# Patient Record
Sex: FEMALE | Race: WHITE | NOT HISPANIC OR LATINO | Employment: FULL TIME | URBAN - METROPOLITAN AREA
[De-identification: names, ages, dates, MRNs, and addresses within clinical notes are randomized per-mention and may not be internally consistent; named-entity substitution may affect disease eponyms.]

---

## 2021-04-29 ENCOUNTER — PATIENT MESSAGE (OUTPATIENT)
Dept: RESEARCH | Facility: HOSPITAL | Age: 26
End: 2021-04-29

## 2021-11-30 ENCOUNTER — OFFICE VISIT (OUTPATIENT)
Dept: NEUROLOGY | Facility: CLINIC | Age: 26
End: 2021-11-30
Payer: COMMERCIAL

## 2021-11-30 VITALS
DIASTOLIC BLOOD PRESSURE: 73 MMHG | HEIGHT: 60 IN | WEIGHT: 111.44 LBS | BODY MASS INDEX: 21.88 KG/M2 | TEMPERATURE: 98 F | HEART RATE: 84 BPM | SYSTOLIC BLOOD PRESSURE: 113 MMHG | RESPIRATION RATE: 17 BRPM

## 2021-11-30 DIAGNOSIS — G44.40 MEDICATION OVERUSE HEADACHE: ICD-10-CM

## 2021-11-30 DIAGNOSIS — F31.9 BIPOLAR 1 DISORDER: ICD-10-CM

## 2021-11-30 DIAGNOSIS — G43.719 INTRACTABLE CHRONIC MIGRAINE WITHOUT AURA AND WITHOUT STATUS MIGRAINOSUS: Primary | ICD-10-CM

## 2021-11-30 DIAGNOSIS — G47.00 INSOMNIA, UNSPECIFIED TYPE: ICD-10-CM

## 2021-11-30 DIAGNOSIS — F41.9 ANXIETY: ICD-10-CM

## 2021-11-30 DIAGNOSIS — F32.A DEPRESSION, UNSPECIFIED DEPRESSION TYPE: ICD-10-CM

## 2021-11-30 DIAGNOSIS — F90.9 ATTENTION DEFICIT HYPERACTIVITY DISORDER (ADHD), UNSPECIFIED ADHD TYPE: ICD-10-CM

## 2021-11-30 PROCEDURE — 99205 OFFICE O/P NEW HI 60 MIN: CPT | Mod: S$GLB,,, | Performed by: PSYCHIATRY & NEUROLOGY

## 2021-11-30 PROCEDURE — 99999 PR PBB SHADOW E&M-EST. PATIENT-LVL IV: CPT | Mod: PBBFAC,,, | Performed by: PSYCHIATRY & NEUROLOGY

## 2021-11-30 PROCEDURE — 99999 PR PBB SHADOW E&M-EST. PATIENT-LVL IV: ICD-10-PCS | Mod: PBBFAC,,, | Performed by: PSYCHIATRY & NEUROLOGY

## 2021-11-30 PROCEDURE — 99205 PR OFFICE/OUTPT VISIT, NEW, LEVL V, 60-74 MIN: ICD-10-PCS | Mod: S$GLB,,, | Performed by: PSYCHIATRY & NEUROLOGY

## 2021-11-30 RX ORDER — ONDANSETRON 4 MG/1
4 TABLET, FILM COATED ORAL EVERY 8 HOURS PRN
Qty: 15 TABLET | Refills: 3 | Status: SHIPPED | OUTPATIENT
Start: 2021-11-30 | End: 2021-11-30 | Stop reason: SDUPTHER

## 2021-11-30 RX ORDER — SUMATRIPTAN 20 MG/1
1 SPRAY NASAL
Qty: 6 EACH | Refills: 3 | Status: SHIPPED | OUTPATIENT
Start: 2021-11-30 | End: 2021-11-30 | Stop reason: SDUPTHER

## 2021-11-30 RX ORDER — SUMATRIPTAN 20 MG/1
1 SPRAY NASAL
Qty: 6 EACH | Refills: 3 | Status: SHIPPED | OUTPATIENT
Start: 2021-11-30 | End: 2023-01-17

## 2021-11-30 RX ORDER — CEFUROXIME AXETIL 250 MG/1
6 TABLET ORAL
Qty: 3 ML | Refills: 3 | Status: SHIPPED | OUTPATIENT
Start: 2021-11-30 | End: 2021-11-30 | Stop reason: SDUPTHER

## 2021-11-30 RX ORDER — CEFUROXIME AXETIL 250 MG/1
6 TABLET ORAL
Qty: 3 ML | Refills: 3 | Status: SHIPPED | OUTPATIENT
Start: 2021-11-30 | End: 2022-02-10

## 2021-11-30 RX ORDER — ONDANSETRON 4 MG/1
4 TABLET, FILM COATED ORAL EVERY 8 HOURS PRN
Qty: 15 TABLET | Refills: 3 | Status: SHIPPED | OUTPATIENT
Start: 2021-11-30 | End: 2022-02-10 | Stop reason: SDUPTHER

## 2021-12-27 ENCOUNTER — PROCEDURE VISIT (OUTPATIENT)
Dept: NEUROLOGY | Facility: CLINIC | Age: 26
End: 2021-12-27
Payer: COMMERCIAL

## 2021-12-27 VITALS
SYSTOLIC BLOOD PRESSURE: 116 MMHG | RESPIRATION RATE: 18 BRPM | DIASTOLIC BLOOD PRESSURE: 74 MMHG | TEMPERATURE: 98 F | WEIGHT: 112.44 LBS | HEIGHT: 60 IN | BODY MASS INDEX: 22.07 KG/M2 | HEART RATE: 93 BPM

## 2021-12-27 DIAGNOSIS — G43.719 INTRACTABLE CHRONIC MIGRAINE WITHOUT AURA AND WITHOUT STATUS MIGRAINOSUS: Primary | ICD-10-CM

## 2021-12-27 PROCEDURE — 64615 CHEMODENERV MUSC MIGRAINE: CPT | Mod: S$GLB,,, | Performed by: PSYCHIATRY & NEUROLOGY

## 2021-12-27 PROCEDURE — 64615 PR CHEMODENERVATION OF MUSCLE FOR CHRONIC MIGRAINE: ICD-10-PCS | Mod: S$GLB,,, | Performed by: PSYCHIATRY & NEUROLOGY

## 2022-02-10 ENCOUNTER — OFFICE VISIT (OUTPATIENT)
Dept: NEUROLOGY | Facility: CLINIC | Age: 27
End: 2022-02-10
Payer: COMMERCIAL

## 2022-02-10 DIAGNOSIS — G43.719 INTRACTABLE CHRONIC MIGRAINE WITHOUT AURA AND WITHOUT STATUS MIGRAINOSUS: Primary | ICD-10-CM

## 2022-02-10 PROCEDURE — 1159F PR MEDICATION LIST DOCUMENTED IN MEDICAL RECORD: ICD-10-PCS | Mod: CPTII,95,, | Performed by: PSYCHIATRY & NEUROLOGY

## 2022-02-10 PROCEDURE — 1159F MED LIST DOCD IN RCRD: CPT | Mod: CPTII,95,, | Performed by: PSYCHIATRY & NEUROLOGY

## 2022-02-10 PROCEDURE — 1160F RVW MEDS BY RX/DR IN RCRD: CPT | Mod: CPTII,95,, | Performed by: PSYCHIATRY & NEUROLOGY

## 2022-02-10 PROCEDURE — 99214 OFFICE O/P EST MOD 30 MIN: CPT | Mod: 95,,, | Performed by: PSYCHIATRY & NEUROLOGY

## 2022-02-10 PROCEDURE — 1160F PR REVIEW ALL MEDS BY PRESCRIBER/CLIN PHARMACIST DOCUMENTED: ICD-10-PCS | Mod: CPTII,95,, | Performed by: PSYCHIATRY & NEUROLOGY

## 2022-02-10 PROCEDURE — 99214 PR OFFICE/OUTPT VISIT, EST, LEVL IV, 30-39 MIN: ICD-10-PCS | Mod: 95,,, | Performed by: PSYCHIATRY & NEUROLOGY

## 2022-02-10 RX ORDER — RIMEGEPANT SULFATE 75 MG/75MG
75 TABLET, ORALLY DISINTEGRATING ORAL ONCE AS NEEDED
Qty: 16 TABLET | Refills: 6 | Status: SHIPPED | OUTPATIENT
Start: 2022-02-10 | End: 2022-02-15

## 2022-02-10 RX ORDER — ELETRIPTAN HYDROBROMIDE 40 MG/1
40 TABLET, FILM COATED ORAL
Qty: 9 TABLET | Refills: 6 | Status: SHIPPED | OUTPATIENT
Start: 2022-02-10 | End: 2023-01-17

## 2022-02-10 RX ORDER — ONDANSETRON 4 MG/1
4 TABLET, FILM COATED ORAL EVERY 8 HOURS PRN
Qty: 15 TABLET | Refills: 6 | Status: SHIPPED | OUTPATIENT
Start: 2022-02-10 | End: 2023-07-20

## 2022-02-10 RX ORDER — SUMATRIPTAN SUCCINATE 4 MG/.5ML
4 INJECTION, SOLUTION SUBCUTANEOUS
Qty: 3 ML | Refills: 6 | Status: SHIPPED | OUTPATIENT
Start: 2022-02-10 | End: 2023-01-17

## 2022-02-10 NOTE — PROGRESS NOTES
[facility]    The patient location is: [place]  The chief complaint leading to consultation is: chronic migraine    Visit type: tele audiovisual  Face to Face time with patient: 13 minutes  30 minutes of total time spent on the encounter, which includes face to face time and non-face to face time preparing to see the patient (eg, review of tests), Obtaining and/or reviewing separately obtained history, Documenting clinical information in the electronic or other health record, Independently interpreting results (not separately reported) and communicating results to the patient/family/caregiver, or Care coordination (not separately reported).         Each patient to whom he or she provides medical services by telemedicine is:  (1) informed of the relationship between the physician and patient and the respective role of any other health care provider with respect to management of the patient; and (2) notified that he or she may decline to receive medical services by telemedicine and may withdraw from such care at any time.    Notes:     Chief complaint: chronic migraine     S:    26 Y ambidextrous F with PMHx of ADHD, iron deficiency anemia, bipolar 1 disorder, OCD, anxiety, depression, insomnia who presents for further  evaluation of headache. She is being seen through telemedicine. She was initially seen on 11/30/21at which time she was having 20 days a month of migraine and had not improved with oral preventives. She was having significant severe attacks she would wake up with them and lasting up to 4 days per attack. She was started on Botox for chronci migraine and prescribed sumatriptan injectable and NS.        Today she reports since Botox 155 units on 12/21 she has noticed improvement since her first cycle of Botox. She has only had about 5-6 migraine attacks since Botox, they are  lasting 2 days now. She feels that attacks are much less frequent and also not as long down to 2  "days. Overall she feels that things have improved significantly. She feels that sumatriptan 6mg sc is working well. It gives quick relief, but causes significant sudden fatigue "knocks me down", but  it's not fully resolving the attack . She will have attack for 2 days, but much less intense. She mentions that she would like a lower dose of the injectable . She does not like the NS and would like to still keep it in case of needing it, but not her preferred and would not like refills on it. She is open to an oral triptan or other options. She mentions that at times she goes to bed with a headache and then will wake up in the morning with severe attack. She tried doing injectable at that time and felt it was like wasting an injection because she would wake up with migraine still. She mentions that she woke up yesterday with nausea/vomiting and had to use injection. It limits what she can do after this.     Headache history from initial evaluation on 11/30/21:  Age at onset and course over time: she had them since elementary school, she reports that she has been having over the years more days of headache since childhood. She mentions that since getting dark glasses in 2017 they improved some. She mentions that the only time she did not have migraine was during the pregnancy. She mentions that she even had Daith Piercing which did not work. She mentions that she could before push through day #4 of migraine, but she is mentions that she is having more disabling attacks.    Location: entire head   Character: stabbing, pressure, throbbing/pounding, sharp   Intensity:  3-10/10  Has about 10 days of no headache   Frequency: 20/30 days   She gets a sick feeling like she knows an attack will hit her. She mentions that if she does not wake up with one in the morning, by 2 pm it just hit her and just starts at once and all the symptoms start at once. She mentions that when she leaves work and has things to do and she will have " worsening when she is doing things. She mentions that she has not been to work in over 2 weeks now . She mentions that she would leave at lunch time to go home and has not been back.   Duration: minimum 1 week of migraine , when it's severe she feels unsafe.   She is waking up with them, but if does not wake up with them by 2 pm she will have sudden onset of the attack.   She will use APAP/NSAID and will use   Aura: none   Associated symptoms: photophobia, phonophobia, osmophobia, kinesiophobia, neck tightness/pain, nausea/vomiting  Other neurologic symptoms: dizziness, vertigo, ringing in the ears, mood changes, problems with concentration, memory, task completion, relaxation, neck tightness, neck pain.   Precipitating factors: light, noise  Alleviating factors: darkness, heat, ice, massage, local pressure  Aggravating factors: bright lights, loud noises  High pitched steady ringing in right ear  Family history of headache: paternal cousins, paternal grandmother has headache all the time  ER/UC visits:   Caffeine: 4 cups of coffee  Sleep: trouble falling asleep, trouble staying asleep, un refreshing sleep     Medication history:  Acute:  apap  nsaid- ibuprofen, asa, naproxen  fioricet  excedrin  Hydrocodone   Sumatriptan 6mg sc - works well at pain relief  Sumatriptan 20mg sc - does not like this    Preventive:  Lamotrigine 200 mg for mood stabilizer.   seroquel 300 mg nightly   pregabalin  topiramate   Desipramine   Fluoxetine   Bupropion   Citalopram  escitalopram  Gabapentin   Diazepam  Alprazolam   Lorazepam   clonazepam  Botox 155 units started on 12/21- present: over 50% improvement     Has never had CGRP Mab   Has never been on triptans, ergotamines, gepants         Neuroimaging and other studies:   She has had MRIs int he past and has been told normal.   She has metal in her tongue that is stuck and is trying to find someone to surgically remove it.     ROS: The fourteen point review of system was  performed.   Constitutional:  Denies fevers/cold or heat intolerance, weight loss/gain or fatigue.  Eyes:                        Denies diplopia, ptosis, visual field defects or ocular disease   excepting any listed above.  ENT:   Denies hearing loss, infection, carcinoma or other diseases excepting any listed above.   Cardiovascular:  Denies stroke, CAD, arrhythmia, CHF or other disease excepting any listed above.  Pulmonary:  Denies dyspnea, COPD, RAD or infection or neoplasm excepting any listed above.  Gastrointestinal: Denies ulcer disease, liver or other disease excepting any listed above.  Skin:   Denies rash, skin cancer, or other cutaneous disorder excepting any listed above.  Neurological:  See HPI  Musculoskeletal: Rt hand pain, weakness   Heme/Lymphatic: Denies any blood or lymph system neoplasm or disorder excepting any listed above.  Endocrine:  Denies any thyroid or other disorders, excepting any listed above.  Allergic/Immuno: Denies any autoimmune disease or allergy excepting any listed above.  Psychiatric:  Mood improved some   Urologic:  Denies any difficulties with the urinary system or sexual function except noted above.    No changes to past medical history, surgical or family history since last appointment.   Social history:  Occupation: construction, manual labor, operating equipment.   Currently on CDL      7.5y son  No plans of further pregnancies.   Social History     Tobacco Use    Smoking status: Current Every Day Smoker     Packs/day: 0.25    Smokeless tobacco: Never Used   Substance Use Topics    Alcohol use: Yes     Comment: 1-2 drinks lper week    Drug use: Not Currently     Types: Amphetamines, Barbituates, Benzodiazepines, Cocaine, Heroin, Marijuana     Comment: narcotics, ecstacy (all prior use)     Review of patient's allergies indicates:   Allergen Reactions    Bactrim [sulfamethoxazole-trimethoprim]     Sulfa (sulfonamide antibiotics)        Current Outpatient  Medications:     dextroamphetamine-amphetamine (ADDERALL) 30 mg Tab, Take 1 tablet (30 mg total) by mouth 2 (two) times daily., Disp: 60 tablet, Rfl: 0    lamoTRIgine (LAMICTAL) 200 MG tablet, 200 mg nightly. , Disp: , Rfl:     norgestimate-ethinyl estradioL (ORTHO-CYCLEN) 0.25-35 mg-mcg per tablet, Take 1 tablet by mouth once daily., Disp: 30 tablet, Rfl: 11    ondansetron (ZOFRAN) 4 MG tablet, Take 1 tablet (4 mg total) by mouth every 8 (eight) hours as needed for Nausea., Disp: 15 tablet, Rfl: 3    QUEtiapine (SEROQUEL) 300 MG Tab, MAY CAUSE DROWSINESS TAKE 1 TABLET BY MOUTH EVERY NIGHT, Disp: , Rfl:     sumatriptan (IMITREX STATDOSE) 6 mg/0.5 mL kit, Inject 0.5 mLs (6 mg total) into the skin as needed for Migraine (can repeat after 2 hours. max 2/day.)., Disp: 3 mL, Rfl: 3    SUMAtriptan (IMITREX) 20 mg/actuation nasal spray, 1 spray (20 mg total) by Nasal route as needed for Migraine (can repeat after 2 hours. max 2/day.)., Disp: 6 each, Rfl: 3    PHYSICAL EXAMINATION  There were no vitals filed for this visit.   General: The patient is a well-developed, well-nourished looking of stated age in no acute distress.  NEUROLOGIC EXAM:  MENTAL: The patient is awake, alert and oriented times to time, place, location and situation. Intact recent memory, attention, concentration. Language and speech are normal. No aphasia, no dysarthria  CRANIAL NERVES:   EOMI, no facial asymmetry noted.   MOTOR EXAM she is currently moving and bending down during visit, she has full UE/LE strength from her activities.   CEREBELLAR EXAM: no UE dysmetria   GAIT/STANCE: Standard gait was normal with normal stride, arm swing and turning.  No gait     Impression:  Chronic migraine without aura - she has had lifelong migraine disease which has been chronic for many years without much improvement on oral preventives. She has been overusing OTC medications to get through the day and manage. She has been on OCPs for 7.5 years now without  any worsening headache. Initially she was having 20/30 days of severe migraine with significant disability as per MIDAS score and history. Since last appointment she started on Botox for chornic migraine and has had in the last 6 weeks 5-6 migraine attacks, they are lasting less to 2 days and feels sumatriptan is working, but does not like the sumatriptan effect. We will transition her to 4mg sc. Also will provide Eletriptan 40mg for acute management of milder attacks and Nurtec to see if adding that will also provide benefit without return of migraine.     Medication overuse hadache - resolved.     Comorbidities:  ADHD -> Adderall    iron deficiency anemia    bipolar 1 disorder - sees psychiatry currently on Lamotrigine 200mg and Seroquel 300mg    OCD, anxiety, depression, insomnia -> behavioral health     Plan:   1- For preventive management: a. Continue Botox q 12weeks  Muscles to be injected:  total of 155 units of botulinum toxin type A were  injected in the following muscles: Procerus 5 units,  5 units bilaterally, frontalis 20 units, temporalis 20 units bilaterally, occipitalis 15 units, upper cervical paraspinals 10 units bilaterally and trapezius 15 units bilaterally. Total of 155 units in 31 sites.    2- Acute management:   For mild attacks eletriptan 40 mg at onset, can repeat after 2 hours. Max 2/day.   For sudden onset migraine, severe attack, nausea/vomiting: Sumatriptan 4 mg sc, can repeat after 2 hours. Max 3/day.     Do not take sumatriptan injectable and eletriptan the same day.      With or without Nurtec ODT 75 mg . Max 1/day. Do not take with grapefruit.     Do not take acute medications over 10 days a month or so.     For nausea: Zofran ODT 4 mg every 8 hours as needed     RTC in 6 weeks for Botox.         Marley Bernstein MD   Board Certified Neurologist  [facility] Certified Headache Medicine

## 2022-02-14 ENCOUNTER — TELEPHONE (OUTPATIENT)
Dept: PHARMACY | Facility: CLINIC | Age: 27
End: 2022-02-14
Payer: COMMERCIAL

## 2022-03-28 ENCOUNTER — PROCEDURE VISIT (OUTPATIENT)
Dept: NEUROLOGY | Facility: CLINIC | Age: 27
End: 2022-03-28
Payer: COMMERCIAL

## 2022-03-28 VITALS
RESPIRATION RATE: 17 BRPM | SYSTOLIC BLOOD PRESSURE: 109 MMHG | TEMPERATURE: 98 F | DIASTOLIC BLOOD PRESSURE: 70 MMHG | HEART RATE: 93 BPM | WEIGHT: 111 LBS | HEIGHT: 60 IN | BODY MASS INDEX: 21.79 KG/M2

## 2022-03-28 DIAGNOSIS — G43.719 INTRACTABLE CHRONIC MIGRAINE WITHOUT AURA AND WITHOUT STATUS MIGRAINOSUS: Primary | ICD-10-CM

## 2022-03-28 PROCEDURE — 64615 CHEMODENERV MUSC MIGRAINE: CPT | Mod: S$GLB,,, | Performed by: PSYCHIATRY & NEUROLOGY

## 2022-03-28 PROCEDURE — 64615 PR CHEMODENERVATION OF MUSCLE FOR CHRONIC MIGRAINE: ICD-10-PCS | Mod: S$GLB,,, | Performed by: PSYCHIATRY & NEUROLOGY

## 2022-03-28 NOTE — PROCEDURES
Procedures     BOTOX PROCEDURE    PROCEDURE PERFORMED: Botulinum toxin injection (98318)    CLINICAL INDICATION: G43.719    A time out was conducted just before the start of the procedure to verify the correct patient and procedure, procedure location, and all relevant critical information.   Signed consent obtained prior to procedure     Conventional methods of treatment but the patient has been unresponsive and refractory.The patient meets criteria for chronic headaches according to the ICHD-III, the patient has more than 15 headaches a month at least 8 of them meet migraine criteria, which last for more than 4 hours a day.     Last Botox injections were on 12/27/21  and  there has been over 50%  improvement in the patient's symptoms. Frequency of treatment is every 12 weeks unless no response to the treatments, at which time we will discontinue the injections.     DESCRIPTION OF PROCEDURE: After obtaining informed consent and under  aseptic technique, a total of 155 units of botulinum toxin type A were   injected in the following muscles: Procerus 5 units,  5 units  bilaterally, frontalis 20 units, temporalis 20 units bilaterally,  occipitalis 15 units, upper cervical paraspinals 10 units bilaterally and trapezius 15 units bilaterally. The patient was given a total of 155 units in 31 sites.    The patient tolerated the procedure well. There were no complications. The patient was given a prescription for repeat treatment in 12 weeks.     Unavoidable waste 45 units    Marley Bernstein MD   Board Certified Neurologist   [facility] Certified Headache Medicine

## 2022-06-20 ENCOUNTER — PROCEDURE VISIT (OUTPATIENT)
Dept: NEUROLOGY | Facility: CLINIC | Age: 27
End: 2022-06-20
Payer: COMMERCIAL

## 2022-06-20 ENCOUNTER — PATIENT MESSAGE (OUTPATIENT)
Dept: NEUROLOGY | Facility: CLINIC | Age: 27
End: 2022-06-20

## 2022-06-20 VITALS
BODY MASS INDEX: 21.47 KG/M2 | DIASTOLIC BLOOD PRESSURE: 80 MMHG | WEIGHT: 109.38 LBS | HEART RATE: 101 BPM | HEIGHT: 60 IN | SYSTOLIC BLOOD PRESSURE: 122 MMHG | RESPIRATION RATE: 17 BRPM

## 2022-06-20 DIAGNOSIS — G43.719 INTRACTABLE CHRONIC MIGRAINE WITHOUT AURA AND WITHOUT STATUS MIGRAINOSUS: Primary | ICD-10-CM

## 2022-06-20 PROCEDURE — 64615 CHEMODENERV MUSC MIGRAINE: CPT | Mod: S$GLB,,, | Performed by: PSYCHIATRY & NEUROLOGY

## 2022-06-20 PROCEDURE — 64615 PR CHEMODENERVATION OF MUSCLE FOR CHRONIC MIGRAINE: ICD-10-PCS | Mod: S$GLB,,, | Performed by: PSYCHIATRY & NEUROLOGY

## 2022-06-20 RX ORDER — RIMEGEPANT SULFATE 75 MG/75MG
75 TABLET, ORALLY DISINTEGRATING ORAL ONCE AS NEEDED
Qty: 16 TABLET | Refills: 11 | Status: SHIPPED | OUTPATIENT
Start: 2022-06-20 | End: 2022-07-08

## 2022-06-20 NOTE — PROCEDURES
Procedures       BOTOX PROCEDURE    PROCEDURE PERFORMED: Botulinum toxin injection (41599)    CLINICAL INDICATION: G43.719    Cycle #3    A time out was conducted just before the start of the procedure to verify the correct patient and procedure, procedure location, and all relevant critical information.   Signed consent obtained prior to procedure     Conventional methods of treatment but the patient has been unresponsive and refractory.The patient meets criteria for chronic headaches according to the ICHD-III, the patient has more than 15 headaches a month at least 8 of them meet migraine criteria, which last for more than 4 hours a day.     Last Botox injections were on 3/28/22  and  there has been over 50%  improvement in the patient's symptoms. Frequency of treatment is every 12 weeks unless no response to the treatments, at which time we will discontinue the injections.     DESCRIPTION OF PROCEDURE: After obtaining informed consent and under  aseptic technique, a total of 155 units of botulinum toxin type A were   injected in the following muscles: Procerus 5 units,  5 units  bilaterally, frontalis 20 units, temporalis 20 units bilaterally,  occipitalis 15 units, upper cervical paraspinals 10 units bilaterally and trapezius 15 units bilaterally. The patient was given a total of 155 units in 31 sites.    The patient tolerated the procedure well. There were no complications. The patient was given a prescription for repeat treatment in 12 weeks.     Unavoidable waste 45 units    RTC in 6 weeks.     Marley Bernstein MD   Board Certified Neurologist   [facility] Certified Headache Medicine

## 2022-07-26 ENCOUNTER — TELEPHONE (OUTPATIENT)
Dept: PSYCHIATRY | Facility: CLINIC | Age: 27
End: 2022-07-26
Payer: COMMERCIAL

## 2022-08-01 ENCOUNTER — OFFICE VISIT (OUTPATIENT)
Dept: PRIMARY CARE CLINIC | Facility: CLINIC | Age: 27
End: 2022-08-01
Payer: COMMERCIAL

## 2022-08-01 ENCOUNTER — OFFICE VISIT (OUTPATIENT)
Dept: NEUROLOGY | Facility: CLINIC | Age: 27
End: 2022-08-01
Payer: COMMERCIAL

## 2022-08-01 ENCOUNTER — TELEPHONE (OUTPATIENT)
Dept: OBSTETRICS AND GYNECOLOGY | Facility: CLINIC | Age: 27
End: 2022-08-01
Payer: COMMERCIAL

## 2022-08-01 VITALS
TEMPERATURE: 98 F | BODY MASS INDEX: 21.77 KG/M2 | SYSTOLIC BLOOD PRESSURE: 110 MMHG | WEIGHT: 110.88 LBS | HEART RATE: 92 BPM | DIASTOLIC BLOOD PRESSURE: 72 MMHG | HEIGHT: 60 IN

## 2022-08-01 DIAGNOSIS — G43.019 INTRACTABLE MIGRAINE WITHOUT AURA AND WITHOUT STATUS MIGRAINOSUS: ICD-10-CM

## 2022-08-01 DIAGNOSIS — F90.9 ATTENTION DEFICIT HYPERACTIVITY DISORDER (ADHD), UNSPECIFIED ADHD TYPE: ICD-10-CM

## 2022-08-01 DIAGNOSIS — Z86.69 H/O MIGRAINE: ICD-10-CM

## 2022-08-01 DIAGNOSIS — F31.9 BIPOLAR 1 DISORDER: ICD-10-CM

## 2022-08-01 DIAGNOSIS — G43.719 INTRACTABLE CHRONIC MIGRAINE WITHOUT AURA AND WITHOUT STATUS MIGRAINOSUS: Primary | ICD-10-CM

## 2022-08-01 DIAGNOSIS — Z76.89 ENCOUNTER TO ESTABLISH CARE: Primary | ICD-10-CM

## 2022-08-01 DIAGNOSIS — G43.901 STATUS MIGRAINOSUS: ICD-10-CM

## 2022-08-01 PROCEDURE — 1159F MED LIST DOCD IN RCRD: CPT | Mod: CPTII,S$GLB,, | Performed by: FAMILY MEDICINE

## 2022-08-01 PROCEDURE — 99999 PR PBB SHADOW E&M-EST. PATIENT-LVL IV: CPT | Mod: PBBFAC,,, | Performed by: FAMILY MEDICINE

## 2022-08-01 PROCEDURE — 99214 OFFICE O/P EST MOD 30 MIN: CPT | Mod: 95,,, | Performed by: PSYCHIATRY & NEUROLOGY

## 2022-08-01 PROCEDURE — 1160F PR REVIEW ALL MEDS BY PRESCRIBER/CLIN PHARMACIST DOCUMENTED: ICD-10-PCS | Mod: CPTII,S$GLB,, | Performed by: FAMILY MEDICINE

## 2022-08-01 PROCEDURE — 99999 PR PBB SHADOW E&M-EST. PATIENT-LVL IV: ICD-10-PCS | Mod: PBBFAC,,, | Performed by: FAMILY MEDICINE

## 2022-08-01 PROCEDURE — 99215 PR OFFICE/OUTPT VISIT, EST, LEVL V, 40-54 MIN: ICD-10-PCS | Mod: S$GLB,,, | Performed by: FAMILY MEDICINE

## 2022-08-01 PROCEDURE — 99214 PR OFFICE/OUTPT VISIT, EST, LEVL IV, 30-39 MIN: ICD-10-PCS | Mod: 95,,, | Performed by: PSYCHIATRY & NEUROLOGY

## 2022-08-01 PROCEDURE — 1160F RVW MEDS BY RX/DR IN RCRD: CPT | Mod: CPTII,95,, | Performed by: PSYCHIATRY & NEUROLOGY

## 2022-08-01 PROCEDURE — 1159F MED LIST DOCD IN RCRD: CPT | Mod: CPTII,95,, | Performed by: PSYCHIATRY & NEUROLOGY

## 2022-08-01 PROCEDURE — 3074F SYST BP LT 130 MM HG: CPT | Mod: CPTII,S$GLB,, | Performed by: FAMILY MEDICINE

## 2022-08-01 PROCEDURE — 3074F PR MOST RECENT SYSTOLIC BLOOD PRESSURE < 130 MM HG: ICD-10-PCS | Mod: CPTII,S$GLB,, | Performed by: FAMILY MEDICINE

## 2022-08-01 PROCEDURE — 1160F RVW MEDS BY RX/DR IN RCRD: CPT | Mod: CPTII,S$GLB,, | Performed by: FAMILY MEDICINE

## 2022-08-01 PROCEDURE — 3078F PR MOST RECENT DIASTOLIC BLOOD PRESSURE < 80 MM HG: ICD-10-PCS | Mod: CPTII,S$GLB,, | Performed by: FAMILY MEDICINE

## 2022-08-01 PROCEDURE — 1160F PR REVIEW ALL MEDS BY PRESCRIBER/CLIN PHARMACIST DOCUMENTED: ICD-10-PCS | Mod: CPTII,95,, | Performed by: PSYCHIATRY & NEUROLOGY

## 2022-08-01 PROCEDURE — 1159F PR MEDICATION LIST DOCUMENTED IN MEDICAL RECORD: ICD-10-PCS | Mod: CPTII,95,, | Performed by: PSYCHIATRY & NEUROLOGY

## 2022-08-01 PROCEDURE — 3008F PR BODY MASS INDEX (BMI) DOCUMENTED: ICD-10-PCS | Mod: CPTII,S$GLB,, | Performed by: FAMILY MEDICINE

## 2022-08-01 PROCEDURE — 3078F DIAST BP <80 MM HG: CPT | Mod: CPTII,S$GLB,, | Performed by: FAMILY MEDICINE

## 2022-08-01 PROCEDURE — 1159F PR MEDICATION LIST DOCUMENTED IN MEDICAL RECORD: ICD-10-PCS | Mod: CPTII,S$GLB,, | Performed by: FAMILY MEDICINE

## 2022-08-01 PROCEDURE — 3008F BODY MASS INDEX DOCD: CPT | Mod: CPTII,S$GLB,, | Performed by: FAMILY MEDICINE

## 2022-08-01 PROCEDURE — 99215 OFFICE O/P EST HI 40 MIN: CPT | Mod: S$GLB,,, | Performed by: FAMILY MEDICINE

## 2022-08-01 RX ORDER — DEXTROAMPHETAMINE SACCHARATE, AMPHETAMINE ASPARTATE, DEXTROAMPHETAMINE SULFATE AND AMPHETAMINE SULFATE 7.5; 7.5; 7.5; 7.5 MG/1; MG/1; MG/1; MG/1
30 TABLET ORAL 2 TIMES DAILY
Qty: 60 TABLET | Refills: 0 | Status: SHIPPED | OUTPATIENT
Start: 2022-08-01 | End: 2022-08-31

## 2022-08-01 RX ORDER — RIMEGEPANT SULFATE 75 MG/75MG
75 TABLET, ORALLY DISINTEGRATING ORAL ONCE AS NEEDED
COMMUNITY
End: 2022-08-01 | Stop reason: SDUPTHER

## 2022-08-01 RX ORDER — LAMOTRIGINE 200 MG/1
200 TABLET ORAL NIGHTLY
Qty: 90 TABLET | Refills: 3 | Status: SHIPPED | OUTPATIENT
Start: 2022-08-01 | End: 2022-11-02

## 2022-08-01 RX ORDER — DEXTROAMPHETAMINE SACCHARATE, AMPHETAMINE ASPARTATE, DEXTROAMPHETAMINE SULFATE AND AMPHETAMINE SULFATE 7.5; 7.5; 7.5; 7.5 MG/1; MG/1; MG/1; MG/1
30 TABLET ORAL 2 TIMES DAILY
Qty: 60 TABLET | Refills: 0 | Status: SHIPPED | OUTPATIENT
Start: 2022-09-01 | End: 2022-10-01

## 2022-08-01 RX ORDER — RIMEGEPANT SULFATE 75 MG/75MG
75 TABLET, ORALLY DISINTEGRATING ORAL ONCE AS NEEDED
Qty: 16 TABLET | Refills: 6 | Status: SHIPPED | OUTPATIENT
Start: 2022-08-01 | End: 2022-08-18

## 2022-08-01 RX ORDER — QUETIAPINE FUMARATE 300 MG/1
300 TABLET, FILM COATED ORAL NIGHTLY
Qty: 90 TABLET | Refills: 3 | Status: SHIPPED | OUTPATIENT
Start: 2022-08-01 | End: 2023-06-20 | Stop reason: SDUPTHER

## 2022-08-01 RX ORDER — DEXTROAMPHETAMINE SACCHARATE, AMPHETAMINE ASPARTATE, DEXTROAMPHETAMINE SULFATE AND AMPHETAMINE SULFATE 7.5; 7.5; 7.5; 7.5 MG/1; MG/1; MG/1; MG/1
30 TABLET ORAL 2 TIMES DAILY
Qty: 60 TABLET | Refills: 0 | Status: SHIPPED | OUTPATIENT
Start: 2022-10-02 | End: 2022-11-02 | Stop reason: SDUPTHER

## 2022-08-01 RX ORDER — DIHYDROERGOTAMINE MESYLATE 4 MG/ML
SPRAY, METERED NASAL
Qty: 8 ML | Refills: 6 | Status: SHIPPED | OUTPATIENT
Start: 2022-08-01 | End: 2023-01-17

## 2022-08-01 NOTE — PROGRESS NOTES
"Gurinder Perez MD    [facility]      [address] [place] [address]      Phone: [phone]      Fax: [phone]                  Office Visit  08/01/2022        Subjective      HPI:  Iris Johnson is a 27 y.o. female presents today in clinic for "Establish Care and Medication Refill  ."     27-year-old female presents today to establish care, discuss med refills.    Patient states she feels good today.  She had a good weekend.  Her soul feels happy today.    She denies any chest pain, shortness breast.  Denies fever, chills, body aches.  Denies coughing, sneezing, URI type symptoms.  States appetite is okay.  She tries to drink lots of water each day.  States bowel movements have been more regular lately.  She usually struggles with constipation, very irregular.  Has been going to the bathroom more frequently lately.  Uses essential oils to help with this.  Denies any urinary issues.    States she has a history of ADHD.  Currently takes Adderall 30 mg, twice a day.  Has been taking this for years.  Was previous sleep diagnosed with ADHD shortly after the birth of her 1st child.  Current dose works well for her.  Does not affect sleep.  Helps with focus, concentration.  Without it, she feels scattered brain.    Also has history of bipolar 1 disorder.  Has been taking Lamictal, Seroquel for this.  States she has followed with Psychology/Psychiatry in the past.  Previously was seeing Dr. Tiffanie Galvez, but she changed clinics.  Lately, has been going to [facility], but states that they are not refilling her medications anymore.  Needs new referral to get re-established with Dr. Galvez.  States that over the years, they have tried various medications to help her bipolar/OCD, depression.  This combination seems to work the best.    Also has history of chronic migraines.  Follows with Neurology regularly for this.  Has appointment later this morning.    PMH:  ADHD, bipolar, insomnia, depression, " OCD, panic attacks, migraines.  PSH:  Nose.  Tonsils.  Vale teeth.  F MH:  Skin cancer.  Breast cancer.  CAD.  HTN.  Bipolar.  Alcohol abuse.  Allergies:  Sulfa drugs cause hives  Social:  Works in construction, operate heavy machinery.  T:  Denies  A:  Denies  D:  THC-has medical marijuana card.  Smokes.    Exercise:  No regular exercise program.  Lots of physical activity at work.    Gyn:  Dr. Hassan at [facility] (Wants closer gyn. Has appt on Wed in [facility].)    Pap:  11/22/2021.  Repeat 3 years (2024)      The following were updated and reviewed by myself in the chart: medications, past medical history, past surgical history, family history, social history, and allergies.     Medications:  Current Outpatient Medications on File Prior to Visit   Medication Sig Dispense Refill    eletriptan (RELPAX) 40 MG tablet Take 1 tablet (40 mg total) by mouth as needed (migraine). may repeat in 2 hours if necessary. Max is 2/day. 9 tablet 6    ondansetron (ZOFRAN) 4 MG tablet Take 1 tablet (4 mg total) by mouth every 8 (eight) hours as needed for Nausea. 15 tablet 6    rimegepant (NURTEC) 75 mg odt Take 75 mg by mouth once as needed for Migraine. Place ODT tablet on the tongue; alternatively the ODT tablet may be placed under the tongue      [DISCONTINUED] dextroamphetamine-amphetamine (ADDERALL) 30 mg Tab Take 1 tablet (30 mg total) by mouth 2 (two) times daily. 60 tablet 0    [DISCONTINUED] lamoTRIgine (LAMICTAL) 200 MG tablet 200 mg nightly.       [DISCONTINUED] QUEtiapine (SEROQUEL) 300 MG Tab MAY CAUSE DROWSINESS TAKE 1 TABLET BY MOUTH EVERY NIGHT      SUMAtriptan (IMITREX) 20 mg/actuation nasal spray 1 spray (20 mg total) by Nasal route as needed for Migraine (can repeat after 2 hours. max 2/day.). 6 each 3    SUMAtriptan succinate (IMITREX) 4 mg/0.5 mL PnIj Inject 4 mg into the skin as needed (migraine, can repeat after 2 hours. max 3/day.). 3 mL 6    [DISCONTINUED] norgestimate-ethinyl estradioL (ORTHO-CYCLEN)  0.25-35 mg-mcg per tablet Take 1 tablet by mouth once daily. (Patient not taking: Reported on 8/1/2022) 30 tablet 1     No current facility-administered medications on file prior to visit.        PMHx:  Past Medical History:   Diagnosis Date    ADHD (attention deficit hyperactivity disorder)     Anemia     Bipolar disorder     Chicken pox     childhood    Headache     Insomnia     Migraine     Substance abuse     prior use, no current use    Syncope 2009    admitted x 1      Patient Active Problem List    Diagnosis Date Noted    Intractable chronic migraine without aura and without status migrainosus 12/27/2021        PSHx:  Past Surgical History:   Procedure Laterality Date    dental extraction (2013)  2013    RHINOPLASTY  2010        FHx:  Family History   Problem Relation Age of Onset    Skin cancer Mother     Bipolar disorder Mother     Cervical cancer Mother     Hyperlipidemia Father     Hyperlipidemia Paternal Grandmother     Breast cancer Paternal Grandmother     Asthma Paternal Grandmother     Depression Paternal Grandmother     Hyperlipidemia Paternal Grandfather     Heart disease Paternal Grandfather         Social:  Social History     Socioeconomic History    Marital status:     Number of children: 1   Occupational History    Occupation:      Comment: [employer]   Tobacco Use    Smoking status: Current Every Day Smoker     Packs/day: 0.25    Smokeless tobacco: Never Used   Substance and Sexual Activity    Alcohol use: Yes     Comment: 1-2 drinks lper week    Drug use: Not Currently     Types: Amphetamines, Barbituates, Benzodiazepines, Cocaine, Heroin, Marijuana     Comment: narcotics, ecstacy (all prior use)    Sexual activity: Yes     Partners: Male     Comment: birth control pill        Allergies:  Review of patient's allergies indicates:   Allergen Reactions    Bactrim [sulfamethoxazole-trimethoprim]     Sulfa (sulfonamide antibiotics)         ROS:  Review  of Systems   Constitutional: Negative for activity change, appetite change, chills and fever.   HENT: Negative for congestion, postnasal drip, rhinorrhea, sore throat and trouble swallowing.    Respiratory: Negative for cough, shortness of breath and wheezing.    Cardiovascular: Negative for chest pain and palpitations.   Gastrointestinal: Negative for abdominal pain, constipation, diarrhea, nausea and vomiting.   Genitourinary: Negative for difficulty urinating.   Musculoskeletal: Negative for arthralgias and myalgias.   Skin: Negative for color change and rash.   Neurological: Negative for speech difficulty and headaches.   All other systems reviewed and are negative.         Objective      /72   Pulse 92   Temp 98.2 °F (36.8 °C)   Ht 5' (1.524 m)   Wt 50.3 kg (110 lb 14.3 oz)   BMI 21.66 kg/m²   Ht Readings from Last 3 Encounters:   08/01/22 5' (1.524 m)   06/20/22 5' (1.524 m)   03/28/22 5' (1.524 m)     Wt Readings from Last 3 Encounters:   08/01/22 50.3 kg (110 lb 14.3 oz)   06/20/22 49.6 kg (109 lb 5.6 oz)   03/28/22 50.4 kg (111 lb 0.4 oz)       PHYSICAL EXAM:  Physical Exam  Vitals and nursing note reviewed.   Constitutional:       General: She is not in acute distress.     Appearance: Normal appearance.   HENT:      Head: Normocephalic and atraumatic.      Right Ear: Tympanic membrane, ear canal and external ear normal.      Left Ear: Tympanic membrane, ear canal and external ear normal.      Nose: Nose normal. No congestion or rhinorrhea.      Mouth/Throat:      Mouth: Mucous membranes are moist.      Pharynx: Oropharynx is clear. No oropharyngeal exudate or posterior oropharyngeal erythema.   Eyes:      Extraocular Movements: Extraocular movements intact.      Conjunctiva/sclera: Conjunctivae normal.      Pupils: Pupils are equal, round, and reactive to light.   Cardiovascular:      Rate and Rhythm: Normal rate and regular rhythm.   Pulmonary:      Effort: Pulmonary effort is normal. No  respiratory distress.      Breath sounds: No wheezing, rhonchi or rales.   Musculoskeletal:         General: Normal range of motion.      Cervical back: Normal range of motion.   Lymphadenopathy:      Cervical: No cervical adenopathy.   Skin:     General: Skin is warm and dry.      Findings: No rash.   Neurological:      Mental Status: She is alert.              LABS / IMAGING:  No results found for this or any previous visit (from the past 4368 hour(s)).      Assessment    1. Encounter to establish care    2. Attention deficit hyperactivity disorder (ADHD), unspecified ADHD type    3. Bipolar 1 disorder    4. H/O migraine          Plan    Iris was seen today for establish care and medication refill.    Diagnoses and all orders for this visit:    Encounter to establish care    Attention deficit hyperactivity disorder (ADHD), unspecified ADHD type  -     dextroamphetamine-amphetamine 30 mg Tab; Take 1 tablet (30 mg total) by mouth 2 (two) times daily.  -     dextroamphetamine-amphetamine 30 mg Tab; Take 1 tablet (30 mg total) by mouth 2 (two) times daily.  -     dextroamphetamine-amphetamine 30 mg Tab; Take 1 tablet (30 mg total) by mouth 2 (two) times daily.    Bipolar 1 disorder  -     lamoTRIgine (LAMICTAL) 200 MG tablet; Take 1 tablet (200 mg total) by mouth nightly.  -     QUEtiapine (SEROQUEL) 300 MG Tab; Take 1 tablet (300 mg total) by mouth every evening.  -     Ambulatory referral/consult to Psychology; Future    H/O migraine      Bipolar:  We will place new referral today for Dr. Galvez, psychology.  Would like her to manage these medications.    ADHD:  Reviewed PDMP, old clinic notes.  Patient has been consistently taking Adderall 30 mg b.i.d. for several years.  Will give 3 month refills today.  Apparently, her pharmacy is not accepting electronic refills at this time?  Will print Rx instead.    History of migraines:  Recommended she keep her appointment with Neurology, as scheduled.      FOLLOW-UP:  Follow  up in about 3 months (around 11/1/2022) for med refill.    I spent a total of 45 minutes face to face and non-face to face on the date of this visit.This includes time preparing to see the patient (eg, review of tests, notes), obtaining and/or reviewing additional history from an independent historian and/or outside medical records, documenting clinical information in the electronic health record, independently interpreting results and/or communicating results to the patient/family/caregiver, or care coordinator.    Signed by:  Gurinder Perez MD

## 2022-08-01 NOTE — TELEPHONE ENCOUNTER
----- Message from Kasi Us NP sent at 8/1/2022 11:33 AM CDT -----  Last annual 11/22/2021.  Too soon for another annual.  Can see her for problem visit if she needs.  Was with SEA Villaseñor

## 2022-08-01 NOTE — PATIENT INSTRUCTIONS
Physically, everything looks good today.    Refills of Adderall printed today.  Unfortunately, it appears that [facility] does not take electronic prescriptions for controlled substances?  When you go there, please ask them about this.    Referral placed for Dr. Tiffanie Galvez, psychology, with [facility] in [place].  They should contact you to schedule an appointment.    Please keep your appointment with the neurologist this afternoon, and with the gynecologist on Wednesday.    Continue to eat a healthy diet.  Be careful with portion sizes.  Includes lots of fresh fruits, vegetables, whole grains, lean proteins.  See info below.    Keep hydrated.  Be sure to drink at least 8-10, 8 oz, glasses of water every day.    Stay active.  Try to do some sort of physical activity every day.  Nothing outrageous, just try walking for 10-15 minutes each day.

## 2022-08-01 NOTE — PROGRESS NOTES
[facility]    The patient location is: [place]  The chief complaint leading to consultation is: chronic migraine    Visit type: tele audiovisual  Face to Face time with patient: 20 minutes    30 minutes of total time spent on the encounter, which includes face to face time and non-face to face time preparing to see the patient (eg, review of tests), Obtaining and/or reviewing separately obtained history, Documenting clinical information in the electronic or other health record, Independently interpreting results (not separately reported) and communicating results to the patient/family/caregiver, or Care coordination (not separately reported).         Each patient to whom he or she provides medical services by telemedicine is:  (1) informed of the relationship between the physician and patient and the respective role of any other health care provider with respect to management of the patient; and (2) notified that he or she may decline to receive medical services by telemedicine and may withdraw from such care at any time.    Notes:     Chief complaint: chronic migraine     S:    27 Y ambidextrous F with PMHx of ADHD, iron deficiency anemia, bipolar 1 disorder, OCD, anxiety, depression, insomnia who presents for further  evaluation of headache.. She has completed 3 cycles of Botox. She mentions that after the Botox cycle 3 on 6/20/22 she had a migraine x 12 days. She treated with nurtec, eletriptan and sumatriptan SC with limited success. nurtec helped some, but eletriptan did not help at all. After those 12 days she is much better. She has had 2 migraine attacks last month and they were 2-4 days long. She feels that her acute medications work in the follow order from best to worse: sumatriptan sc (makes her have worsening pain, dizziness for about 30 minutes so has to lay down, but improves the intensity, is functional after a few hours, but migraine does not necessarily resolve) >  Nurtec (if takes it and goes to bed typically wakes up attack free, but has to take it early >>eletriptan 40mg (has only a very small window to treat very early in process, if waits too long then will not be effect. Overall she is over 50% improved, but still battling the longer migraine attacks. She is open to other options for acute manageent. She does find Botox works well. The first Btx caused 10 day of migraine attack. The second did not. She is not sure if it was because she presented with severe migraine already that the headache occurred for so long. She has been having more neck pain and feels that might be triggering some of the headache as well. Typically will have neck pain along with the headache. She would like to continue Botox for prevention.       Headache history from initial evaluation on 11/30/21:  Age at onset and course over time: she had them since elementary school, she reports that she has been having over the years more days of headache since childhood. She mentions that since getting dark glasses in 2017 they improved some. She mentions that the only time she did not have migraine was during the pregnancy. She mentions that she even had Daith Piercing which did not work. She mentions that she could before push through day #4 of migraine, but she is mentions that she is having more disabling attacks.    Location: entire head   Character: stabbing, pressure, throbbing/pounding, sharp   Intensity:  3-10/10  Has about 10 days of no headache   Frequency: 20/30 days   She gets a sick feeling like she knows an attack will hit her. She mentions that if she does not wake up with one in the morning, by 2 pm it just hit her and just starts at once and all the symptoms start at once. She mentions that when she leaves work and has things to do and she will have worsening when she is doing things. She mentions that she has not been to work in over 2 weeks now . She mentions that she would leave at lunch  time to go home and has not been back.   Duration: minimum 1 week of migraine , when it's severe she feels unsafe.   She is waking up with them, but if does not wake up with them by 2 pm she will have sudden onset of the attack.   She will use APAP/NSAID and will use   Aura: none   Associated symptoms: photophobia, phonophobia, osmophobia, kinesiophobia, neck tightness/pain, nausea/vomiting  Other neurologic symptoms: dizziness, vertigo, ringing in the ears, mood changes, problems with concentration, memory, task completion, relaxation, neck tightness, neck pain.   Precipitating factors: light, noise  Alleviating factors: darkness, heat, ice, massage, local pressure  Aggravating factors: bright lights, loud noises  High pitched steady ringing in right ear  Family history of headache: paternal cousins, paternal grandmother has headache all the time  ER/UC visits:   Caffeine: 4 cups of coffee  Sleep: trouble falling asleep, trouble staying asleep, un refreshing sleep     Medication history:  Acute:  apap  nsaid- ibuprofen, asa, naproxen  fioricet  excedrin  Hydrocodone   Sumatriptan 20mg NS - does not like this  Sumatriptan 6mg sc - works well at pain relief, but then 30 minutes of feeling terrible after it so she holds off on using.   Eletriptan 40mg - can help , but very small window very early in migraine, otherwise did not work.   Nurtec ODT 75mg - helps some earlier in atacks.     Preventive:  Lamotrigine 200 mg for mood stabilizer.   seroquel 300 mg nightly   pregabalin  topiramate   Desipramine   Fluoxetine   Bupropion   Citalopram  escitalopram  Gabapentin   Diazepam  Alprazolam   Lorazepam   clonazepam  Botox 155 units started on 12/21- present: over 50% improvement     Has never had CGRP Mab     Neuroimaging and other studies:   She has had MRIs int he past and has been told normal.   She has metal in her tongue that is stuck and is trying to find someone to surgically remove it.     ROS: The fourteen point  review of system was performed.   Constitutional:  Denies fevers/cold or heat intolerance, weight loss/gain or fatigue.  Eyes:                        Denies diplopia, ptosis, visual field defects or ocular disease   excepting any listed above.  ENT:   Denies hearing loss, infection, carcinoma or other diseases excepting any listed above.   Cardiovascular:  Denies stroke, CAD, arrhythmia, CHF or other disease excepting any listed above.  Pulmonary:  Denies dyspnea, COPD, RAD or infection or neoplasm excepting any listed above.  Gastrointestinal: Denies ulcer disease, liver or other disease excepting any listed above.  Skin:   Denies rash, skin cancer, or other cutaneous disorder excepting any listed above.  Neurological:  See HPI  Musculoskeletal: Rt hand pain, weakness   Heme/Lymphatic: Denies any blood or lymph system neoplasm or disorder excepting any listed above.  Endocrine:  Denies any thyroid or other disorders, excepting any listed above.  Allergic/Immuno: Denies any autoimmune disease or allergy excepting any listed above.  Psychiatric:  Mood improved some   Urologic:  Denies any difficulties with the urinary system or sexual function except noted above.    No changes to past medical history, surgical or family history since last appointment.   Social history:  Occupation: construction, manual labor, operating equipment.   Currently on CDL      7.5y son  No plans of further pregnancies.   Social History     Tobacco Use    Smoking status: Current Every Day Smoker     Packs/day: 0.25    Smokeless tobacco: Never Used   Substance Use Topics    Alcohol use: Yes     Comment: 1-2 drinks lper week    Drug use: Not Currently     Types: Amphetamines, Barbituates, Benzodiazepines, Cocaine, Heroin, Marijuana     Comment: narcotics, ecstacy (all prior use)     Review of patient's allergies indicates:   Allergen Reactions    Bactrim [sulfamethoxazole-trimethoprim]     Sulfa (sulfonamide antibiotics)         Current Outpatient Medications:     dextroamphetamine-amphetamine 30 mg Tab, Take 1 tablet (30 mg total) by mouth 2 (two) times daily., Disp: 60 tablet, Rfl: 0    [START ON 9/1/2022] dextroamphetamine-amphetamine 30 mg Tab, Take 1 tablet (30 mg total) by mouth 2 (two) times daily., Disp: 60 tablet, Rfl: 0    [START ON 10/2/2022] dextroamphetamine-amphetamine 30 mg Tab, Take 1 tablet (30 mg total) by mouth 2 (two) times daily., Disp: 60 tablet, Rfl: 0    eletriptan (RELPAX) 40 MG tablet, Take 1 tablet (40 mg total) by mouth as needed (migraine). may repeat in 2 hours if necessary. Max is 2/day., Disp: 9 tablet, Rfl: 6    lamoTRIgine (LAMICTAL) 200 MG tablet, Take 1 tablet (200 mg total) by mouth nightly., Disp: 90 tablet, Rfl: 3    ondansetron (ZOFRAN) 4 MG tablet, Take 1 tablet (4 mg total) by mouth every 8 (eight) hours as needed for Nausea., Disp: 15 tablet, Rfl: 6    QUEtiapine (SEROQUEL) 300 MG Tab, Take 1 tablet (300 mg total) by mouth every evening., Disp: 90 tablet, Rfl: 3    rimegepant (NURTEC) 75 mg odt, Take 75 mg by mouth once as needed for Migraine. Place ODT tablet on the tongue; alternatively the ODT tablet may be placed under the tongue, Disp: , Rfl:     SUMAtriptan (IMITREX) 20 mg/actuation nasal spray, 1 spray (20 mg total) by Nasal route as needed for Migraine (can repeat after 2 hours. max 2/day.)., Disp: 6 each, Rfl: 3    SUMAtriptan succinate (IMITREX) 4 mg/0.5 mL PnIj, Inject 4 mg into the skin as needed (migraine, can repeat after 2 hours. max 3/day.)., Disp: 3 mL, Rfl: 6    PHYSICAL EXAMINATION  There were no vitals filed for this visit.   General: The patient is a well-developed, well-nourished looking of stated age in no acute distress.  NEUROLOGIC EXAM:  MENTAL: The patient is awake, alert and oriented times to time, place, location and situation. Intact recent memory, attention, concentration. Language and speech are normal. No aphasia, no dysarthria  CRANIAL NERVES:    EOMI, no facial asymmetry noted.   MOTOR EXAM she is currently moving and bending down during visit, she has full UE/LE strength from her activities.   CEREBELLAR EXAM: no UE dysmetria   GAIT/STANCE: Standard gait was normal with normal stride, arm swing and turning.  No gait     Impression:  Chronic migraine without aura - she has had lifelong migraine disease which has been chronic for many years without much improvement on oral preventives. She has been overusing OTC medications to get through the day and manage. She has been on OCPs for 7.5 years now without any worsening headache. Initially she was having 20/30 days of severe migraine with significant disability as per MIDAS score and history. Since last appointment she started on Botox for chornic migraine and has had in the last 6 weeks 5-6 migraine attacks, they are lasting less to 2 days and feels sumatriptan is working, but does not like the sumatriptan effect. We will transition her to 4mg sc. Also will provide Eletriptan 40mg for acute management of milder attacks and Nurtec to see if adding that will also provide benefit without return of migraine.     Medication overuse hadache - resolved.     Comorbidities:  ADHD -> Adderall    iron deficiency anemia    bipolar 1 disorder - sees psychiatry currently on Lamotrigine 200mg and Seroquel 300mg    OCD, anxiety, depression, insomnia -> behavioral health     Plan:   1- For preventive management: a. Continue Botox q 12weeks  Muscles to be injected:  total of 155 units of botulinum toxin type A were  injected in the following muscles: Procerus 5 units,  5 units bilaterally, frontalis 20 units, temporalis 20 units bilaterally, occipitalis 15 units, upper cervical paraspinals 10 units bilaterally and trapezius 15 units bilaterally. Total of 155 units in 31 sites.          B. Toradol 60MG IM for day of Botox          C. If continues  Having at least 4 days of migraine per month consider addition of  CGRP Mab to regimen.   2- Acute management:   For mild attacks catching early  eletriptan 40 mg at onset, can repeat after 2 hours. Max 2/day and/or  Nurtec 75mg . Max 1/day  For sudden onset migraine, severe attack, nausea/vomiting:      A. Sumatriptan 6 mg sc, can repeat after 2 hours. Max 3/day.   OR   B. Trudhesa 1 spray per nostril, can repeat after 1 hour. Max 4 sprays per day     Patient will trial trudhesa and see if it is more effective than sumatriptan SC as this does not always resolve attacks    Can not take sumatriptan injection and/or eletriptan with Trudhesa in same 24 hour period. Do not take these over 10 days a month. .    You can mix with Nurtec ODT 75mg with trudhesa or the triptans.     For nausea: Zofran ODT 4 mg every 8 hours as needed     3- Mini prophylaxis  For migraine around Nurtec day prior to Botox x 3-5 days.     RTC in 6 weeks for Botox.         Marley Bernstein MD   Board Certified Neurologist  [facility] Headache Medicine

## 2022-08-02 ENCOUNTER — TELEPHONE (OUTPATIENT)
Dept: NEUROLOGY | Facility: CLINIC | Age: 27
End: 2022-08-02
Payer: COMMERCIAL

## 2022-08-03 ENCOUNTER — OFFICE VISIT (OUTPATIENT)
Dept: OBSTETRICS AND GYNECOLOGY | Facility: CLINIC | Age: 27
End: 2022-08-03
Payer: COMMERCIAL

## 2022-08-03 VITALS
DIASTOLIC BLOOD PRESSURE: 68 MMHG | BODY MASS INDEX: 21.36 KG/M2 | SYSTOLIC BLOOD PRESSURE: 108 MMHG | WEIGHT: 109.38 LBS

## 2022-08-03 DIAGNOSIS — Z30.011 OCP (ORAL CONTRACEPTIVE PILLS) INITIATION: Primary | ICD-10-CM

## 2022-08-03 PROBLEM — G47.00 INSOMNIA: Status: ACTIVE | Noted: 2022-08-03

## 2022-08-03 PROBLEM — G56.00 CTS (CARPAL TUNNEL SYNDROME): Status: ACTIVE | Noted: 2021-12-14

## 2022-08-03 PROBLEM — F90.0 ATTENTION DEFICIT HYPERACTIVITY DISORDER (ADHD), PREDOMINANTLY INATTENTIVE TYPE: Status: ACTIVE | Noted: 2022-08-03

## 2022-08-03 PROBLEM — F41.9 ANXIETY: Status: ACTIVE | Noted: 2022-08-03

## 2022-08-03 PROBLEM — G43.909 MIGRAINE: Status: ACTIVE | Noted: 2022-08-03

## 2022-08-03 PROBLEM — F31.9 BIPOLAR DISORDER: Status: ACTIVE | Noted: 2022-08-03

## 2022-08-03 PROCEDURE — 81025 URINE PREGNANCY TEST: CPT | Mod: S$GLB,,, | Performed by: NURSE PRACTITIONER

## 2022-08-03 PROCEDURE — 3008F PR BODY MASS INDEX (BMI) DOCUMENTED: ICD-10-PCS | Mod: CPTII,S$GLB,, | Performed by: NURSE PRACTITIONER

## 2022-08-03 PROCEDURE — 3008F BODY MASS INDEX DOCD: CPT | Mod: CPTII,S$GLB,, | Performed by: NURSE PRACTITIONER

## 2022-08-03 PROCEDURE — 3074F SYST BP LT 130 MM HG: CPT | Mod: CPTII,S$GLB,, | Performed by: NURSE PRACTITIONER

## 2022-08-03 PROCEDURE — 1160F RVW MEDS BY RX/DR IN RCRD: CPT | Mod: CPTII,S$GLB,, | Performed by: NURSE PRACTITIONER

## 2022-08-03 PROCEDURE — 3078F DIAST BP <80 MM HG: CPT | Mod: CPTII,S$GLB,, | Performed by: NURSE PRACTITIONER

## 2022-08-03 PROCEDURE — 99203 PR OFFICE/OUTPT VISIT, NEW, LEVL III, 30-44 MIN: ICD-10-PCS | Mod: S$GLB,,, | Performed by: NURSE PRACTITIONER

## 2022-08-03 PROCEDURE — 99203 OFFICE O/P NEW LOW 30 MIN: CPT | Mod: S$GLB,,, | Performed by: NURSE PRACTITIONER

## 2022-08-03 PROCEDURE — 3074F PR MOST RECENT SYSTOLIC BLOOD PRESSURE < 130 MM HG: ICD-10-PCS | Mod: CPTII,S$GLB,, | Performed by: NURSE PRACTITIONER

## 2022-08-03 PROCEDURE — 3078F PR MOST RECENT DIASTOLIC BLOOD PRESSURE < 80 MM HG: ICD-10-PCS | Mod: CPTII,S$GLB,, | Performed by: NURSE PRACTITIONER

## 2022-08-03 PROCEDURE — 99999 PR PBB SHADOW E&M-EST. PATIENT-LVL III: CPT | Mod: PBBFAC,,, | Performed by: NURSE PRACTITIONER

## 2022-08-03 PROCEDURE — 81025 PR  URINE PREGNANCY TEST: ICD-10-PCS | Mod: S$GLB,,, | Performed by: NURSE PRACTITIONER

## 2022-08-03 PROCEDURE — 1159F MED LIST DOCD IN RCRD: CPT | Mod: CPTII,S$GLB,, | Performed by: NURSE PRACTITIONER

## 2022-08-03 PROCEDURE — 1160F PR REVIEW ALL MEDS BY PRESCRIBER/CLIN PHARMACIST DOCUMENTED: ICD-10-PCS | Mod: CPTII,S$GLB,, | Performed by: NURSE PRACTITIONER

## 2022-08-03 PROCEDURE — 1159F PR MEDICATION LIST DOCUMENTED IN MEDICAL RECORD: ICD-10-PCS | Mod: CPTII,S$GLB,, | Performed by: NURSE PRACTITIONER

## 2022-08-03 PROCEDURE — 99999 PR PBB SHADOW E&M-EST. PATIENT-LVL III: ICD-10-PCS | Mod: PBBFAC,,, | Performed by: NURSE PRACTITIONER

## 2022-08-03 RX ORDER — NORGESTIMATE AND ETHINYL ESTRADIOL 0.25-0.035
1 KIT ORAL DAILY
Qty: 84 TABLET | Refills: 1 | Status: SHIPPED | OUTPATIENT
Start: 2022-08-03 | End: 2022-09-12

## 2022-08-03 NOTE — PROGRESS NOTES
"Subjective:       Patient ID: Iris Johnson is a 27 y.o. female.    Chief Complaint:  Contraception      History of Present Illness  HPI   present for birth control  Has questions about her medications as they are considering pregnancy  Has tried IUD and nuvaring with irregular bleeding  Would like to restart sprintec -- "the only thing that works"    GYN & OB History  Patient's last menstrual period was 2022 (approximate).   Date of Last Pap: No result found    OB History    Para Term  AB Living   1 1 1     1   SAB IAB Ectopic Multiple Live Births                  # Outcome Date GA Lbr Karlo/2nd Weight Sex Delivery Anes PTL Lv   1 Term      Vag-Spont          Review of Systems  Review of Systems   Genitourinary: Positive for menstrual problem.   Neurological: Positive for headaches.   All other systems reviewed and are negative.          Objective:      Physical Exam:   Constitutional: She is oriented to person, place, and time. She appears well-developed and well-nourished. No distress.    HENT:   Head: Normocephalic and atraumatic.    Eyes:   Unable to assess eyes-shades      Pulmonary/Chest: Effort normal.                  Musculoskeletal: Normal range of motion and moves all extremeties.       Neurological: She is alert and oriented to person, place, and time.    Skin: Skin is warm and dry. No rash noted. She is not diaphoretic. No erythema. No pallor.    Psychiatric: She has a normal mood and affect. Her behavior is normal. Judgment and thought content normal.             Assessment:        1. OCP (oral contraceptive pills) initiation               Plan:   Discussed medications with her in detail  Most of them not ok for pregnancy; f/u with psychiatrist and PCP for med management    Risks and benefits of birth control discussed with pt in detail; discussed the decreased effectiveness of OCPs with lamictal  Pt accepts risks and would like to still use it  Discussed risks of DVT " development with birth control use.  Pt denies history of blood clots, DVT, cardiac issues, HTN, CVA, gallbladder disease, liver disease, smoking, migraines with an aura, or adrenal disease.  Pt denies family hx of DVT.    rx sent for sprintec per pt request.    Safe sex practices discussed.    Continue annual well woman exam.    OCP (oral contraceptive pills) initiation  -     POCT urine pregnancy  -     norgestimate-ethinyl estradioL (ORTHO-CYCLEN) 0.25-35 mg-mcg per tablet; Take 1 tablet by mouth once daily.  Dispense: 84 tablet; Refill: 1

## 2022-08-10 ENCOUNTER — PATIENT MESSAGE (OUTPATIENT)
Dept: PRIMARY CARE CLINIC | Facility: CLINIC | Age: 27
End: 2022-08-10
Payer: COMMERCIAL

## 2022-08-18 ENCOUNTER — PATIENT MESSAGE (OUTPATIENT)
Dept: OBSTETRICS AND GYNECOLOGY | Facility: CLINIC | Age: 27
End: 2022-08-18
Payer: COMMERCIAL

## 2022-08-19 ENCOUNTER — TELEPHONE (OUTPATIENT)
Dept: OBSTETRICS AND GYNECOLOGY | Facility: CLINIC | Age: 27
End: 2022-08-19
Payer: COMMERCIAL

## 2022-08-19 NOTE — TELEPHONE ENCOUNTER
Spoke with patient and informed patient to discuss with her neurologist regarding lamictal per CAMILO Leal. Patient voiced understanding.

## 2022-09-12 ENCOUNTER — TELEPHONE (OUTPATIENT)
Dept: NEUROLOGY | Facility: CLINIC | Age: 27
End: 2022-09-12
Payer: COMMERCIAL

## 2022-09-12 ENCOUNTER — PROCEDURE VISIT (OUTPATIENT)
Dept: NEUROLOGY | Facility: CLINIC | Age: 27
End: 2022-09-12
Payer: COMMERCIAL

## 2022-09-12 VITALS
RESPIRATION RATE: 17 BRPM | WEIGHT: 113.31 LBS | SYSTOLIC BLOOD PRESSURE: 108 MMHG | BODY MASS INDEX: 22.25 KG/M2 | DIASTOLIC BLOOD PRESSURE: 72 MMHG | HEIGHT: 60 IN | HEART RATE: 83 BPM

## 2022-09-12 DIAGNOSIS — G43.719 INTRACTABLE CHRONIC MIGRAINE WITHOUT AURA AND WITHOUT STATUS MIGRAINOSUS: Primary | ICD-10-CM

## 2022-09-12 PROCEDURE — 64615 CHEMODENERV MUSC MIGRAINE: CPT | Mod: S$GLB,,, | Performed by: NURSE PRACTITIONER

## 2022-09-12 PROCEDURE — 64615 PR CHEMODENERVATION OF MUSCLE FOR CHRONIC MIGRAINE: ICD-10-PCS | Mod: S$GLB,,, | Performed by: NURSE PRACTITIONER

## 2022-09-12 NOTE — TELEPHONE ENCOUNTER
Spoke with pt and let pt know provider is out of office. Pt understood and rescheduled to different provider. Verbalized understanding.

## 2022-09-12 NOTE — PROCEDURES
Procedures     BOTOX PROCEDURE    PROCEDURE PERFORMED: Botulinum toxin injection (80920)    CLINICAL INDICATION: G43.719    Cycle #3    A time out was conducted just before the start of the procedure to verify the correct patient and procedure, procedure location, and all relevant critical information.   Signed consent obtained prior to procedure     Conventional methods of treatment but the patient has been unresponsive and refractory.The patient meets criteria for chronic headaches according to the ICHD-III, the patient has more than 15 headaches a month at least 8 of them meet migraine criteria, which last for more than 4 hours a day.     Last Botox injections were on 6/20/22  and  there has been over 50%  improvement in the patient's symptoms. Frequency of treatment is every 12 weeks unless no response to the treatments, at which time we will discontinue the injections.     DESCRIPTION OF PROCEDURE: After obtaining informed consent and under  aseptic technique, a total of 155 units of botulinum toxin type A were   injected in the following muscles: Procerus 5 units,  5 units  bilaterally, frontalis 20 units, temporalis 20 units bilaterally,  occipitalis 15 units, upper cervical paraspinals 10 units bilaterally and trapezius 15 units bilaterally. The patient was given a total of 155 units in 31 sites.    The patient tolerated the procedure well. There were no complications. The patient was given a prescription for repeat treatment in 12 weeks.     Unavoidable waste 45 units    RTC in 6 weeks.   SERA Goodman

## 2022-09-22 ENCOUNTER — PATIENT MESSAGE (OUTPATIENT)
Dept: PRIMARY CARE CLINIC | Facility: CLINIC | Age: 27
End: 2022-09-22
Payer: COMMERCIAL

## 2022-10-06 ENCOUNTER — PATIENT MESSAGE (OUTPATIENT)
Dept: PRIMARY CARE CLINIC | Facility: CLINIC | Age: 27
End: 2022-10-06
Payer: COMMERCIAL

## 2022-10-24 ENCOUNTER — PATIENT MESSAGE (OUTPATIENT)
Dept: PRIMARY CARE CLINIC | Facility: CLINIC | Age: 27
End: 2022-10-24
Payer: COMMERCIAL

## 2022-10-24 DIAGNOSIS — Z76.89 ENCOUNTER TO ESTABLISH CARE: Primary | ICD-10-CM

## 2022-10-24 DIAGNOSIS — Z31.9 PATIENT DESIRES PREGNANCY: ICD-10-CM

## 2022-10-24 RX ORDER — VITAMIN A, ASCORBIC ACID, CHOLECALCIFEROL, .ALPHA.-TOCOPHEROL ACETATE, DL-, THIAMINE MONONITRATE, RIBOFLAVIN, NIACINAMIDE, PYRIDOXINE HYDROCHLORIDE, FOLIC ACID, CYANOCOBALAMIN, CALCIUM CARBONATE, IRON, ZINC OXIDE, AND CUPRIC OXIDE 4000; 120; 400; 22; 1.84; 3; 20; 10; 1; 12; 200; 29; 25; 2 [IU]/1; MG/1; [IU]/1; [IU]/1; MG/1; MG/1; MG/1; MG/1; MG/1; UG/1; MG/1; MG/1; MG/1; MG/1
1 TABLET ORAL DAILY
Qty: 90 TABLET | Refills: 3 | Status: SHIPPED | OUTPATIENT
Start: 2022-10-24 | End: 2023-01-17

## 2022-10-25 ENCOUNTER — OFFICE VISIT (OUTPATIENT)
Dept: NEUROLOGY | Facility: CLINIC | Age: 27
End: 2022-10-25
Payer: COMMERCIAL

## 2022-10-25 DIAGNOSIS — F31.9 BIPOLAR 1 DISORDER: ICD-10-CM

## 2022-10-25 DIAGNOSIS — G43.719 INTRACTABLE CHRONIC MIGRAINE WITHOUT AURA AND WITHOUT STATUS MIGRAINOSUS: Primary | ICD-10-CM

## 2022-10-25 PROCEDURE — 1159F PR MEDICATION LIST DOCUMENTED IN MEDICAL RECORD: ICD-10-PCS | Mod: CPTII,95,, | Performed by: NURSE PRACTITIONER

## 2022-10-25 PROCEDURE — 99213 PR OFFICE/OUTPT VISIT, EST, LEVL III, 20-29 MIN: ICD-10-PCS | Mod: 95,,, | Performed by: NURSE PRACTITIONER

## 2022-10-25 PROCEDURE — 1160F PR REVIEW ALL MEDS BY PRESCRIBER/CLIN PHARMACIST DOCUMENTED: ICD-10-PCS | Mod: CPTII,95,, | Performed by: NURSE PRACTITIONER

## 2022-10-25 PROCEDURE — 99213 OFFICE O/P EST LOW 20 MIN: CPT | Mod: 95,,, | Performed by: NURSE PRACTITIONER

## 2022-10-25 PROCEDURE — 1160F RVW MEDS BY RX/DR IN RCRD: CPT | Mod: CPTII,95,, | Performed by: NURSE PRACTITIONER

## 2022-10-25 PROCEDURE — 1159F MED LIST DOCD IN RCRD: CPT | Mod: CPTII,95,, | Performed by: NURSE PRACTITIONER

## 2022-10-25 RX ORDER — RIMEGEPANT SULFATE 75 MG/75MG
75 TABLET, ORALLY DISINTEGRATING ORAL DAILY PRN
Qty: 8 TABLET | Refills: 11 | Status: SHIPPED | OUTPATIENT
Start: 2022-10-25 | End: 2022-12-05 | Stop reason: SDUPTHER

## 2022-10-25 NOTE — PROGRESS NOTES
[facility]    Chief complaint: chronic migraine     S:    27 Y ambidextrous F with PMHx of ADHD, iron deficiency anemia, bipolar 1 disorder, OCD, anxiety, depression, insomnia who presents for further  evaluation of headache. She has completed 3 cycles of Botox.     INTERVAL HISTORY 10/25/22  The patient location is: home  The chief complaint leading to consultation is: follow up  Visit type: audiovisual  Face to Face time with patient: 15  20 minutes of total time spent on the encounter, which includes face to face time and non-face to face time preparing to see the patient (eg, review of tests), Obtaining and/or reviewing separately obtained history, Documenting clinical information in the electronic or other health record, Independently interpreting results (not separately reported) and communicating results to the patient/family/caregiver, or Care coordination (not separately reported).   Each patient to whom he or she provides medical services by telemedicine is:  (1) informed of the relationship between the physician and patient and the respective role of any other health care provider with respect to management of the patient; and (2) notified that he or she may decline to receive medical services by telemedicine and may withdraw from such care at any time.    Notes:     Previously She had completed 2 cycles of Botox. She mentions that after the Botox cycle 3 on 6/20/22 she had a migraine x 12 days. She treated with nurtec, eletriptan and sumatriptan SC with limited success. nurtec helped some, but eletriptan did not help at all. After those 12 days she is much better. She has had 2 migraine attacks last month and they were 2-4 days long. She feels that her acute medications work in the follow order from best to worse: sumatriptan sc (makes her have worsening pain, dizziness for about 30 minutes so has to lay down, but improves the intensity, is functional after a few  hours, but migraine does not necessarily resolve) > Nurtec (if takes it and goes to bed typically wakes up attack free, but has to take it early >>eletriptan 40mg (has only a very small window to treat very early in process, if waits too long then will not be effect. Overall she is over 50% improved, but still battling the longer migraine attacks. She is open to other options for acute manageent. She does find Botox works well. The first Btx caused 10 day of migraine attack. The second did not. She is not sure if it was because she presented with severe migraine already that the headache occurred for so long. She has been having more neck pain and feels that might be triggering some of the headache as well. Typically will have neck pain along with the headache. She would like to continue Botox for prevention.    Today she is six weeks s/p third Botox and reports much better. She reports fewer headaches. In the past 2 months, she has had 3 migraines. Current pain 0 with range 0-9. She takes Nurtec, eletriptan, and sumatriptan IM depending on severity. She has tried DHE NS with success too. She is having some dental issues which is causing some facial pain and headaches. She is planning to conceive when possible. She is on Seroquel and Lamictal. She has plans to stay on Seroquel but will start tapering off Lamictal. She wishes to stay on Botox when/if pregnant. Otherwise information below is reviewed and verified with no changes made    Headache history from initial evaluation on 11/30/21:  Age at onset and course over time: she had them since elementary school, she reports that she has been having over the years more days of headache since childhood. She mentions that since getting dark glasses in 2017 they improved some. She mentions that the only time she did not have migraine was during the pregnancy. She mentions that she even had Daith Piercing which did not work. She mentions that she could before push through  day #4 of migraine, but she is mentions that she is having more disabling attacks.    Location: entire head   Character: stabbing, pressure, throbbing/pounding, sharp   Intensity:  3-10/10  Has about 10 days of no headache   Frequency: 20/30 days   She gets a sick feeling like she knows an attack will hit her. She mentions that if she does not wake up with one in the morning, by 2 pm it just hit her and just starts at once and all the symptoms start at once. She mentions that when she leaves work and has things to do and she will have worsening when she is doing things. She mentions that she has not been to work in over 2 weeks now . She mentions that she would leave at lunch time to go home and has not been back.   Duration: minimum 1 week of migraine , when it's severe she feels unsafe.   She is waking up with them, but if does not wake up with them by 2 pm she will have sudden onset of the attack.   She will use APAP/NSAID and will use   Aura: none   Associated symptoms: photophobia, phonophobia, osmophobia, kinesiophobia, neck tightness/pain, nausea/vomiting  Other neurologic symptoms: dizziness, vertigo, ringing in the ears, mood changes, problems with concentration, memory, task completion, relaxation, neck tightness, neck pain.   Precipitating factors: light, noise  Alleviating factors: darkness, heat, ice, massage, local pressure  Aggravating factors: bright lights, loud noises  High pitched steady ringing in right ear  Family history of headache: paternal cousins, paternal grandmother has headache all the time  ER/UC visits:   Caffeine: 4 cups of coffee  Sleep: trouble falling asleep, trouble staying asleep, un refreshing sleep     Medication history:  Acute:  apap  nsaid- ibuprofen, asa, naproxen  fioricet  excedrin  Hydrocodone   Sumatriptan 20mg NS - does not like this  Sumatriptan 6mg sc - works well at pain relief, but then 30 minutes of feeling terrible after it so she holds off on using.   Eletriptan  40mg - can help , but very small window very early in migraine, otherwise did not work.   Nurtec ODT 75mg - helps some earlier in atacks.     Preventive:  Lamotrigine 200 mg for mood stabilizer.   seroquel 300 mg nightly   pregabalin  topiramate   Desipramine   Fluoxetine   Bupropion   Citalopram  escitalopram  Gabapentin   Diazepam  Alprazolam   Lorazepam   clonazepam  Botox 155 units started on 12/21- present: over 50% improvement     Has never had CGRP Mab     Neuroimaging and other studies:   She has had MRIs int he past and has been told normal.   She has metal in her tongue that is stuck and is trying to find someone to surgically remove it.     ROS: The fourteen point review of system was performed.   Constitutional:  Denies fevers/cold or heat intolerance, weight loss/gain or fatigue.  Eyes:                        Denies diplopia, ptosis, visual field defects or ocular disease   excepting any listed above.  ENT:   Denies hearing loss, infection, carcinoma or other diseases excepting any listed above.   Cardiovascular:  Denies stroke, CAD, arrhythmia, CHF or other disease excepting any listed above.  Pulmonary:  Denies dyspnea, COPD, RAD or infection or neoplasm excepting any listed above.  Gastrointestinal: Denies ulcer disease, liver or other disease excepting any listed above.  Skin:   Denies rash, skin cancer, or other cutaneous disorder excepting any listed above.  Neurological:  See HPI  Musculoskeletal: Rt hand pain, weakness   Heme/Lymphatic: Denies any blood or lymph system neoplasm or disorder excepting any listed above.  Endocrine:  Denies any thyroid or other disorders, excepting any listed above.  Allergic/Immuno: Denies any autoimmune disease or allergy excepting any listed above.  Psychiatric:  Mood improved some   Urologic:  Denies any difficulties with the urinary system or sexual function except noted above.    No changes to past medical history, surgical or family history since last  appointment.   Social history:  Occupation: construction, manual labor, operating equipment.   Currently on CDL      7.5y son  No plans of further pregnancies.   Social History     Tobacco Use    Smoking status: Every Day     Packs/day: 0.25     Types: Vaping with nicotine, Cigarettes    Smokeless tobacco: Never   Substance Use Topics    Alcohol use: Yes     Comment: 1-2 drinks lper week    Drug use: Not Currently     Types: Amphetamines, Barbituates, Benzodiazepines, Cocaine, Heroin, Marijuana     Comment: narcotics, ecstacy (all prior use)     Review of patient's allergies indicates:   Allergen Reactions    Bactrim [sulfamethoxazole-trimethoprim]     Sulfa (sulfonamide antibiotics)        Current Outpatient Medications:     dextroamphetamine-amphetamine 30 mg Tab, Take 1 tablet (30 mg total) by mouth 2 (two) times daily., Disp: 60 tablet, Rfl: 0    dihydroergotamine (TRUDHESA) 0.725 mg/pump act. (4 mg/mL) Spry, 1 spray per nostril PRN migraine, can repeat after 1 hour. Max 4/day., Disp: 8 mL, Rfl: 6    eletriptan (RELPAX) 40 MG tablet, Take 1 tablet (40 mg total) by mouth as needed (migraine). may repeat in 2 hours if necessary. Max is 2/day., Disp: 9 tablet, Rfl: 6    lamoTRIgine (LAMICTAL) 200 MG tablet, Take 1 tablet (200 mg total) by mouth nightly., Disp: 90 tablet, Rfl: 3    ondansetron (ZOFRAN) 4 MG tablet, Take 1 tablet (4 mg total) by mouth every 8 (eight) hours as needed for Nausea., Disp: 15 tablet, Rfl: 6    PNV,calcium 72-iron,carb-folic (PRENATAL PLUS) 29 mg iron- 1 mg Tab, Take 1 tablet by mouth once daily., Disp: 90 tablet, Rfl: 3    QUEtiapine (SEROQUEL) 300 MG Tab, Take 1 tablet (300 mg total) by mouth every evening., Disp: 90 tablet, Rfl: 3    rimegepant (NURTEC) 75 mg odt, Take 1 tablet (75 mg total) by mouth daily as needed. Place ODT tablet on the tongue; alternatively the ODT tablet may be placed under the tongue, Disp: 8 tablet, Rfl: 11    SUMAtriptan (IMITREX) 20 mg/actuation nasal  spray, 1 spray (20 mg total) by Nasal route as needed for Migraine (can repeat after 2 hours. max 2/day.)., Disp: 6 each, Rfl: 3    SUMAtriptan succinate (IMITREX) 4 mg/0.5 mL PnIj, Inject 4 mg into the skin as needed (migraine, can repeat after 2 hours. max 3/day.)., Disp: 3 mL, Rfl: 6    Current Facility-Administered Medications:     onabotulinumtoxina injection 200 Units, 200 Units, Intramuscular, q12 weeks, Deb Aguirre, NP, 200 Units at 09/12/22 0921    PHYSICAL EXAMINATION  There were no vitals filed for this visit.   Constitutional:   She appears well-developed and well-nourished. She is well groomed     Neurological Exam:  General: well-developed, well-nourished, no distress  Mental status: Awake and alert  Speech language: No dysarthria or aphasia on conversation  Cranial nerves: Face symmetric        Impression:  Chronic migraine without aura - she has had lifelong migraine disease which has been chronic for many years without much improvement on oral preventives. She has been overusing OTC medications to get through the day and manage. She has been on OCPs for 7.5 years now without any worsening headache. Initially she was having 20/30 days of severe migraine with significant disability as per MIDAS score and history. Since third Botox for chornic migraine she has had in the last 6 weeks 1- migraine attacks, they are lasting less to 2 days and feels sumatriptan is working, but does not like the sumatriptan effect. We will transition her to 4mg sc. Also will provide Eletriptan 40mg for acute management of milder attacks and Nurtec to see if adding that will also provide benefit without return of migraine. She is actively trying to conceive. We discussed we do not recommend DHE or triptans or gepants during pregnancy. We can continue Botox if ok with OB and she understands risks/benefits     Medication overuse hadache - resolved.     Comorbidities:  ADHD -> Adderall    iron deficiency anemia     bipolar 1 disorder - sees psychiatry currently on Lamotrigine 200mg and Seroquel 300mg    OCD, anxiety, depression, insomnia -> behavioral health     Plan:   1- For preventive management: a. Continue Botox q 12weeks  Muscles to be injected:  total of 155 units of botulinum toxin type A were  injected in the following muscles: Procerus 5 units,  5 units bilaterally, frontalis 20 units, temporalis 20 units bilaterally, occipitalis 15 units, upper cervical paraspinals 10 units bilaterally and trapezius 15 units bilaterally. Total of 155 units in 31 sites.          B. Toradol 60MG IM for day of Botox          C. If continues  Having at least 4 days of migraine per month consider addition of CGRP Mab to regimen.   2- Acute management:   For mild attacks catching early  eletriptan 40 mg at onset, can repeat after 2 hours. Max 2/day and/or  Nurtec 75mg . Max 1/day  For sudden onset migraine, severe attack, nausea/vomiting:      A. Sumatriptan 6 mg sc, can repeat after 2 hours. Max 3/day.   OR   B. Trudhesa 1 spray per nostril, can repeat after 1 hour. Max 4 sprays per day     Patient will trial trudhesa and see if it is more effective than sumatriptan SC as this does not always resolve attacks    Can not take sumatriptan injection and/or eletriptan with Trudhesa in same 24 hour period. Do not take these over 10 days a month. .    You can mix with Nurtec ODT 75mg with trudhesa or the triptans.     For nausea: Zofran ODT 4 mg every 8 hours as needed     3- Mini prophylaxis  For migraine around Nurtec day prior to Botox x 3-5 days.     RTC in 6 weeks for Botox.         SERA Goodman

## 2022-11-01 ENCOUNTER — PATIENT MESSAGE (OUTPATIENT)
Dept: PRIMARY CARE CLINIC | Facility: CLINIC | Age: 27
End: 2022-11-01

## 2022-11-02 ENCOUNTER — OFFICE VISIT (OUTPATIENT)
Dept: PRIMARY CARE CLINIC | Facility: CLINIC | Age: 27
End: 2022-11-02
Payer: COMMERCIAL

## 2022-11-02 DIAGNOSIS — F90.9 ATTENTION DEFICIT HYPERACTIVITY DISORDER (ADHD), UNSPECIFIED ADHD TYPE: Primary | ICD-10-CM

## 2022-11-02 PROCEDURE — 1159F PR MEDICATION LIST DOCUMENTED IN MEDICAL RECORD: ICD-10-PCS | Mod: CPTII,95,, | Performed by: FAMILY MEDICINE

## 2022-11-02 PROCEDURE — 1160F RVW MEDS BY RX/DR IN RCRD: CPT | Mod: CPTII,95,, | Performed by: FAMILY MEDICINE

## 2022-11-02 PROCEDURE — 1160F PR REVIEW ALL MEDS BY PRESCRIBER/CLIN PHARMACIST DOCUMENTED: ICD-10-PCS | Mod: CPTII,95,, | Performed by: FAMILY MEDICINE

## 2022-11-02 PROCEDURE — 1159F MED LIST DOCD IN RCRD: CPT | Mod: CPTII,95,, | Performed by: FAMILY MEDICINE

## 2022-11-02 PROCEDURE — 99214 OFFICE O/P EST MOD 30 MIN: CPT | Mod: 95,,, | Performed by: FAMILY MEDICINE

## 2022-11-02 PROCEDURE — 99214 PR OFFICE/OUTPT VISIT, EST, LEVL IV, 30-39 MIN: ICD-10-PCS | Mod: 95,,, | Performed by: FAMILY MEDICINE

## 2022-11-02 RX ORDER — DEXTROAMPHETAMINE SACCHARATE, AMPHETAMINE ASPARTATE, DEXTROAMPHETAMINE SULFATE AND AMPHETAMINE SULFATE 7.5; 7.5; 7.5; 7.5 MG/1; MG/1; MG/1; MG/1
30 TABLET ORAL 2 TIMES DAILY
Qty: 60 TABLET | Refills: 0 | Status: SHIPPED | OUTPATIENT
Start: 2023-01-01 | End: 2023-01-24

## 2022-11-02 RX ORDER — DEXTROAMPHETAMINE SACCHARATE, AMPHETAMINE ASPARTATE, DEXTROAMPHETAMINE SULFATE AND AMPHETAMINE SULFATE 7.5; 7.5; 7.5; 7.5 MG/1; MG/1; MG/1; MG/1
30 TABLET ORAL 2 TIMES DAILY
Qty: 60 TABLET | Refills: 0 | Status: SHIPPED | OUTPATIENT
Start: 2022-11-02 | End: 2022-11-17 | Stop reason: SDUPTHER

## 2022-11-02 RX ORDER — DEXTROAMPHETAMINE SACCHARATE, AMPHETAMINE ASPARTATE, DEXTROAMPHETAMINE SULFATE AND AMPHETAMINE SULFATE 7.5; 7.5; 7.5; 7.5 MG/1; MG/1; MG/1; MG/1
30 TABLET ORAL 2 TIMES DAILY
Qty: 60 TABLET | Refills: 0 | Status: SHIPPED | OUTPATIENT
Start: 2022-12-02 | End: 2023-01-01

## 2022-11-03 NOTE — PROGRESS NOTES
[facility]       [address] [place] [address]      Phone: [phone]      Fax: [phone]    Gurinder Perez MD                Video Visit  11/02/2022        27-year-old female presents today via video visit for checkup, med refills.    Patient states she feels relatively good today.  Has been having some neck pains.  These make her headaches worse.  Saw the neck specialist, had an MRI done.  Offered to do injections.    Follows with Neurology for migraines.    Has ADHD.  Currently taking Adderall 30 mg, twice a day.  Current dose working well for her.  States it does not affect her sleep.  Helps with focus, concentration.    She denies any chest pain, shortness breast.  Denies fever, chills, body aches.  Denies coughing, sneezing, URI type symptoms.  States appetite is okay.  She tries to drink lots of water each day.  States bowel movements have been more regular lately.  She usually struggles with constipation, very irregular.  Has been going to the bathroom more frequently lately.  Uses essential oils to help with this.  Denies any urinary issues.    Also has bipolar 1 disorder.  Was taking Lamictal, Seroquel for this.  Decided to stop taking the Lamictal.  Seems to be doing okay without it.  Still taking Seroquel for mood, sleep.  Feels okay on this medication.  Was able to get an appointment with Dr. Galvez, will be seeing her soon.      Still not trying to prevent pregnancy.  Taking prenatal vitamins daily.  Did get referral to Ob, but did not make appointment.  Will be waiting until she has a positive pregnancy test.    PMH:  ADHD, bipolar, insomnia, depression, OCD, panic attacks, migraines.  PSH:  Nose.  Tonsils.  Leander teeth.  F MH:  Skin cancer.  Breast cancer.  CAD.  HTN.  Bipolar.  Alcohol abuse.  Allergies:  Sulfa drugs cause hives  Social:  Works in construction, operate heavy machinery.  T:  Denies  A:  Denies  D:  THC-has medical  marijuana card.  Smokes.    Exercise:  No regular exercise program.  Lots of physical activity at work.    Gyn:  Dr. Hassan at [facility] (Wants closer gyn. Has appt on Wed in [facility].)    Pap:  11/22/2021.  Repeat 3 years (2024)      The following were updated and reviewed by myself in the chart: medications, past medical history, past surgical history, family history, social history, and allergies.     Medications:  Current Outpatient Medications on File Prior to Visit   Medication Sig Dispense Refill    dihydroergotamine (TRUDHESA) 0.725 mg/pump act. (4 mg/mL) Spry 1 spray per nostril PRN migraine, can repeat after 1 hour. Max 4/day. 8 mL 6    eletriptan (RELPAX) 40 MG tablet Take 1 tablet (40 mg total) by mouth as needed (migraine). may repeat in 2 hours if necessary. Max is 2/day. 9 tablet 6    ondansetron (ZOFRAN) 4 MG tablet Take 1 tablet (4 mg total) by mouth every 8 (eight) hours as needed for Nausea. 15 tablet 6    PNV,calcium 72-iron,carb-folic (PRENATAL PLUS) 29 mg iron- 1 mg Tab Take 1 tablet by mouth once daily. 90 tablet 3    QUEtiapine (SEROQUEL) 300 MG Tab Take 1 tablet (300 mg total) by mouth every evening. 90 tablet 3    rimegepant (NURTEC) 75 mg odt Take 1 tablet (75 mg total) by mouth daily as needed. Place ODT tablet on the tongue; alternatively the ODT tablet may be placed under the tongue 8 tablet 11    SUMAtriptan (IMITREX) 20 mg/actuation nasal spray 1 spray (20 mg total) by Nasal route as needed for Migraine (can repeat after 2 hours. max 2/day.). 6 each 3    SUMAtriptan succinate (IMITREX) 4 mg/0.5 mL PnIj Inject 4 mg into the skin as needed (migraine, can repeat after 2 hours. max 3/day.). 3 mL 6     Current Facility-Administered Medications on File Prior to Visit   Medication Dose Route Frequency Provider Last Rate Last Admin    onabotulinumtoxina injection 200 Units  200 Units Intramuscular q12 weeks Deb Aguirre NP   200 Units at 09/12/22 0921        PMHx:  Past Medical History:    Diagnosis Date    ADHD (attention deficit hyperactivity disorder)     Anemia     Bipolar disorder     Chicken pox     childhood    Headache     Insomnia     Migraine     Substance abuse     prior use, no current use    Syncope 2009    admitted x 1      Patient Active Problem List    Diagnosis Date Noted    Anxiety 08/03/2022    Attention deficit hyperactivity disorder (ADHD), predominantly inattentive type 08/03/2022    Bipolar disorder 08/03/2022    Insomnia 08/03/2022    Migraine 08/03/2022    Intractable chronic migraine without aura and without status migrainosus 12/27/2021    CTS (carpal tunnel syndrome) 12/14/2021        PSHx:  Past Surgical History:   Procedure Laterality Date    dental extraction (2013)  2013    RHINOPLASTY  2010        FHx:  Family History   Problem Relation Age of Onset    Skin cancer Mother     Bipolar disorder Mother     Cervical cancer Mother     Hyperlipidemia Father     Hyperlipidemia Paternal Grandmother     Breast cancer Paternal Grandmother 55    Asthma Paternal Grandmother     Depression Paternal Grandmother     Hyperlipidemia Paternal Grandfather     Heart disease Paternal Grandfather     Ovarian cancer Neg Hx     Colon cancer Neg Hx         Social:  Social History     Socioeconomic History    Marital status:     Number of children: 1   Occupational History    Occupation:      Comment: [employer]   Tobacco Use    Smoking status: Every Day     Packs/day: 0.25     Types: Vaping with nicotine, Cigarettes    Smokeless tobacco: Never   Substance and Sexual Activity    Alcohol use: Yes     Comment: 1-2 drinks lper week    Drug use: Not Currently     Types: Amphetamines, Barbituates, Benzodiazepines, Cocaine, Heroin, Marijuana     Comment: narcotics, ecstacy (all prior use)    Sexual activity: Yes     Partners: Male     Comment: birth control pill        Allergies:  Review of patient's allergies indicates:   Allergen Reactions    Bactrim [sulfamethoxazole-trimethoprim]      Sulfa (sulfonamide antibiotics)         ROS:  Review of Systems   Constitutional:  Negative for activity change, appetite change, chills and fever.   HENT:  Negative for congestion, postnasal drip, rhinorrhea, sore throat and trouble swallowing.    Respiratory:  Negative for cough, shortness of breath and wheezing.    Cardiovascular:  Negative for chest pain and palpitations.   Gastrointestinal:  Negative for abdominal pain, constipation, diarrhea, nausea and vomiting.   Genitourinary:  Negative for difficulty urinating.   Musculoskeletal:  Positive for arthralgias and neck pain.   Skin:  Negative for color change and rash.   Neurological:  Positive for headaches. Negative for speech difficulty.   All other systems reviewed and are negative.       Objective      There were no vitals taken for this visit.  Ht Readings from Last 3 Encounters:   09/12/22 5' (1.524 m)   08/01/22 5' (1.524 m)   06/20/22 5' (1.524 m)     Wt Readings from Last 3 Encounters:   09/12/22 51.4 kg (113 lb 5.1 oz)   08/03/22 49.6 kg (109 lb 5.6 oz)   08/01/22 50.3 kg (110 lb 14.3 oz)       PHYSICAL EXAM:  Physical Exam  Constitutional:       General: She is not in acute distress.     Appearance: She is not ill-appearing.   HENT:      Head: Normocephalic and atraumatic.      Right Ear: External ear normal.      Left Ear: External ear normal.      Nose: No congestion.   Pulmonary:      Effort: Pulmonary effort is normal. No respiratory distress.   Neurological:      Mental Status: She is alert.   Psychiatric:         Mood and Affect: Mood normal.         Behavior: Behavior normal.         Thought Content: Thought content normal.         Judgment: Judgment normal.            LABS / IMAGING:  No results found for this or any previous visit (from the past 4368 hour(s)).      Assessment    1. Attention deficit hyperactivity disorder (ADHD), unspecified ADHD type          Plan    Diagnoses and all orders for this visit:    Attention deficit  hyperactivity disorder (ADHD), unspecified ADHD type  -     dextroamphetamine-amphetamine (ADDERALL) 30 mg Tab; Take 1 tablet (30 mg total) by mouth 2 (two) times a day.  -     dextroamphetamine-amphetamine (ADDERALL) 30 mg Tab; Take 1 tablet (30 mg total) by mouth 2 (two) times a day.  -     dextroamphetamine-amphetamine (ADDERALL) 30 mg Tab; Take 1 tablet (30 mg total) by mouth 2 (two) times a day.        FOLLOW-UP:  Follow up in about 3 months (around 2/2/2023) for check up, med refill.    The patient location is: [place]  The chief complaint leading to consultation is: med refill    Visit type: audiovisual    Face to Face time with patient: 20 min    35 minutes of total time spent on the encounter, which includes face to face time and non-face to face time preparing to see the patient (eg, review of tests), Obtaining and/or reviewing separately obtained history, Documenting clinical information in the electronic or other health record, Independently interpreting results (not separately reported) and communicating results to the patient/family/caregiver, or Care coordination (not separately reported).     Each patient to whom he or she provides medical services by telemedicine is:  (1) informed of the relationship between the physician and patient and the respective role of any other health care provider with respect to management of the patient; and (2) notified that he or she may decline to receive medical services by telemedicine and may withdraw from such care at any time.    Signed by:  Gurinder Perez MD

## 2022-11-17 ENCOUNTER — PATIENT MESSAGE (OUTPATIENT)
Dept: PSYCHIATRY | Facility: CLINIC | Age: 27
End: 2022-11-17
Payer: COMMERCIAL

## 2022-11-17 ENCOUNTER — OFFICE VISIT (OUTPATIENT)
Dept: PSYCHIATRY | Facility: CLINIC | Age: 27
End: 2022-11-17
Payer: COMMERCIAL

## 2022-11-17 VITALS
BODY MASS INDEX: 22.04 KG/M2 | WEIGHT: 112.88 LBS | HEART RATE: 73 BPM | DIASTOLIC BLOOD PRESSURE: 74 MMHG | SYSTOLIC BLOOD PRESSURE: 109 MMHG

## 2022-11-17 DIAGNOSIS — F31.9 BIPOLAR 1 DISORDER: Primary | ICD-10-CM

## 2022-11-17 DIAGNOSIS — F90.9 ATTENTION DEFICIT HYPERACTIVITY DISORDER (ADHD), UNSPECIFIED ADHD TYPE: ICD-10-CM

## 2022-11-17 PROCEDURE — 3074F SYST BP LT 130 MM HG: CPT | Mod: CPTII,S$GLB,, | Performed by: PSYCHOLOGIST

## 2022-11-17 PROCEDURE — 99417 PROLNG OP E/M EACH 15 MIN: CPT | Mod: S$GLB,,, | Performed by: PSYCHOLOGIST

## 2022-11-17 PROCEDURE — 3078F PR MOST RECENT DIASTOLIC BLOOD PRESSURE < 80 MM HG: ICD-10-PCS | Mod: CPTII,S$GLB,, | Performed by: PSYCHOLOGIST

## 2022-11-17 PROCEDURE — 3074F PR MOST RECENT SYSTOLIC BLOOD PRESSURE < 130 MM HG: ICD-10-PCS | Mod: CPTII,S$GLB,, | Performed by: PSYCHOLOGIST

## 2022-11-17 PROCEDURE — 1159F MED LIST DOCD IN RCRD: CPT | Mod: CPTII,S$GLB,, | Performed by: PSYCHOLOGIST

## 2022-11-17 PROCEDURE — 99999 PR PBB SHADOW E&M-EST. PATIENT-LVL III: ICD-10-PCS | Mod: PBBFAC,,, | Performed by: PSYCHOLOGIST

## 2022-11-17 PROCEDURE — 99999 PR PBB SHADOW E&M-EST. PATIENT-LVL III: CPT | Mod: PBBFAC,,, | Performed by: PSYCHOLOGIST

## 2022-11-17 PROCEDURE — 99205 OFFICE O/P NEW HI 60 MIN: CPT | Mod: S$GLB,,, | Performed by: PSYCHOLOGIST

## 2022-11-17 PROCEDURE — 3078F DIAST BP <80 MM HG: CPT | Mod: CPTII,S$GLB,, | Performed by: PSYCHOLOGIST

## 2022-11-17 PROCEDURE — 3008F PR BODY MASS INDEX (BMI) DOCUMENTED: ICD-10-PCS | Mod: CPTII,S$GLB,, | Performed by: PSYCHOLOGIST

## 2022-11-17 PROCEDURE — 3008F BODY MASS INDEX DOCD: CPT | Mod: CPTII,S$GLB,, | Performed by: PSYCHOLOGIST

## 2022-11-17 PROCEDURE — 1159F PR MEDICATION LIST DOCUMENTED IN MEDICAL RECORD: ICD-10-PCS | Mod: CPTII,S$GLB,, | Performed by: PSYCHOLOGIST

## 2022-11-17 PROCEDURE — 99205 PR OFFICE/OUTPT VISIT, NEW, LEVL V, 60-74 MIN: ICD-10-PCS | Mod: S$GLB,,, | Performed by: PSYCHOLOGIST

## 2022-11-17 PROCEDURE — 99417 PR PROLONGED SVC, OUTPT, W/WO DIRECT PT CONTACT,  EA ADDTL 15 MIN: ICD-10-PCS | Mod: S$GLB,,, | Performed by: PSYCHOLOGIST

## 2022-11-17 RX ORDER — DEXTROAMPHETAMINE SACCHARATE, AMPHETAMINE ASPARTATE, DEXTROAMPHETAMINE SULFATE AND AMPHETAMINE SULFATE 7.5; 7.5; 7.5; 7.5 MG/1; MG/1; MG/1; MG/1
30 TABLET ORAL 2 TIMES DAILY
Qty: 60 TABLET | Refills: 0 | Status: SHIPPED | OUTPATIENT
Start: 2023-01-30 | End: 2023-01-24

## 2022-11-17 NOTE — PATIENT INSTRUCTIONS
"[facility] DEPARTMENT OF PSYCHIATRY   PATIENT INFORMATION    We appreciate the opportunity to participate in your medical care and hope the following protocols will make it easier for you to receive quality treatment in our department.    PUNCTUALITY: Your appointment is scheduled for a fixed amount of time, reserved especially for you.  To get the benefit of your appointment, please arrive at least 15 minutes early to allow time for traffic, parking and registration.  Should you arrive more than 15 minutes late to your appointment, you will be rescheduled in order to assure your clinician has adequate time to assess you and provide helpful care.      APPOINTMENTS: Appointments are made by the nursing/front office staff or through the patient portal. Providers do not have access  to schedule appointments. Walk in appointments are not available. FOR EMERGENCIES, PLEASE GO THE CLOSEST EMERGENCY ROOM.    CANCELLATION/MISSED APPOINTMENTS:   In order to receive quality care, all appointments must be kept.  If you are unable to keep an appointment, please reschedule at least 3 days prior if possible. Late cancellations (within 24 hours of the appointment) and repeated no-show appointments may result in dismissal from the clinic. After two no show/late cancellation visits, you will receive a notice letter, alerting you to keep visits to prevent department dismissal. If another visit is missed after receipt of the notice, you will be discharged from the clinic. This policy is in effect to allow for other individuals on a long waiting list to be seen as soon as possible. Unlike other branches of medicine where several individuals can be scheduled in a 30 minute time slot, only one individual can be scheduled in any time slot in Psychiatry.     MESSAGES: For simple questions/concerns, you may contact your individual providers electronically through the "[facility]" portal or by calling [phone] " with messages relayed via office staff. If relevant, include pharmacy name and phone number, date of last visit and next scheduled visit, phone number where you can be reached throughout the day, and whether leaving a voicemail or message on an answering machine is acceptable. Messages will be returned by the Medical Assistant or Office Staff after your provider has reviewed the message.  Please allow 24 hours for a returned message before leaving another message. Messages will be checked each workday (Monday through Friday) during office hours (8:00 a.m. and 5:00 p.m.) and returned at most within one business day.  You may leave a non-urgent message after hours. Note that psychotherapy and medication management are not appropriate by telephone or the patient portal.    PRESCRIPTION REFILLS:  Please communicate with your prescriber about any refills you need during your appointment. You may also request refills through the [facility] portal (preferred) or by calling the clinic. Prescriptions will be filled during office hours.     Please do not wait until you are completely out of medication to request refills. Same day refills are not always possible. Patients may experience symptoms of withdrawal if they run out of medications. The patient assumes all responsibility when there is an issue with non-compliance with follow-up appointments and medications.  Some medications are controlled and regulated by the FDA and [place]. Some of these medications can not be refilled before 30 days and require a face to face appointment.     PAPERWORK REQUESTS: If you have any forms or letters that need to be completed by your doctor, please present these at the beginning of the appointment to ensure that information needed to complete them is obtained during the office visit. Paperwork will be returned within 7-10 business days. Staff will call you to  the paperwork when completed.    SPECIAL EVALUATIONS: Please note that our  "department is treatment-focused. As such, we focus on treatment-oriented evaluations and do not perform specialty or "forensic" evaluations. Examples are listed below.    Disability: We do not do disability evaluations.  Please contact Social Security Administration for evaluations and determinations. You will then sign releases allowing for records from your treatment providers to be forwarded to Social Security Administration to use in their evaluation.  Gun Permit: We do not offer Sound Judgment Evaluations or assessments leading to gun ownership, nor do we fill out or file paperwork relevant to owning, concealing or purchasing a firearm.  Emotional Support     Animals (ROSAMARIA): We do not provide documentation, including letters, to aid in the acclamation that an Emotional Support Animal is required. Note that ESAs are not trained to perform tasks or recognize particular signs or symptoms. Rather, they are distinguished by the close, emotional, and supportive bond between the animal and the owner.       SAMPLES: We do not provide samples of any medications. If you have financial difficulties and are on a limited income, you may qualify for Patient Assistance Programs from various pharmaceutical companies. This will require that you complete paperwork with your financial information, but this does not guarantee that the company will approve the application. Alternative medication options can be discussed.    REFERRALS/COORDINATION: You will be referred to other providers if we feel unable to adequately diagnose or treat your particular condition, or if collaboration with another provider would allow for better management of your condition.       Call In if problems  Call Report Side Effects   Encouraged to follow up with primary care / Gen Med MD for continued monitoring of general health and wellness  Call 911 Or go to ER if Acute Concerns (especially if any thoughts of harm to self or other)  Discussed the " following issues related to psychiatric treatment in pregnancy   -studies are limited, inadequate or non-existent   -conflicting but some large studies suggest that antidepressants including non-SSRIs shorten pregnancy in a dose dependant manner   -SRI medications in the second trimester likely increase the risk of PPHTN and are associated w/  irritabiiltiy  -failure to treat depression in pregnancy has risks for patient,  and existing family. The decision to use medication is individual to each patient.             Tiffanie Galvez, PhD, MP  Advanced Practice Medical Psychologist  [facility]  [id number] [facility].  [place] [id number]  [phone]   [phone] fax
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
## 2022-11-17 NOTE — Clinical Note
Thank you so much for the referral.  Please see attached.  I am looking forward to following her along with you.

## 2022-12-05 ENCOUNTER — PROCEDURE VISIT (OUTPATIENT)
Dept: NEUROLOGY | Facility: CLINIC | Age: 27
End: 2022-12-05
Payer: COMMERCIAL

## 2022-12-05 VITALS
SYSTOLIC BLOOD PRESSURE: 112 MMHG | DIASTOLIC BLOOD PRESSURE: 64 MMHG | RESPIRATION RATE: 17 BRPM | HEART RATE: 77 BPM | HEIGHT: 60 IN | BODY MASS INDEX: 21.99 KG/M2 | WEIGHT: 112 LBS

## 2022-12-05 DIAGNOSIS — G43.719 INTRACTABLE CHRONIC MIGRAINE WITHOUT AURA AND WITHOUT STATUS MIGRAINOSUS: Primary | ICD-10-CM

## 2022-12-05 PROCEDURE — 64615 CHEMODENERV MUSC MIGRAINE: CPT | Mod: S$GLB,,, | Performed by: NURSE PRACTITIONER

## 2022-12-05 PROCEDURE — 64615 PR CHEMODENERVATION OF MUSCLE FOR CHRONIC MIGRAINE: ICD-10-PCS | Mod: S$GLB,,, | Performed by: NURSE PRACTITIONER

## 2022-12-05 RX ORDER — RIMEGEPANT SULFATE 75 MG/75MG
75 TABLET, ORALLY DISINTEGRATING ORAL DAILY PRN
Qty: 8 TABLET | Refills: 11 | Status: SHIPPED | OUTPATIENT
Start: 2022-12-05 | End: 2023-01-24

## 2022-12-05 NOTE — PROCEDURES
Procedures     BOTOX PROCEDURE    PROCEDURE PERFORMED: Botulinum toxin injection (07891)    CLINICAL INDICATION: G43.719    Cycle #4    A time out was conducted just before the start of the procedure to verify the correct patient and procedure, procedure location, and all relevant critical information.   Signed consent obtained prior to procedure     Conventional methods of treatment but the patient has been unresponsive and refractory.The patient meets criteria for chronic headaches according to the ICHD-III, the patient has more than 15 headaches a month at least 8 of them meet migraine criteria, which last for more than 4 hours a day.     Last Botox injections were on 6/20/22  and  there has been over 50%  improvement in the patient's symptoms. Frequency of treatment is every 12 weeks unless no response to the treatments, at which time we will discontinue the injections.     DESCRIPTION OF PROCEDURE: After obtaining informed consent and under  aseptic technique, a total of 155 units of botulinum toxin type A were   injected in the following muscles: Procerus 5 units,  5 units  bilaterally, frontalis 20 units, temporalis 20 units bilaterally,  occipitalis 15 units, upper cervical paraspinals 10 units bilaterally and trapezius 15 units bilaterally. The patient was given a total of 155 units in 31 sites.    The patient tolerated the procedure well. There were no complications. The patient was given a prescription for repeat treatment in 12 weeks.     Unavoidable waste 45 units    RTC in 6 weeks.   SERA Goodman

## 2023-01-06 ENCOUNTER — PATIENT MESSAGE (OUTPATIENT)
Dept: PSYCHIATRY | Facility: CLINIC | Age: 28
End: 2023-01-06
Payer: COMMERCIAL

## 2023-01-09 ENCOUNTER — PATIENT MESSAGE (OUTPATIENT)
Dept: PRIMARY CARE CLINIC | Facility: CLINIC | Age: 28
End: 2023-01-09
Payer: COMMERCIAL

## 2023-01-17 ENCOUNTER — OFFICE VISIT (OUTPATIENT)
Dept: OBSTETRICS AND GYNECOLOGY | Facility: CLINIC | Age: 28
End: 2023-01-17
Payer: COMMERCIAL

## 2023-01-17 ENCOUNTER — LAB VISIT (OUTPATIENT)
Dept: LAB | Facility: HOSPITAL | Age: 28
End: 2023-01-17
Attending: ADVANCED PRACTICE MIDWIFE
Payer: COMMERCIAL

## 2023-01-17 VITALS
SYSTOLIC BLOOD PRESSURE: 142 MMHG | BODY MASS INDEX: 22.42 KG/M2 | DIASTOLIC BLOOD PRESSURE: 82 MMHG | WEIGHT: 114.19 LBS | HEIGHT: 60 IN

## 2023-01-17 DIAGNOSIS — Z32.01 POSITIVE PREGNANCY TEST: ICD-10-CM

## 2023-01-17 DIAGNOSIS — Z32.00 POSSIBLE PREGNANCY: Primary | ICD-10-CM

## 2023-01-17 LAB
B-HCG UR QL: POSITIVE
CTP QC/QA: YES

## 2023-01-17 PROCEDURE — 99213 PR OFFICE/OUTPT VISIT, EST, LEVL III, 20-29 MIN: ICD-10-PCS | Mod: S$GLB,,, | Performed by: ADVANCED PRACTICE MIDWIFE

## 2023-01-17 PROCEDURE — 99999 PR PBB SHADOW E&M-EST. PATIENT-LVL III: ICD-10-PCS | Mod: PBBFAC,,, | Performed by: ADVANCED PRACTICE MIDWIFE

## 2023-01-17 PROCEDURE — 86803 HEPATITIS C AB TEST: CPT | Performed by: ADVANCED PRACTICE MIDWIFE

## 2023-01-17 PROCEDURE — 86592 SYPHILIS TEST NON-TREP QUAL: CPT | Performed by: ADVANCED PRACTICE MIDWIFE

## 2023-01-17 PROCEDURE — 81025 URINE PREGNANCY TEST: CPT | Mod: S$GLB,,, | Performed by: ADVANCED PRACTICE MIDWIFE

## 2023-01-17 PROCEDURE — 86762 RUBELLA ANTIBODY: CPT | Performed by: ADVANCED PRACTICE MIDWIFE

## 2023-01-17 PROCEDURE — 87086 URINE CULTURE/COLONY COUNT: CPT | Performed by: ADVANCED PRACTICE MIDWIFE

## 2023-01-17 PROCEDURE — 3077F PR MOST RECENT SYSTOLIC BLOOD PRESSURE >= 140 MM HG: ICD-10-PCS | Mod: CPTII,S$GLB,, | Performed by: ADVANCED PRACTICE MIDWIFE

## 2023-01-17 PROCEDURE — 87340 HEPATITIS B SURFACE AG IA: CPT | Performed by: ADVANCED PRACTICE MIDWIFE

## 2023-01-17 PROCEDURE — 3079F PR MOST RECENT DIASTOLIC BLOOD PRESSURE 80-89 MM HG: ICD-10-PCS | Mod: CPTII,S$GLB,, | Performed by: ADVANCED PRACTICE MIDWIFE

## 2023-01-17 PROCEDURE — 87591 N.GONORRHOEAE DNA AMP PROB: CPT | Performed by: ADVANCED PRACTICE MIDWIFE

## 2023-01-17 PROCEDURE — 99213 OFFICE O/P EST LOW 20 MIN: CPT | Mod: S$GLB,,, | Performed by: ADVANCED PRACTICE MIDWIFE

## 2023-01-17 PROCEDURE — 81025 PR  URINE PREGNANCY TEST: ICD-10-PCS | Mod: S$GLB,,, | Performed by: ADVANCED PRACTICE MIDWIFE

## 2023-01-17 PROCEDURE — 3008F PR BODY MASS INDEX (BMI) DOCUMENTED: ICD-10-PCS | Mod: CPTII,S$GLB,, | Performed by: ADVANCED PRACTICE MIDWIFE

## 2023-01-17 PROCEDURE — 3077F SYST BP >= 140 MM HG: CPT | Mod: CPTII,S$GLB,, | Performed by: ADVANCED PRACTICE MIDWIFE

## 2023-01-17 PROCEDURE — 3079F DIAST BP 80-89 MM HG: CPT | Mod: CPTII,S$GLB,, | Performed by: ADVANCED PRACTICE MIDWIFE

## 2023-01-17 PROCEDURE — 1159F PR MEDICATION LIST DOCUMENTED IN MEDICAL RECORD: ICD-10-PCS | Mod: CPTII,S$GLB,, | Performed by: ADVANCED PRACTICE MIDWIFE

## 2023-01-17 PROCEDURE — 86900 BLOOD TYPING SEROLOGIC ABO: CPT | Performed by: ADVANCED PRACTICE MIDWIFE

## 2023-01-17 PROCEDURE — 85025 COMPLETE CBC W/AUTO DIFF WBC: CPT | Performed by: ADVANCED PRACTICE MIDWIFE

## 2023-01-17 PROCEDURE — 3008F BODY MASS INDEX DOCD: CPT | Mod: CPTII,S$GLB,, | Performed by: ADVANCED PRACTICE MIDWIFE

## 2023-01-17 PROCEDURE — 1159F MED LIST DOCD IN RCRD: CPT | Mod: CPTII,S$GLB,, | Performed by: ADVANCED PRACTICE MIDWIFE

## 2023-01-17 PROCEDURE — 87389 HIV-1 AG W/HIV-1&-2 AB AG IA: CPT | Performed by: ADVANCED PRACTICE MIDWIFE

## 2023-01-17 PROCEDURE — 99999 PR PBB SHADOW E&M-EST. PATIENT-LVL III: CPT | Mod: PBBFAC,,, | Performed by: ADVANCED PRACTICE MIDWIFE

## 2023-01-17 PROCEDURE — 87624 HPV HI-RISK TYP POOLED RSLT: CPT | Performed by: ADVANCED PRACTICE MIDWIFE

## 2023-01-17 PROCEDURE — 36415 COLL VENOUS BLD VENIPUNCTURE: CPT | Mod: PO | Performed by: ADVANCED PRACTICE MIDWIFE

## 2023-01-17 PROCEDURE — 88175 CYTOPATH C/V AUTO FLUID REDO: CPT | Performed by: ADVANCED PRACTICE MIDWIFE

## 2023-01-17 RX ORDER — HYDROCODONE BITARTRATE AND ACETAMINOPHEN 7.5; 325 MG/1; MG/1
1 TABLET ORAL
COMMUNITY
Start: 2022-10-26 | End: 2023-01-17

## 2023-01-17 RX ORDER — TRIAZOLAM 0.25 MG/1
0.25 TABLET ORAL
COMMUNITY
Start: 2022-12-28 | End: 2023-01-17

## 2023-01-17 RX ORDER — IBUPROFEN 800 MG/1
800 TABLET ORAL EVERY 8 HOURS
COMMUNITY
Start: 2022-10-26 | End: 2023-01-24

## 2023-01-17 RX ORDER — B-COMPLEX WITH VITAMIN C
1 TABLET ORAL
COMMUNITY
Start: 2022-10-24 | End: 2023-08-15

## 2023-01-17 NOTE — PROGRESS NOTES
CHIEF COMPLAINT:   Patient presents with      Possible Pregnancy        HISTORY OF PRESENT ILLNESS  Iris Johnson 27 y.o.  presents for pregnancy risk assessment.   The patient has no complaints today.  No nausea or vomiting. No bleeding or pain.  Pregnancy was not  planned but is desired.  Partner is supportive of pregnancy.  Lives at home with .  No pets at home Cat litter precautions.  Works at construction .  Denies domestic abuse.  Denies chemical/pesticide/radiation exposure.  OB history:  x1       LMP: Patient's last menstrual period was 2022.  EDC: Estimated Date of Delivery: 23  EGA: 6w5d      Health Maintenance   Topic Date Due    Hepatitis C Screening  Never done    Lipid Panel  Never done    TETANUS VACCINE  Never done    Pap Smear  Never done       Past Medical History:   Diagnosis Date    ADHD (attention deficit hyperactivity disorder)     Anemia     Bipolar disorder     Chicken pox     childhood    Headache     Heart palpitations     Hypotension     Insomnia     Migraine     Postpartum depression     Substance abuse     prior use, no current use    Syncope 2009    admitted x 1    Syncope        Past Surgical History:   Procedure Laterality Date    dental extraction ()      RHINOPLASTY  2010    TONSILLECTOMY         Family History   Problem Relation Age of Onset    Hyperlipidemia Paternal Grandfather     Heart disease Paternal Grandfather     Hyperlipidemia Paternal Grandmother     Breast cancer Paternal Grandmother 55    Asthma Paternal Grandmother     Depression Paternal Grandmother     Hyperlipidemia Father     Breast cancer Mother     Skin cancer Mother     Bipolar disorder Mother     Cervical cancer Mother     Ovarian cancer Neg Hx     Colon cancer Neg Hx        Social History     Socioeconomic History    Marital status:     Number of children: 1   Occupational History    Occupation: construction     Comment: history of waitressing   Tobacco Use     Smoking status: Every Day     Types: Vaping w/o nicotine    Smokeless tobacco: Never   Substance and Sexual Activity    Alcohol use: Not Currently     Comment: 1-2 drinks lper week    Drug use: Not Currently     Types: Amphetamines, Barbituates, Benzodiazepines, Cocaine, Heroin, Marijuana     Comment: narcotics, ecstacy (all prior use)    Sexual activity: Yes     Partners: Male     Birth control/protection: None     Comment: birth control pill       Current Outpatient Medications   Medication Sig Dispense Refill    QUEtiapine (SEROQUEL) 300 MG Tab Take 1 tablet (300 mg total) by mouth every evening. 90 tablet 3    dextroamphetamine-amphetamine (ADDERALL) 30 mg Tab Take 1 tablet (30 mg total) by mouth 2 (two) times a day. (Patient not taking: Reported on 1/17/2023) 60 tablet 0    [START ON 1/30/2023] dextroamphetamine-amphetamine (ADDERALL) 30 mg Tab Take 1 tablet (30 mg total) by mouth 2 (two) times a day. 60 tablet 0    ibuprofen (ADVIL,MOTRIN) 800 MG tablet Take 800 mg by mouth every 8 (eight) hours.      ondansetron (ZOFRAN) 4 MG tablet Take 1 tablet (4 mg total) by mouth every 8 (eight) hours as needed for Nausea. (Patient not taking: Reported on 1/17/2023) 15 tablet 6    PRENATAL VITAMIN 27 mg iron- 0.8 mg Tab Take 1 tablet by mouth.      rimegepant (NURTEC) 75 mg odt Take 1 tablet (75 mg total) by mouth daily as needed. Place ODT tablet on the tongue; alternatively the ODT tablet may be placed under the tongue (Patient not taking: Reported on 1/17/2023) 8 tablet 11     Current Facility-Administered Medications   Medication Dose Route Frequency Provider Last Rate Last Admin    onabotulinumtoxina injection 200 Units  200 Units Intramuscular q12 weeks Deb Aguirre NP   200 Units at 12/05/22 1514       Review of patient's allergies indicates:   Allergen Reactions    Bactrim [sulfamethoxazole-trimethoprim]     Sulfa (sulfonamide antibiotics)          PHYSICAL EXAM   Vitals:    01/17/23 1049   BP: (!) 142/82         PAIN SCALE: 0/10 None    PHYSICAL EXAM    ROS:  GENERAL: No fever, chills, fatigability or weight loss.  CV: Denies chest pain  PULM: Denies shortness of breath or wheezing.  ABDOMEN: Appetite fine. No weight loss. Denies diarrhea, abdominal pain, hematemesis or blood in stool.  URINARY: No flank pain, dysuria or hematuria.  REPRODUCTIVE: No abnormal vaginal bleeding.       PE:   APPEARANCE: Well nourished, well developed, in no acute distress  CHEST: Clear to auscultation bilaterally  CV: Regular rate and rhythm  BREASTS: Symmetrical, no skin changes or visible lesions. No palpable masses, nipple discharge or adenopathy bilaterally.  ABDOMEN: Soft. No tenderness or masses. No hepatosplenomegaly. No hernias  PELVIC:   VULVA: No lesions. Normal female genitalia.  URETHRAL MEATUS: Normal size and location, no lesions, no prolapse.  URETHRA: No masses, tenderness, prolapse or scarring.  VAGINA: Moist and well rugated, no discharge, no significant cystocele or rectocele.  CERVIX: No lesions, normal diameter, no stenosis, no cervical motion tenderness.   UTERUS: 7size, regular shape, mobile, non-tender, normal position, good support.  ADNEXA: No masses, tenderness or CDS nodularity.  ANUS PERINEUM: No lesions, no relaxation, no external hemorrhoids.     UPT +    A/P:     -      Patient was counseled today on A.C.S. Pap guidelines and recommendations for yearly pelvic exams, mammograms and monthly self breast exams; to see her PCP for other health maintenance and pregnancy.   -      Patient's medications and medical history reviewed with patient and implications in pregnancy.   -      Pregnancy course discussed and 'AtoZ' book given. Patient was counseled on proper weight gain based on the [place] [employer]'s recommendations based on her pre-pregnancy weight. Discussed foods to avoid in pregnancy (i.e. sushi, fish that are high in mercury, deli meat, and unpasteurized cheeses). Discussed prenatal vitamin  options (i.e. stool softener, DHA). Discussed potential medical problems in pregnancy.  -     Discussed risk of Toxoplasmosis transmission from pets and reviewed risk reduction techniques.  -     Discused increased risks with AMA status.    -     Chromosomal abnormality risk discussed and available testing offered - Mat 21   -     Pt was counseled on exercise in pregnancy and weight gain recommendations.  - ACOG and SMFM recommend that all eligible persons, including pregnant and lactating individuals, receive a COVID-19 vaccine or vaccine series. There is no evidence of adverse maternal or fetal effects from vaccinating pregnant individuals with COVID-19 vaccine, and a growing body of data demonstrate the safety of such use. Discussed that there is no indication to delay vaccination until after the first trimester or until the postpartum period.  Claims linking COVID-19 vaccines to infertility are unfounded and have no scientific evidence supporting them. ACOG recommends vaccination for all eligible people who may consider future pregnancy.  Expected side effects were discussed and explained that they are a normal part of the body's reaction to the vaccine and developing antibodies to protect against COVID-19 illness.  Women under age 50 including pregnant individuals can receive any FDA-authorized COVID-19 vaccine available to them. However, there is a rare risk (around 1 in 150,000) of thrombosis with thrombocytopenia syndrome (TTS) after receipt of the [facility] COVID-19 vaccine which was discussed.  COVID-19 vaccines may be administered simultaneously with other vaccines, including within 14 days of receipt of another vaccine. This includes vaccines routinely administered during pregnancy, such as influenza and Tdap.    -     Oriented to practice including CNM collaboration.   -     Follow-up initial OB, with labs and u/s.   Genprobe and pap

## 2023-01-18 LAB
ABO + RH BLD: NORMAL
BASOPHILS # BLD AUTO: 0.05 K/UL (ref 0–0.2)
BASOPHILS NFR BLD: 0.7 % (ref 0–1.9)
BLD GP AB SCN CELLS X3 SERPL QL: NORMAL
C TRACH DNA SPEC QL NAA+PROBE: NOT DETECTED
DIFFERENTIAL METHOD: ABNORMAL
EOSINOPHIL # BLD AUTO: 0 K/UL (ref 0–0.5)
EOSINOPHIL NFR BLD: 0.4 % (ref 0–8)
ERYTHROCYTE [DISTWIDTH] IN BLOOD BY AUTOMATED COUNT: 12.3 % (ref 11.5–14.5)
HBV SURFACE AG SERPL QL IA: NORMAL
HCT VFR BLD AUTO: 37.2 % (ref 37–48.5)
HCV AB SERPL QL IA: NORMAL
HGB BLD-MCNC: 12.1 G/DL (ref 12–16)
HIV 1+2 AB+HIV1 P24 AG SERPL QL IA: NORMAL
IMM GRANULOCYTES # BLD AUTO: 0.01 K/UL (ref 0–0.04)
IMM GRANULOCYTES NFR BLD AUTO: 0.1 % (ref 0–0.5)
LYMPHOCYTES # BLD AUTO: 3 K/UL (ref 1–4.8)
LYMPHOCYTES NFR BLD: 43.6 % (ref 18–48)
MCH RBC QN AUTO: 30.9 PG (ref 27–31)
MCHC RBC AUTO-ENTMCNC: 32.5 G/DL (ref 32–36)
MCV RBC AUTO: 95 FL (ref 82–98)
MONOCYTES # BLD AUTO: 0.5 K/UL (ref 0.3–1)
MONOCYTES NFR BLD: 7.4 % (ref 4–15)
N GONORRHOEA DNA SPEC QL NAA+PROBE: NOT DETECTED
NEUTROPHILS # BLD AUTO: 3.2 K/UL (ref 1.8–7.7)
NEUTROPHILS NFR BLD: 47.8 % (ref 38–73)
NRBC BLD-RTO: 0 /100 WBC
PLATELET # BLD AUTO: 265 K/UL (ref 150–450)
PMV BLD AUTO: 10.4 FL (ref 9.2–12.9)
RBC # BLD AUTO: 3.92 M/UL (ref 4–5.4)
RPR SER QL: NORMAL
RUBV IGG SER-ACNC: 59.6 IU/ML
RUBV IGG SER-IMP: REACTIVE
WBC # BLD AUTO: 6.79 K/UL (ref 3.9–12.7)

## 2023-01-19 ENCOUNTER — OFFICE VISIT (OUTPATIENT)
Dept: PRIMARY CARE CLINIC | Facility: CLINIC | Age: 28
End: 2023-01-19
Payer: COMMERCIAL

## 2023-01-19 VITALS
RESPIRATION RATE: 16 BRPM | SYSTOLIC BLOOD PRESSURE: 118 MMHG | BODY MASS INDEX: 22.21 KG/M2 | DIASTOLIC BLOOD PRESSURE: 72 MMHG | TEMPERATURE: 98 F | HEIGHT: 60 IN | HEART RATE: 83 BPM | OXYGEN SATURATION: 97 % | WEIGHT: 113.13 LBS

## 2023-01-19 DIAGNOSIS — H66.93 BILATERAL OTITIS MEDIA, UNSPECIFIED OTITIS MEDIA TYPE: Primary | ICD-10-CM

## 2023-01-19 LAB — BACTERIA UR CULT: NO GROWTH

## 2023-01-19 PROCEDURE — 3008F BODY MASS INDEX DOCD: CPT | Mod: CPTII,S$GLB,, | Performed by: FAMILY MEDICINE

## 2023-01-19 PROCEDURE — 3078F PR MOST RECENT DIASTOLIC BLOOD PRESSURE < 80 MM HG: ICD-10-PCS | Mod: CPTII,S$GLB,, | Performed by: FAMILY MEDICINE

## 2023-01-19 PROCEDURE — 3078F DIAST BP <80 MM HG: CPT | Mod: CPTII,S$GLB,, | Performed by: FAMILY MEDICINE

## 2023-01-19 PROCEDURE — 3074F SYST BP LT 130 MM HG: CPT | Mod: CPTII,S$GLB,, | Performed by: FAMILY MEDICINE

## 2023-01-19 PROCEDURE — 99999 PR PBB SHADOW E&M-EST. PATIENT-LVL IV: CPT | Mod: PBBFAC,,, | Performed by: FAMILY MEDICINE

## 2023-01-19 PROCEDURE — 3074F PR MOST RECENT SYSTOLIC BLOOD PRESSURE < 130 MM HG: ICD-10-PCS | Mod: CPTII,S$GLB,, | Performed by: FAMILY MEDICINE

## 2023-01-19 PROCEDURE — 99999 PR PBB SHADOW E&M-EST. PATIENT-LVL IV: ICD-10-PCS | Mod: PBBFAC,,, | Performed by: FAMILY MEDICINE

## 2023-01-19 PROCEDURE — 99213 PR OFFICE/OUTPT VISIT, EST, LEVL III, 20-29 MIN: ICD-10-PCS | Mod: S$GLB,,, | Performed by: FAMILY MEDICINE

## 2023-01-19 PROCEDURE — 1159F PR MEDICATION LIST DOCUMENTED IN MEDICAL RECORD: ICD-10-PCS | Mod: CPTII,S$GLB,, | Performed by: FAMILY MEDICINE

## 2023-01-19 PROCEDURE — 3008F PR BODY MASS INDEX (BMI) DOCUMENTED: ICD-10-PCS | Mod: CPTII,S$GLB,, | Performed by: FAMILY MEDICINE

## 2023-01-19 PROCEDURE — 99213 OFFICE O/P EST LOW 20 MIN: CPT | Mod: S$GLB,,, | Performed by: FAMILY MEDICINE

## 2023-01-19 PROCEDURE — 1159F MED LIST DOCD IN RCRD: CPT | Mod: CPTII,S$GLB,, | Performed by: FAMILY MEDICINE

## 2023-01-19 RX ORDER — AMOXICILLIN 500 MG/1
500 TABLET, FILM COATED ORAL EVERY 12 HOURS
Qty: 20 TABLET | Refills: 0 | Status: SHIPPED | OUTPATIENT
Start: 2023-01-19 | End: 2023-02-14

## 2023-01-19 NOTE — PROGRESS NOTES
[facility]  Otorhinolaryngology-Head & Neck Surgery  History & Physical    Patient Name: Iris Johnson  MRN: [id number]  Admission Date: (Not on file)  Attending Physician: Laura Torrez MD  Primary Care Provider: Gurinder Perez MD    Patient information was obtained from patient and ER records.     Subjective:         History of Present Illness:  27 y.o. female presents with ear pain; Patient complains of ear pain and possible ear infection. Symptoms include plugged sensation in both ears. Onset of symptoms was 8 days ago, and have been gradually worsening since that time. Associated symptoms include: post nasal drip. Patient denies: fever , headache, sinus pressure, and sore throat. She is drinking plenty of fluids. Has hx of nasal surgery years ago. Usually responds to abx. Patient is incidentally 7 weeks pregnant.        Current Outpatient Medications on File Prior to Visit   Medication Sig    ondansetron (ZOFRAN) 4 MG tablet Take 1 tablet (4 mg total) by mouth every 8 (eight) hours as needed for Nausea.    PRENATAL VITAMIN 27 mg iron- 0.8 mg Tab Take 1 tablet by mouth.    QUEtiapine (SEROQUEL) 300 MG Tab Take 1 tablet (300 mg total) by mouth every evening.    rimegepant (NURTEC) 75 mg odt Take 1 tablet (75 mg total) by mouth daily as needed. Place ODT tablet on the tongue; alternatively the ODT tablet may be placed under the tongue    dextroamphetamine-amphetamine (ADDERALL) 30 mg Tab Take 1 tablet (30 mg total) by mouth 2 (two) times a day. (Patient not taking: Reported on 1/19/2023)    [START ON 1/30/2023] dextroamphetamine-amphetamine (ADDERALL) 30 mg Tab Take 1 tablet (30 mg total) by mouth 2 (two) times a day. (Patient not taking: Reported on 1/19/2023)    ibuprofen (ADVIL,MOTRIN) 800 MG tablet Take 800 mg by mouth every 8 (eight) hours.     Current Facility-Administered Medications on File Prior to Visit   Medication    onabotulinumtoxina injection 200  Units       Review of patient's allergies indicates:   Allergen Reactions    Bactrim [sulfamethoxazole-trimethoprim]     Sulfa (sulfonamide antibiotics)        Past Medical History:   Diagnosis Date    ADHD (attention deficit hyperactivity disorder)     Anemia     Bipolar disorder     Chicken pox     childhood    Headache     Heart palpitations     Hypotension     Insomnia     Migraine     Postpartum depression     Substance abuse     prior use, no current use    Syncope 2009    admitted x 1    Syncope      Past Surgical History:   Procedure Laterality Date    dental extraction (2013)  2013    RHINOPLASTY  2010    TONSILLECTOMY       Family History       Problem Relation (Age of Onset)    Arthritis Paternal Grandmother    Asthma Paternal Grandmother    Bipolar disorder Mother    Breast cancer Paternal Grandmother (55), Mother    Cancer Paternal Grandfather, Paternal Grandmother, Mother    Cervical cancer Mother    Depression Paternal Grandmother    Heart block Paternal Grandfather    Heart disease Paternal Grandfather    Hyperlipidemia Paternal Grandfather, Paternal Grandmother, Father    Hypertension Paternal Grandfather, Paternal Grandmother    Mental illness Mother    Skin cancer Mother    Stroke Paternal Grandmother          Tobacco Use    Smoking status: Some Days     Types: Vaping with nicotine    Smokeless tobacco: Never   Substance and Sexual Activity    Alcohol use: Not Currently     Comment: 1-2 drinks lper week    Drug use: Not Currently     Types: Amphetamines, Marijuana     Comment: narcotics, ecstacy (all prior use)    Sexual activity: Yes     Partners: Male     Birth control/protection: None     Comment: birth control pill     Review of Systems   HENT:  Positive for ear pain.    Objective:     Vital Signs (Most Recent):  Temp: 98 °F (36.7 °C) (01/19/23 1548)  Pulse: 83 (01/19/23 1548)  Resp: 16 (01/19/23 1548)  BP: 118/72 (01/19/23 1548)  SpO2: 97 % (01/19/23 1548) Vital Signs  (24h Range):  [unfilled]     Weight: 51.3 kg (113 lb 1.5 oz)  Body mass index is 22.09 kg/m².    Physical Exam  Normal physique- appears tired- right ear normal light reflex- bulging- no erythema noted- ear tube scar noted  Left ear essentially the same with minimal wax in EAC.             Laura Torrez MD  Otorhinolaryngology-Head & Neck Surgery  [facility]

## 2023-01-24 ENCOUNTER — PROCEDURE VISIT (OUTPATIENT)
Dept: OBSTETRICS AND GYNECOLOGY | Facility: CLINIC | Age: 28
End: 2023-01-24
Payer: COMMERCIAL

## 2023-01-24 ENCOUNTER — TELEPHONE (OUTPATIENT)
Dept: OBSTETRICS AND GYNECOLOGY | Facility: CLINIC | Age: 28
End: 2023-01-24

## 2023-01-24 ENCOUNTER — INITIAL PRENATAL (OUTPATIENT)
Dept: OBSTETRICS AND GYNECOLOGY | Facility: CLINIC | Age: 28
End: 2023-01-24
Payer: COMMERCIAL

## 2023-01-24 VITALS — DIASTOLIC BLOOD PRESSURE: 65 MMHG | WEIGHT: 114 LBS | SYSTOLIC BLOOD PRESSURE: 110 MMHG | BODY MASS INDEX: 22.26 KG/M2

## 2023-01-24 DIAGNOSIS — Z3A.01 6 WEEKS GESTATION OF PREGNANCY: Primary | ICD-10-CM

## 2023-01-24 DIAGNOSIS — Z34.91 NORMAL PREGNANCY IN FIRST TRIMESTER: ICD-10-CM

## 2023-01-24 DIAGNOSIS — Z32.01 POSITIVE PREGNANCY TEST: ICD-10-CM

## 2023-01-24 PROCEDURE — 99999 PR PBB SHADOW E&M-EST. PATIENT-LVL III: CPT | Mod: PBBFAC,,, | Performed by: ADVANCED PRACTICE MIDWIFE

## 2023-01-24 PROCEDURE — 76817 TRANSVAGINAL US OBSTETRIC: CPT | Mod: S$GLB,,, | Performed by: OBSTETRICS & GYNECOLOGY

## 2023-01-24 PROCEDURE — 0502F PR SUBSEQUENT PRENATAL CARE: ICD-10-PCS | Mod: CPTII,S$GLB,, | Performed by: ADVANCED PRACTICE MIDWIFE

## 2023-01-24 PROCEDURE — 0502F SUBSEQUENT PRENATAL CARE: CPT | Mod: CPTII,S$GLB,, | Performed by: ADVANCED PRACTICE MIDWIFE

## 2023-01-24 PROCEDURE — 99999 PR PBB SHADOW E&M-EST. PATIENT-LVL III: ICD-10-PCS | Mod: PBBFAC,,, | Performed by: ADVANCED PRACTICE MIDWIFE

## 2023-01-24 PROCEDURE — 76817 US OB/GYN PROCEDURE (VIEWPOINT): ICD-10-PCS | Mod: S$GLB,,, | Performed by: OBSTETRICS & GYNECOLOGY

## 2023-01-28 LAB
CLINICAL INFO: ABNORMAL
CYTO CVX: ABNORMAL
CYTOLOGIST CVX/VAG CYTO: ABNORMAL
CYTOLOGIST CVX/VAG CYTO: ABNORMAL
CYTOLOGY CMNT CVX/VAG CYTO-IMP: ABNORMAL
CYTOLOGY PAP THIN PREP EXPLANATION: ABNORMAL
DATE OF PREVIOUS PAP: ABNORMAL
DATE PREVIOUS BX: NO
GEN CATEG CVX/VAG CYTO-IMP: ABNORMAL
HPV I/H RISK 4 DNA CVX QL NAA+PROBE: NOT DETECTED
LMP START DATE: ABNORMAL
MICROORGANISM CVX/VAG CYTO: ABNORMAL
PATHOLOGIST CVX/VAG CYTO: ABNORMAL
SERVICE CMNT-IMP: ABNORMAL
SPECIMEN SOURCE CVX/VAG CYTO: ABNORMAL
STAT OF ADQ CVX/VAG CYTO-IMP: ABNORMAL

## 2023-01-30 ENCOUNTER — PATIENT MESSAGE (OUTPATIENT)
Dept: NEUROLOGY | Facility: CLINIC | Age: 28
End: 2023-01-30
Payer: COMMERCIAL

## 2023-01-30 ENCOUNTER — TELEPHONE (OUTPATIENT)
Dept: NEUROLOGY | Facility: CLINIC | Age: 28
End: 2023-01-30
Payer: COMMERCIAL

## 2023-01-30 ENCOUNTER — PATIENT MESSAGE (OUTPATIENT)
Dept: OBSTETRICS AND GYNECOLOGY | Facility: CLINIC | Age: 28
End: 2023-01-30
Payer: COMMERCIAL

## 2023-01-30 NOTE — TELEPHONE ENCOUNTER
Called pt to check on her current status, unable to reach the pt, left message to call back to discuss scheduling appt.

## 2023-01-30 NOTE — TELEPHONE ENCOUNTER
----- Message from Zoey Fields sent at 1/30/2023 12:19 PM CST -----  Contact: pt spouse  Type:  Patient Returning Call    Who Called:opt  Jose Maria  Who Left Message for Patient:valerie  Does the patient know what this is regarding?:he called the office  Would the patient rather a call back or a response via [facility]? call  Best Call Back Number:[phone]     Additional Information: please advise that you.

## 2023-01-30 NOTE — TELEPHONE ENCOUNTER
Called both the patient and the patient's . Patient's  is 90 minutes away at work. Spoke with patient and she is currently in bed and very upset with her current plan of care. Discussed we will continue with Botox as long as OB aware and no issue and patient accepts possible risks. I asked patient on two separate occasions if she is safe and she stated yes both times. She is upset with recent medication changes. She states she is currently 8 weeks pregnant and I agree we can repeat Botox in the second trimester. We can schedule that the first or second week of March. I will reach out to her OB provider to update

## 2023-01-30 NOTE — TELEPHONE ENCOUNTER
Returned call, no answer, left voice mail message.  Advised if patient's Manic state requires Emergency treatment to take her to the ER.  We will need to schedule an  appointment to discuss treatments for headache/migraine once patient has been treated for Manic state.  Please call  to make this appointment with Deb Aguirre NP

## 2023-01-30 NOTE — TELEPHONE ENCOUNTER
----- Message from Edith Carr Patient Care Assistant sent at 1/30/2023 11:39 AM CST -----  Contact: Pt   Type: Needs Medical Advice    Who Called: Pt   Best Call Back Number: [phone]  Inquiry/Question:  is calling to speak with the nurse in regard to the status of his wife. Pt is very manic. Please advise. Thank you~

## 2023-01-30 NOTE — TELEPHONE ENCOUNTER
----- Message from Edith Carr Patient Care Assistant sent at 1/30/2023 11:39 AM CST -----  Contact: Pt   Type: Needs Medical Advice    Who Called: Pt   Best Call Back Number: [phone]  Inquiry/Question:  is calling to speak with the nurse in regard to the status of his wife. Pt is very manic. Please advise. Thank you~

## 2023-01-30 NOTE — TELEPHONE ENCOUNTER
Please continue to try to get in touch with patient. I did not stop her medications and it sounds like she is in a psych emergency and needs immediate help. We can do Botox during pregnancy as long as OB is aware and patient is aware of risks. We do not do it in the first trimester typically.

## 2023-01-31 ENCOUNTER — PATIENT MESSAGE (OUTPATIENT)
Dept: NEUROLOGY | Facility: CLINIC | Age: 28
End: 2023-01-31
Payer: COMMERCIAL

## 2023-02-01 ENCOUNTER — PATIENT MESSAGE (OUTPATIENT)
Dept: PRIMARY CARE CLINIC | Facility: CLINIC | Age: 28
End: 2023-02-01

## 2023-02-01 ENCOUNTER — OFFICE VISIT (OUTPATIENT)
Dept: PRIMARY CARE CLINIC | Facility: CLINIC | Age: 28
End: 2023-02-01
Payer: COMMERCIAL

## 2023-02-01 DIAGNOSIS — Z86.69 H/O MIGRAINE: Primary | ICD-10-CM

## 2023-02-01 PROBLEM — R87.610 ASCUS OF CERVIX WITH NEGATIVE HIGH RISK HPV: Status: ACTIVE | Noted: 2023-02-01

## 2023-02-01 PROCEDURE — 99215 OFFICE O/P EST HI 40 MIN: CPT | Mod: 95,,, | Performed by: FAMILY MEDICINE

## 2023-02-01 PROCEDURE — 99215 PR OFFICE/OUTPT VISIT, EST, LEVL V, 40-54 MIN: ICD-10-PCS | Mod: 95,,, | Performed by: FAMILY MEDICINE

## 2023-02-01 RX ORDER — BUTALBITAL, ACETAMINOPHEN AND CAFFEINE 50; 325; 40 MG/1; MG/1; MG/1
1 TABLET ORAL EVERY 6 HOURS PRN
Qty: 30 TABLET | Refills: 0 | Status: SHIPPED | OUTPATIENT
Start: 2023-02-01 | End: 2023-07-20

## 2023-02-01 NOTE — PATIENT INSTRUCTIONS
Was able to speak with Ms. Aguirre and Ms. Cowart.      Ms. Aguirre says that during pregnancy, you guys should continue the Botox injections.    They both say that you should reach for Tylenol 1st when you have a migraine.  If no improvement, it is okay to take Fioricet occasionally.  I am sending a prescription for the Fioricet to the pharmacy today.    I was able to schedule you an appointment with [facility] in [place] on March 15.  You should plan to keep the appointment in [place] on March 6, because Ms. Aguirre will be doing a Botox injection that day.    I also sent a referral to the [facility] to see if you could get in with them sooner.  Feel free to call them at [phone] to see about scheduling an appointment.    Let me know what they say.  If they cannot see you before March, we can always try [facility].

## 2023-02-01 NOTE — PROGRESS NOTES
[facility]       [address] [place] [address]      Phone: [phone]      Fax: [phone]    Gurinder Perez MD                Video Visit  02/01/2023        Subjective      27-year-old female presents today via video visit for checkup, med refills.    Patient states she is doing okay today.  A bit stressed, had argument with  this morning.  States she had a manic episode yesterday.  Has been having lots of headaches recently.    Since last visit, she has got pregnant.  Currently eight weeks.  Following with Ms. Cowart, the midline.  Really wants to have a vaginal delivery, but prefers water birth.  At her last pregnancy, she was told she could not do that at [facility].  She is hoping the midwife will allow her to do this.    As above, has been having lots of headaches lately.  Was on migraine medicine prior to pregnancy.  Needs a new medication that will be safe for her to take in pregnancy.  Does see neurology regularly.  Currently seeing miss Deb GONZALES KACIE Aguirre, in [place].  Would prefer to have a neurologist closer so she does not have to travel as far.  They are currently doing Botox to see if that helps her migraines.    Has ADHD.  Was taking Adderall 30 mg, twice a day.  Discontinued it when she got pregnant.    Also has bipolar 1 disorder.  Was taking Lamictal, Seroquel for this.  Decided to stop taking the Lamictal prior to pregnancy.  Was doing okay without it.  Was also taking Seroquel for mood, sleep.  Felt okay on it, but stop taking it when she got pregnant, as well.     Was able to get an appointment with Dr. Galvez, will be following with her regularly.      Since last visit, she also quit her job.  Will be losing her [employer] insurance from that job.  Does still have coverage through [employer] from her 's work.    PMH:  ADHD, bipolar, insomnia, depression, OCD, panic attacks, migraines.  PSH:  Nose.  Tonsils.  Walsh  teeth.  F MH:  Skin cancer.  Breast cancer.  CAD.  HTN.  Bipolar.  Alcohol abuse.  Allergies:  Sulfa drugs cause hives  Social:  Not currently working.  T:  Denies  A:  Denies  D:  THC-has medical marijuana card.  Smokes.    Exercise:  No regular exercise program.  Lots of physical activity at work.    Gyn:  Currently seeing Ms. Cowart, midwife.    Pap:  11/22/2021.  Repeat 3 years (2024)      The following were updated and reviewed by myself in the chart: medications, past medical history, past surgical history, family history, social history, and allergies.     Medications:  Current Outpatient Medications on File Prior to Visit   Medication Sig Dispense Refill    ondansetron (ZOFRAN) 4 MG tablet Take 1 tablet (4 mg total) by mouth every 8 (eight) hours as needed for Nausea. (Patient not taking: Reported on 1/24/2023) 15 tablet 6    PRENATAL VITAMIN 27 mg iron- 0.8 mg Tab Take 1 tablet by mouth.      QUEtiapine (SEROQUEL) 300 MG Tab Take 1 tablet (300 mg total) by mouth every evening. 90 tablet 3     Current Facility-Administered Medications on File Prior to Visit   Medication Dose Route Frequency Provider Last Rate Last Admin    onabotulinumtoxina injection 200 Units  200 Units Intramuscular q12 weeks Deb Aguirre, NP   200 Units at 12/05/22 1514        PMHx:  Past Medical History:   Diagnosis Date    ADHD (attention deficit hyperactivity disorder)     Anemia     ASCUS of cervix with negative high risk HPV 2/1/2023    Bipolar disorder     Chicken pox     childhood    Headache     Heart palpitations     Hypotension     Insomnia     Migraine     Postpartum depression     Substance abuse     prior use, no current use    Syncope 2009    admitted x 1    Syncope       Patient Active Problem List    Diagnosis Date Noted    ASCUS of cervix with negative high risk HPV 02/01/2023    Anxiety 08/03/2022    Attention deficit hyperactivity disorder (ADHD), predominantly inattentive type 08/03/2022    Bipolar disorder  08/03/2022    Insomnia 08/03/2022    Migraine 08/03/2022    Intractable chronic migraine without aura and without status migrainosus 12/27/2021    CTS (carpal tunnel syndrome) 12/14/2021        PSHx:  Past Surgical History:   Procedure Laterality Date    dental extraction (2013)  2013    RHINOPLASTY  2010    TONSILLECTOMY          FHx:  Family History   Problem Relation Age of Onset    Hypertension Paternal Grandfather     Hyperlipidemia Paternal Grandfather     Heart disease Paternal Grandfather     Heart block Paternal Grandfather     Cancer Paternal Grandfather     Hypertension Paternal Grandmother     Hyperlipidemia Paternal Grandmother     Breast cancer Paternal Grandmother 55    Asthma Paternal Grandmother     Depression Paternal Grandmother     Arthritis Paternal Grandmother     Cancer Paternal Grandmother     Stroke Paternal Grandmother     Hyperlipidemia Father     Breast cancer Mother     Skin cancer Mother     Bipolar disorder Mother     Cervical cancer Mother     Cancer Mother     Mental illness Mother     Ovarian cancer Neg Hx     Colon cancer Neg Hx         Social:  Social History     Socioeconomic History    Marital status:     Number of children: 1   Occupational History    Occupation: construction     Comment: history of waitressing   Tobacco Use    Smoking status: Former     Types: Vaping with nicotine    Smokeless tobacco: Never   Substance and Sexual Activity    Alcohol use: Not Currently     Comment: 1-2 drinks lper week    Drug use: Not Currently     Types: Amphetamines, Marijuana     Comment: narcotics, ecstacy (all prior use)    Sexual activity: Yes     Partners: Male     Birth control/protection: None     Comment: birth control pill        Allergies:  Review of patient's allergies indicates:   Allergen Reactions    Bactrim [sulfamethoxazole-trimethoprim]     Sulfa (sulfonamide antibiotics)         ROS:  Review of Systems   Constitutional:  Negative for activity change, appetite  change, chills and fever.   HENT:  Negative for congestion, hearing loss, postnasal drip, rhinorrhea, sore throat and trouble swallowing.    Eyes:  Negative for discharge.   Respiratory:  Negative for cough, chest tightness, shortness of breath and wheezing.    Cardiovascular:  Negative for chest pain and palpitations.   Gastrointestinal:  Negative for abdominal pain, constipation, diarrhea, nausea and vomiting.   Genitourinary:  Negative for difficulty urinating and hematuria.   Musculoskeletal:  Negative for arthralgias and myalgias.   Skin:  Negative for color change and rash.   Neurological:  Positive for headaches. Negative for speech difficulty.   Psychiatric/Behavioral:  Negative for dysphoric mood.    All other systems reviewed and are negative.       Objective      LMP  (LMP Unknown)   Ht Readings from Last 3 Encounters:   01/19/23 5' (1.524 m)   01/17/23 5' (1.524 m)   12/05/22 5' (1.524 m)     Wt Readings from Last 3 Encounters:   01/24/23 51.7 kg (113 lb 15.7 oz)   01/19/23 51.3 kg (113 lb 1.5 oz)   01/17/23 51.8 kg (114 lb 3.2 oz)       PHYSICAL EXAM:  Physical Exam  Constitutional:       General: She is not in acute distress.     Appearance: Normal appearance.   HENT:      Head: Normocephalic and atraumatic.      Nose: Nose normal. No congestion or rhinorrhea.   Pulmonary:      Effort: Pulmonary effort is normal.   Neurological:      Mental Status: She is alert.   Psychiatric:         Mood and Affect: Mood normal.         Behavior: Behavior normal.         Thought Content: Thought content normal.            LABS / IMAGING:  Recent Results (from the past 4368 hour(s))   POCT Urine Pregnancy    Collection Time: 01/17/23 10:51 AM   Result Value Ref Range    POC Preg Test, Ur Positive (A) Negative     Acceptable Yes    Urine culture    Collection Time: 01/17/23 11:59 AM    Specimen: Urine   Result Value Ref Range    Urine Culture, Routine No growth    C. trachomatis/N. gonorrhoeae by AMP DNA  [facility]; Cervix    Collection Time: 01/17/23 12:03 PM    Specimen: Genital   Result Value Ref Range    Chlamydia, Amplified DNA Not Detected Not Detected    N gonorrhoeae, amplified DNA Not Detected Not Detected   Pap Smear, Thin Prep with Reflex to HPV    Collection Time: 01/17/23 12:04 PM   Result Value Ref Range    Cytology ThinPrep Pap Source Cervix     Cytology ThinPrep Pap Report Status DNR     Cytology Thinprep PAP Clinical History Routine exam     Cytology ThinPrep Pap LMP 12/01/2022     Cytology ThinPrep Previous PAP Unknown     Cytology ThinPrep Previous Biopsy No     Cytology ThinPrep PAP Adequacy SEE BELOW     Cytology ThinPrep PAP General Categorization EPITHELIAL CELL ABNORMALITY (A)     Cytology ThinPrep PAP Interpretation SEE BELOW (A)     Cytology ThinPrep PAP Comment SEE BELOW     Cytotechnologist SEE BELOW     Review Cytotechnologist DNR     Pathologist SEE BELOW     Cytology ThinPrep PAP Infection SEE BELOW     Cytology Thin Prep Pap Explanation SEE BELOW    HPV DNA, High Risk with Reflex to Genotype 16,18    Collection Time: 01/17/23 12:04 PM   Result Value Ref Range    HPV DNA High Risk Not Detected NOT DETECTED   Hepatitis C Antibody    Collection Time: 01/17/23 12:08 PM   Result Value Ref Range    Hepatitis C Ab Non-reactive Non-reactive   HIV 1/2 Ag/Ab (4th Gen)    Collection Time: 01/17/23 12:08 PM   Result Value Ref Range    HIV 1/2 Ag/Ab Non-reactive Non-reactive   RPR    Collection Time: 01/17/23 12:08 PM   Result Value Ref Range    RPR Non-reactive Non-reactive   Hepatitis B surface antigen    Collection Time: 01/17/23 12:08 PM   Result Value Ref Range    Hepatitis B Surface Ag Non-reactive Non-reactive   Type & Screen    Collection Time: 01/17/23 12:08 PM   Result Value Ref Range    Group & Rh A POS     [employer] [place] NEG    Rubella antibody, IgG    Collection Time: 01/17/23 12:08 PM   Result Value Ref Range    Rubella IgG Antibodies 59.6 >=10.0 IU/mL    Rubella Immune Status Reactive     CBC auto differential    Collection Time: 01/17/23 12:08 PM   Result Value Ref Range    WBC 6.79 3.90 - 12.70 K/uL    RBC 3.92 (L) 4.00 - 5.40 M/uL    Hemoglobin 12.1 12.0 - 16.0 g/dL    Hematocrit 37.2 37.0 - 48.5 %    MCV 95 82 - 98 fL    MCH 30.9 27.0 - 31.0 pg    MCHC 32.5 32.0 - 36.0 g/dL    RDW 12.3 11.5 - 14.5 %    Platelets 265 150 - 450 K/uL    MPV 10.4 9.2 - 12.9 fL    Immature Granulocytes 0.1 0.0 - 0.5 %    Gran # (ANC) 3.2 1.8 - 7.7 K/uL    Immature Grans (Abs) 0.01 0.00 - 0.04 K/uL    Lymph # 3.0 1.0 - 4.8 K/uL    Mono # 0.5 0.3 - 1.0 K/uL    Eos # 0.0 0.0 - 0.5 K/uL    Baso # 0.05 0.00 - 0.20 K/uL    nRBC 0 0 /100 WBC    Gran % 47.8 38.0 - 73.0 %    Lymph % 43.6 18.0 - 48.0 %    Mono % 7.4 4.0 - 15.0 %    Eosinophil % 0.4 0.0 - 8.0 %    Basophil % 0.7 0.0 - 1.9 %    Differential Method Automated          Assessment    1. H/O migraine          Plan    Diagnoses and all orders for this visit:    H/O migraine  -     butalbital-acetaminophen-caffeine -40 mg (FIORICET, ESGIC) -40 mg per tablet; Take 1 tablet by mouth every 6 (six) hours as needed for Headaches.  -     Ambulatory referral/consult to Neurology; Future      Spoke with her current neurologist, Ms. Aguirre, along with her midwife, Ms. Cowart.  They both state that Tylenol should be fused first-line for migraines during pregnancy.  Can use Fioricet, as needed.  Will send prescription for Fioricet to the pharmacy today.      She really does not prefer to drive all the way to [place] for Neurology appointments.  Would like to see a neurologist locally.  Was able to schedule an appointment in late March with [facility].  Will place referral to [facility] to see if they can get her in sooner.  If not, we can always try [facility] instead.    FOLLOW-UP:  No follow-ups on file.    The patient location is: [place], [place]  The chief complaint leading to consultation is: Migraine, pregnancy    Visit type: audiovisual    Face to Face time with  patient: 20 min    45 minutes of total time spent on the encounter, which includes face to face time and non-face to face time preparing to see the patient (eg, review of tests), Obtaining and/or reviewing separately obtained history, Documenting clinical information in the electronic or other health record, Independently interpreting results (not separately reported) and communicating results to the patient/family/caregiver, or Care coordination (not separately reported).     Each patient to whom he or she provides medical services by telemedicine is:  (1) informed of the relationship between the physician and patient and the respective role of any other health care provider with respect to management of the patient; and (2) notified that he or she may decline to receive medical services by telemedicine and may withdraw from such care at any time.    Signed by:  Gurinder Perez MD

## 2023-02-14 ENCOUNTER — OFFICE VISIT (OUTPATIENT)
Dept: PSYCHIATRY | Facility: CLINIC | Age: 28
End: 2023-02-14
Payer: COMMERCIAL

## 2023-02-14 VITALS
HEART RATE: 81 BPM | WEIGHT: 117.06 LBS | BODY MASS INDEX: 22.86 KG/M2 | DIASTOLIC BLOOD PRESSURE: 69 MMHG | SYSTOLIC BLOOD PRESSURE: 108 MMHG

## 2023-02-14 DIAGNOSIS — F90.0 ATTENTION DEFICIT HYPERACTIVITY DISORDER (ADHD), PREDOMINANTLY INATTENTIVE TYPE: ICD-10-CM

## 2023-02-14 DIAGNOSIS — F31.60 BIPOLAR I DISORDER, MOST RECENT EPISODE (OR CURRENT) MIXED: Primary | ICD-10-CM

## 2023-02-14 PROCEDURE — 90833 PSYTX W PT W E/M 30 MIN: CPT | Mod: S$GLB,,, | Performed by: PSYCHOLOGIST

## 2023-02-14 PROCEDURE — 3008F PR BODY MASS INDEX (BMI) DOCUMENTED: ICD-10-PCS | Mod: CPTII,S$GLB,, | Performed by: PSYCHOLOGIST

## 2023-02-14 PROCEDURE — 3074F PR MOST RECENT SYSTOLIC BLOOD PRESSURE < 130 MM HG: ICD-10-PCS | Mod: CPTII,S$GLB,, | Performed by: PSYCHOLOGIST

## 2023-02-14 PROCEDURE — 3008F BODY MASS INDEX DOCD: CPT | Mod: CPTII,S$GLB,, | Performed by: PSYCHOLOGIST

## 2023-02-14 PROCEDURE — 1159F PR MEDICATION LIST DOCUMENTED IN MEDICAL RECORD: ICD-10-PCS | Mod: CPTII,S$GLB,, | Performed by: PSYCHOLOGIST

## 2023-02-14 PROCEDURE — 99999 PR PBB SHADOW E&M-EST. PATIENT-LVL III: ICD-10-PCS | Mod: PBBFAC,,, | Performed by: PSYCHOLOGIST

## 2023-02-14 PROCEDURE — 3074F SYST BP LT 130 MM HG: CPT | Mod: CPTII,S$GLB,, | Performed by: PSYCHOLOGIST

## 2023-02-14 PROCEDURE — 1159F MED LIST DOCD IN RCRD: CPT | Mod: CPTII,S$GLB,, | Performed by: PSYCHOLOGIST

## 2023-02-14 PROCEDURE — 99215 PR OFFICE/OUTPT VISIT, EST, LEVL V, 40-54 MIN: ICD-10-PCS | Mod: S$GLB,,, | Performed by: PSYCHOLOGIST

## 2023-02-14 PROCEDURE — 3078F PR MOST RECENT DIASTOLIC BLOOD PRESSURE < 80 MM HG: ICD-10-PCS | Mod: CPTII,S$GLB,, | Performed by: PSYCHOLOGIST

## 2023-02-14 PROCEDURE — 90833 PR PSYCHOTHERAPY W/PATIENT W/E&M, 30 MIN (ADD ON): ICD-10-PCS | Mod: S$GLB,,, | Performed by: PSYCHOLOGIST

## 2023-02-14 PROCEDURE — 99999 PR PBB SHADOW E&M-EST. PATIENT-LVL III: CPT | Mod: PBBFAC,,, | Performed by: PSYCHOLOGIST

## 2023-02-14 PROCEDURE — 3078F DIAST BP <80 MM HG: CPT | Mod: CPTII,S$GLB,, | Performed by: PSYCHOLOGIST

## 2023-02-14 PROCEDURE — 99215 OFFICE O/P EST HI 40 MIN: CPT | Mod: S$GLB,,, | Performed by: PSYCHOLOGIST

## 2023-02-14 RX ORDER — LAMOTRIGINE 25 MG/1
TABLET ORAL
Qty: 42 TABLET | Refills: 0 | Status: SHIPPED | OUTPATIENT
Start: 2023-02-14 | End: 2023-03-22

## 2023-02-14 RX ORDER — LAMOTRIGINE 100 MG/1
TABLET ORAL
Qty: 42 TABLET | Refills: 0 | Status: SHIPPED | OUTPATIENT
Start: 2023-03-14 | End: 2023-03-22 | Stop reason: SDUPTHER

## 2023-02-14 NOTE — PROGRESS NOTES
"  Outpatient Psychiatry Follow-Up Visit     2/14/2023      Clinical Status of Patient:  Outpatient (Ambulatory)    Chief Complaint:  Iris Johnson is a 27 y.o. female who presents today for follow-up of mood and inattention/distractibility .      Preferred Name: Iris  Gender Identity: cis female  Preferred Pronouns: she/her    Impressions/Plan from last visit: Iris is known to me from a previous clinic.  She is treated for bipolar disorder and ADHD and has a history of PTSD.  She reported seeing ghosts but does not present as having a separate psychotic process.  She and her  are trying to conceive, and she has reportedly talk to her providers about continuing medication.  We will need to coordinate her treatment when she becomes pregnant. Discussed options for treating bipolar during pregnancy and the risks vs. benefits of the treatment options. Patient stated that she understood, and this will be further discussed with other providers as needed.     Medication Management: Discussed risks, benefits, and alternatives to treatment plan documented above with patient. I answered all patient questions related to this plan, and patient expressed understanding and agreement.   Continue Lamictal 200 mg, Seroquel 300 mg hs, Adderall 30 mg bid (sent 1 script to be filled 1/30/22)  Outside records/collateral information from additional sources: order [facility] initial visit and last visit with me--if not already in clinic  Counseling (outside of [facility])     Follow up in about 3 months (around 2/17/2023) for medication management.     Interval History and Content of Current Session: Iris attended her visit. Since we last met, she learned that she is pregnant and had stopped Lamictal and Adderall. She is 10 weeks tomorrow. She has been taking Seroquel. She has been concerned about her migraines--will be able to continue with botox, in the second semester. She had to take a leave from her work because she "could not focus " "or drive". She has no patience--has been more irritable. She is not able to get things done; easily forgetful. She has been emotionally labile, even during this visit. She is concerned about not having much interest in sex--we spent some time discussing roles and interest, etc.--also likelihood that her prior "hypersexuality" was related to hypomania. She is also planning to breastfeed. She reported that she was manic recently and "completely snapped"--high irritability, agitation, crying, racing thoughts, etc. During this visit, emotionally labile--cried at different times throughout.  I consulted with both Dr. Perez and Sabas regarding adding her Lamictal back to treat her bipolar.  Team members concurred that her stability at this point is of paramount importance. See plan below--will follow her monthly throughout her pregnancy.    Plan--continue Seroquel 300 mg hs, restart Lamictal 25 mg daily for 2 weeks, then 50 mg daily for 2 weeks, then 100 mg     since November 2022.        PSYCHOTHERAPY      Site: [facility]  Time: 20 minutes  Participants: Met with patient    Therapeutic Intervention Type: behavior modifying psychotherapy, supportive psychotherapy  Why chosen therapy is appropriate versus another modality: patient responds to this modality, evidence based practice    Target symptoms: anxiety , mood swings, pregnancy  Primary focus: see above    Outcome monitoring methods: self-report, observation    Patient's response to intervention:  The patient's response to intervention is accepting.    Progress toward goals:  The patient's progress toward goals is fair .    GAD7 2/14/2023 11/16/2022   1. Feeling nervous, anxious, or on edge? 3 1   2. Not being able to stop or control worrying? 3 1   3. Worrying too much about different things? 3 1   4. Trouble relaxing? 3 1   5. Being so restless that it is hard to sit still? 3 1   6. Becoming easily annoyed or irritable? 3 0   7. Feeling afraid " as if something awful might happen? 1 1   NOLVIA-7 Score 19 6      0-4 = Minimal anxiety  5-9 = Mild anxiety  10-14 = Moderate anxiety  15-21 = Severe anxiety       Depression Patient Health Questionnaire 8/1/2022   Over the last two weeks how often have you been bothered by little interest or pleasure in doing things Several days   Over the last two weeks how often have you been bothered by feeling down, depressed or hopeless Several days   PHQ-2 Total Score 2     0-4 = No intervention  5 to 9 = Mild  10 to 14 = Moderate  15 to 19 = Moderately severe  =20 = Severe    Review of Systems   PSYCHIATRIC: Pertinant items are noted in the narrative.    Past Medical, Family and Social History: The patient's past medical, family and social history have been reviewed and updated as appropriate within the electronic medical record - see encounter notes.      Current Outpatient Medications:     butalbital-acetaminophen-caffeine -40 mg (FIORICET, ESGIC) -40 mg per tablet, Take 1 tablet by mouth every 6 (six) hours as needed for Headaches., Disp: 30 tablet, Rfl: 0    ondansetron (ZOFRAN) 4 MG tablet, Take 1 tablet (4 mg total) by mouth every 8 (eight) hours as needed for Nausea. (Patient not taking: Reported on 1/24/2023), Disp: 15 tablet, Rfl: 6    PRENATAL VITAMIN 27 mg iron- 0.8 mg Tab, Take 1 tablet by mouth., Disp: , Rfl:     QUEtiapine (SEROQUEL) 300 MG Tab, Take 1 tablet (300 mg total) by mouth every evening., Disp: 90 tablet, Rfl: 3    Current Facility-Administered Medications:     onabotulinumtoxina injection 200 Units, 200 Units, Intramuscular, q12 weeks, Deb Aguirre NP, 200 Units at 12/05/22 1514    Compliance: yes    Side effects: see above    Risk Parameters:  Patient reports no suicidal ideation  Patient reports no homicidal ideation  Patient reports no self-injurious behavior  Patient reports no violent behavior    Exam (detailed: at least 9 elements; comprehensive: all 15 elements)    Constitutional  Vitals:  Most recent vital signs were reviewed.   Last 3 sets of Vitals    Vitals - 1 value per visit 1/19/2023 1/24/2023 2/14/2023   SYSTOLIC 118 110 108   DIASTOLIC 72 65 69   Pulse 83 - 81   Temp 98 - -   Resp 16 - -   SPO2 97 - -   Weight (lb) 113.1 113.98 117.06   Weight (kg) 51.3 51.7 53.1   Height 60 - -   BMI (Calculated) 22.1 - -   VISIT REPORT - - -   Pain Score  - - -          General:  age appropriate, casually dressed, neatly groomed, hair pulled up     Musculoskeletal  Muscle Strength/Tone:  no tremor, no tic   Gait & Station:  non-ataxic     Psychiatric  Speech:  no latency; no press   Behavior: wnl   Mood & Affect:  Agitated; emotional  labile   Thought Process:  normal and logical   Associations:  intact   Thought Content:  normal, no suicidality, no homicidality, delusions, or paranoia   Insight:  has awareness of illness   Judgement: behavior is adequate to circumstances   Orientation:  grossly intact   Memory: intact for content of interview   Language: grossly intact   Attention Span & Concentration:  Grossly intact   Fund of Knowledge:  intact and appropriate to age and level of education     Assessment and Diagnosis   Status/Progress: Based on the examination today, the patient's problem(s) is/are inadequately controlled.  New problems have been presented today.   Co-morbidities are complicating management of the primary condition.  There are no active rule-out diagnoses for this patient at this time.     General Impression:     Encounter Diagnoses   Name Primary?    Bipolar I disorder, most recent episode (or current) mixed Yes    Attention deficit hyperactivity disorder (ADHD), predominantly inattentive type          Intervention/Counseling/Treatment Plan   Medication Management: Discussed risks, benefits, and alternatives to treatment plan documented above with patient. I answered all patient questions related to this plan, and patient expressed understanding and  agreement.   continue Seroquel 300 mg hs, restart Lamictal 25 mg daily for 2 weeks, then 50 mg daily for 2 weeks, then 100 mg  Care Coordination: During the visit, care coordination was conducted with  James.  Counseling needed but restricted by time/resources--will follow monthly  The risk of developing a rare but very serious rash while taking Lamictal was discussed. Patient was advised to take Lamictal exactly as prescribed, not to increase Lamictal unless directed and not to stop and start this medication. It was explained that patient needs to stay current with refills and can not miss taking Lamictal more than 4 days or it will have to be restarted at a reduced dose under prescriber supervision. Patient was advised not to start any new products while starting Lamictal. Should a mild rash develop, contact the nurse immediately for instructions. If a severe rash develops, discontinue Lamictal immediately and contact the nurse. Go to the ER for treatment if needed.  Discussed options for treating depression during pregnancy and the risks vs. benefits of the treatment options. Patient stated that she understood. Provided information in patient portal for further review, and encouraged patient to reach out with any questions.       Return to Clinic: 1 month    Medication List with Changes/Refills   Current Medications    BUTALBITAL-ACETAMINOPHEN-CAFFEINE -40 MG (FIORICET, ESGIC) -40 MG PER TABLET    Take 1 tablet by mouth every 6 (six) hours as needed for Headaches.    ONDANSETRON (ZOFRAN) 4 MG TABLET    Take 1 tablet (4 mg total) by mouth every 8 (eight) hours as needed for Nausea.    PRENATAL VITAMIN 27 MG IRON- 0.8 MG TAB    Take 1 tablet by mouth.    QUETIAPINE (SEROQUEL) 300 MG TAB    Take 1 tablet (300 mg total) by mouth every evening.        I spent an additional 38 minutes performing E/M services with >50% spent on counseling, guidance, coordinating care (not Psychotherapy related)  in addition to the 20 minutes performing Psychotherapy.    Time spent with pt including note preparation: 58 minutes     Tiffanie Galvez, PhD, MP  Advanced Practice Medical Psychologist  [facility]  [id number] [place] [address].  TATIANA Ziegler [id number] [phone]   [phone] fax

## 2023-02-14 NOTE — PATIENT INSTRUCTIONS
"[facility]PART[facility] OF [facility]YCHIATRY   PATIENT INFORMATION    We appreciate the opportunity to participate in your medical care and hope the following protocols will make it easier for you to receive quality treatment in our department.    PUNCTUALITY: Your appointment is scheduled for a fixed amount of time, reserved especially for you.  To get the benefit of your appointment, please arrive at least 15 minutes early to allow time for traffic, parking and registration.  Should you arrive more than 15 minutes late to your appointment, you will be rescheduled in order to assure your clinician has adequate time to assess you and provide helpful care.      APPOINTMENTS: Appointments are made by the nursing/front office staff or through the patient portal. Providers do not have access  to schedule appointments. Walk in appointments are not available. FOR EMERGENCIES, PLEASE GO THE CLOSEST EMERGENCY ROOM.    CANCELLATION/MISSED APPOINTMENTS:   In order to receive quality care, all appointments must be kept.  If you are unable to keep an appointment, please reschedule at least 3 days prior if possible. Late cancellations (within 24 hours of the appointment) and repeated no-show appointments may result in dismissal from the clinic. After two no show/late cancellation visits, you will receive a notice letter, alerting you to keep visits to prevent department dismissal. If another visit is missed after receipt of the notice, you will be discharged from the clinic. This policy is in effect to allow for other individuals on a long waiting list to be seen as soon as possible. Unlike other branches of medicine where several individuals can be scheduled in a 30 minute time slot, only one individual can be scheduled in any time slot in Psychiatry.     MESSAGES: For simple questions/concerns, you may contact your individual providers electronically through the "[facility]" portal or by calling [phone] " with messages relayed via office staff. If relevant, include pharmacy name and phone number, date of last visit and next scheduled visit, phone number where you can be reached throughout the day, and whether leaving a voicemail or message on an answering machine is acceptable. Messages will be returned by the Medical Assistant or Office Staff after your provider has reviewed the message.  Please allow 24 hours for a returned message before leaving another message. Messages will be checked each workday (Monday through Friday) during office hours (8:00 a.m. and 5:00 p.m.) and returned at most within one business day.  You may leave a non-urgent message after hours. Note that psychotherapy and medication management are not appropriate by telephone or the patient portal.    PRESCRIPTION REFILLS:  Please communicate with your prescriber about any refills you need during your appointment. You may also request refills through the [facility] portal (preferred) or by calling the clinic. Prescriptions will be filled during office hours.     Please do not wait until you are completely out of medication to request refills. Same day refills are not always possible. Patients may experience symptoms of withdrawal if they run out of medications. The patient assumes all responsibility when there is an issue with non-compliance with follow-up appointments and medications.  Some medications are controlled and regulated by the FDA and [place]. Some of these medications can not be refilled before 30 days and require a face to face appointment.     PAPERWORK REQUESTS: If you have any forms or letters that need to be completed by your doctor, please present these at the beginning of the appointment to ensure that information needed to complete them is obtained during the office visit. Paperwork will be returned within 7-10 business days. Staff will call you to  the paperwork when completed.    SPECIAL EVALUATIONS: Please note that our  "department is treatment-focused. As such, we focus on treatment-oriented evaluations and do not perform specialty or "forensic" evaluations. Examples are listed below.    Disability: We do not do disability evaluations.  Please contact Social Security Administration for evaluations and determinations. You will then sign releases allowing for records from your treatment providers to be forwarded to Social Security Administration to use in their evaluation.  Gun Permit: We do not offer Sound Judgment Evaluations or assessments leading to gun ownership, nor do we fill out or file paperwork relevant to owning, concealing or purchasing a firearm.  Emotional Support     Animals (ROSAMARIA): We do not provide documentation, including letters, to aid in the acclamation that an Emotional Support Animal is required. Note that ESAs are not trained to perform tasks or recognize particular signs or symptoms. Rather, they are distinguished by the close, emotional, and supportive bond between the animal and the owner.       SAMPLES: We do not provide samples of any medications. If you have financial difficulties and are on a limited income, you may qualify for Patient Assistance Programs from various pharmaceutical companies. This will require that you complete paperwork with your financial information, but this does not guarantee that the company will approve the application. Alternative medication options can be discussed.    REFERRALS/COORDINATION: You will be referred to other providers if we feel unable to adequately diagnose or treat your particular condition, or if collaboration with another provider would allow for better management of your condition.       Call In if problems  Call Report Side Effects   Encouraged to follow up with primary care / Gen Med MD for continued monitoring of general health and wellness  Call 911 Or go to ER if Acute Concerns (especially if any thoughts of harm to self or other)  The risk of " developing a rare but very serious rash while taking Lamictal was discussed. Patient was advised to take Lamictal exactly as prescribed, not to increase Lamictal unless directed and not to stop and start this medication. It was explained that patient needs to stay current with refills and can not miss taking Lamictal more than 4 days or it will have to be restarted at a reduced dose under prescriber supervision. Patient was advised not to start any new products while starting Lamictal. Should a mild rash develop, contact the nurse immediately for instructions. If a severe rash develops, discontinue Lamictal immediately and contact the nurse. Go to the ER for treatment if needed.    Discussed the following issues related to psychiatric treatment in pregnancy   -studies are limited, inadequate or non-existent   -conflicting but some large studies suggest that antidepressants including non-SSRIs shorten pregnancy in a dose dependant manner   -SRI medications in the second trimester likely increase the risk of PPHTN and are associated w/  irritabiiltiy  -failure to treat depression/mood in pregnancy has risks for patient,  and existing family. The decision to use medication is individual to each patient.    Discussed options for treating depression during pregnancy and the risks vs. benefits of the treatment options. Patient stated that she understood. Provided information in patient portal for further review, and encouraged patient to reach out with any questions.           Tiffanie Galvez, PhD, MP  Advanced Practice Medical Psychologist  [facility] [address]  [place] [id number]  [phone]   [phone] fax

## 2023-02-20 ENCOUNTER — PATIENT MESSAGE (OUTPATIENT)
Dept: OBSTETRICS AND GYNECOLOGY | Facility: CLINIC | Age: 28
End: 2023-02-20
Payer: COMMERCIAL

## 2023-02-21 ENCOUNTER — ROUTINE PRENATAL (OUTPATIENT)
Dept: OBSTETRICS AND GYNECOLOGY | Facility: CLINIC | Age: 28
End: 2023-02-21
Payer: COMMERCIAL

## 2023-02-21 VITALS — DIASTOLIC BLOOD PRESSURE: 62 MMHG | SYSTOLIC BLOOD PRESSURE: 116 MMHG | BODY MASS INDEX: 21.68 KG/M2 | WEIGHT: 111 LBS

## 2023-02-21 DIAGNOSIS — Z3A.10 10 WEEKS GESTATION OF PREGNANCY: Primary | ICD-10-CM

## 2023-02-21 DIAGNOSIS — F31.61 BIPOLAR DISORDER, CURRENT EPISODE MIXED, MILD: ICD-10-CM

## 2023-02-21 DIAGNOSIS — Z34.91 NORMAL PREGNANCY IN FIRST TRIMESTER: ICD-10-CM

## 2023-02-21 PROCEDURE — 99999 PR PBB SHADOW E&M-EST. PATIENT-LVL III: ICD-10-PCS | Mod: PBBFAC,,, | Performed by: ADVANCED PRACTICE MIDWIFE

## 2023-02-21 PROCEDURE — 99999 PR PBB SHADOW E&M-EST. PATIENT-LVL III: CPT | Mod: PBBFAC,,, | Performed by: ADVANCED PRACTICE MIDWIFE

## 2023-02-21 PROCEDURE — 0502F PR SUBSEQUENT PRENATAL CARE: ICD-10-PCS | Mod: CPTII,S$GLB,, | Performed by: ADVANCED PRACTICE MIDWIFE

## 2023-02-21 PROCEDURE — 0502F SUBSEQUENT PRENATAL CARE: CPT | Mod: CPTII,S$GLB,, | Performed by: ADVANCED PRACTICE MIDWIFE

## 2023-02-21 NOTE — PROGRESS NOTES
27 y.o. female  at 10w6d   denies VB or cramping  Doing well without concerns, Having botox for migraines 3/6 Back on Lamictal  Per psych   First trimester s/s improving:   TWG: -2lbs   Reviewed prenatal labs  Genetic testing insurance will not cover  Flu vaccine recommended and declines  Reviewed warning signs, pregnancy precautions and how/when to call.  RTC x 4 wks, call or present sooner prn.     I spent a total of 15 minutes on the day of the visit.This includes face to face time and non-face to face time preparing to see the patient (eg, review of tests), Obtaining and/or reviewing separately obtained history, Documenting clinical information in the electronic or other health record, Independently interpreting resultsand communicating results to the patient/family/caregiver, or Care coordination.

## 2023-03-02 ENCOUNTER — PATIENT MESSAGE (OUTPATIENT)
Dept: ADMINISTRATIVE | Facility: OTHER | Age: 28
End: 2023-03-02
Payer: COMMERCIAL

## 2023-03-06 ENCOUNTER — PROCEDURE VISIT (OUTPATIENT)
Dept: NEUROLOGY | Facility: CLINIC | Age: 28
End: 2023-03-06
Payer: COMMERCIAL

## 2023-03-06 ENCOUNTER — TELEPHONE (OUTPATIENT)
Dept: NEUROLOGY | Facility: CLINIC | Age: 28
End: 2023-03-06
Payer: COMMERCIAL

## 2023-03-06 VITALS
BODY MASS INDEX: 21.79 KG/M2 | DIASTOLIC BLOOD PRESSURE: 68 MMHG | RESPIRATION RATE: 17 BRPM | SYSTOLIC BLOOD PRESSURE: 107 MMHG | HEIGHT: 60 IN | HEART RATE: 90 BPM | WEIGHT: 111 LBS

## 2023-03-06 DIAGNOSIS — G43.719 INTRACTABLE CHRONIC MIGRAINE WITHOUT AURA AND WITHOUT STATUS MIGRAINOSUS: Primary | ICD-10-CM

## 2023-03-06 PROCEDURE — 64615 PR CHEMODENERVATION OF MUSCLE FOR CHRONIC MIGRAINE: ICD-10-PCS | Mod: S$GLB,,, | Performed by: NURSE PRACTITIONER

## 2023-03-06 PROCEDURE — 64615 CHEMODENERV MUSC MIGRAINE: CPT | Mod: S$GLB,,, | Performed by: NURSE PRACTITIONER

## 2023-03-06 NOTE — TELEPHONE ENCOUNTER
----- Message from Anibal Holm sent at 3/6/2023  8:14 AM CST -----  Contact: [phone]  Pt is calling regarding appt . Please call back at [phone] . Thanks.dj

## 2023-03-06 NOTE — TELEPHONE ENCOUNTER
Pt called in about appt scheduled . She wanted to know if dr. Rojas does botox advised her he is our vascular neurologist he treats stroke and tia . She verbalizes understanding .

## 2023-03-06 NOTE — PROCEDURES
Procedures     BOTOX PROCEDURE    PROCEDURE PERFORMED: Botulinum toxin injection (78739)    CLINICAL INDICATION: G43.719    Cycle #5    A time out was conducted just before the start of the procedure to verify the correct patient and procedure, procedure location, and all relevant critical information.   Signed consent obtained prior to procedure - patient is currently 12 weeks pregnant. Risks/benefits discussed.    Conventional methods of treatment but the patient has been unresponsive and refractory.The patient meets criteria for chronic headaches according to the ICHD-III, the patient has more than 15 headaches a month at least 8 of them meet migraine criteria, which last for more than 4 hours a day.     Last Botox injections were on 6/20/22  and  there has been over 50%  improvement in the patient's symptoms. Frequency of treatment is every 12 weeks unless no response to the treatments, at which time we will discontinue the injections.     DESCRIPTION OF PROCEDURE: After obtaining informed consent and under  aseptic technique, a total of 155 units of botulinum toxin type A were   injected in the following muscles: Procerus 5 units,  5 units  bilaterally, frontalis 20 units, temporalis 20 units bilaterally,  occipitalis 15 units, upper cervical paraspinals 10 units bilaterally and trapezius 15 units bilaterally. The patient was given a total of 155 units in 31 sites.    The patient tolerated the procedure well. There were no complications. The patient was given a prescription for repeat treatment in 12 weeks.     Unavoidable waste 45 units    RTC in 12 weeks.     SERA Goodman

## 2023-03-21 ENCOUNTER — ROUTINE PRENATAL (OUTPATIENT)
Dept: OBSTETRICS AND GYNECOLOGY | Facility: CLINIC | Age: 28
End: 2023-03-21
Payer: COMMERCIAL

## 2023-03-21 VITALS
SYSTOLIC BLOOD PRESSURE: 138 MMHG | WEIGHT: 124.56 LBS | DIASTOLIC BLOOD PRESSURE: 70 MMHG | BODY MASS INDEX: 24.33 KG/M2

## 2023-03-21 DIAGNOSIS — F31.61 BIPOLAR DISORDER, CURRENT EPISODE MIXED, MILD: ICD-10-CM

## 2023-03-21 DIAGNOSIS — Z3A.14 14 WEEKS GESTATION OF PREGNANCY: ICD-10-CM

## 2023-03-21 DIAGNOSIS — Z36.89 ENCOUNTER FOR FETAL ANATOMIC SURVEY: Primary | ICD-10-CM

## 2023-03-21 DIAGNOSIS — Z34.92 NORMAL PREGNANCY IN SECOND TRIMESTER: ICD-10-CM

## 2023-03-21 PROCEDURE — 0502F SUBSEQUENT PRENATAL CARE: CPT | Mod: CPTII,S$GLB,, | Performed by: ADVANCED PRACTICE MIDWIFE

## 2023-03-21 PROCEDURE — 0502F PR SUBSEQUENT PRENATAL CARE: ICD-10-PCS | Mod: CPTII,S$GLB,, | Performed by: ADVANCED PRACTICE MIDWIFE

## 2023-03-21 PROCEDURE — 99999 PR PBB SHADOW E&M-EST. PATIENT-LVL III: CPT | Mod: PBBFAC,,, | Performed by: ADVANCED PRACTICE MIDWIFE

## 2023-03-21 PROCEDURE — 99999 PR PBB SHADOW E&M-EST. PATIENT-LVL III: ICD-10-PCS | Mod: PBBFAC,,, | Performed by: ADVANCED PRACTICE MIDWIFE

## 2023-03-21 NOTE — PROGRESS NOTES
"27 y.o. female  at 14w6d    feeling flutters, denies VB, LOF or cramping  Doing well without concerns   TW lbs   Reviewed prenatal labs  Genetic testing declines  Anatomy scan ordered   Patient  was provided with [facility] handouts: Benefits of Breastfeeding, "Exclusive Breastfeeding," and Skin to Skin with Your Baby. Discussed benefits of skin to skin, the magic first hour, delaying routine procedures, benefits of breastfeeding, and the importance of the first early feeding, and the importance of exclusive breastfeeding. Encouraged patient to attend [facility]s Prenatal Breastfeeding Class and to download the [employer] mobile julianna if she has not already done so.  Patient verbalizes understanding.     Reviewed warning signs, pregnancy precautions and how/when to call.  RTC x 4 wks, call or present sooner prn.     I spent a total of 15 minutes on the day of the visit.This includes face to face time and non-face to face time preparing to see the patient (eg, review of tests), Obtaining and/or reviewing separately obtained history, Documenting clinical information in the electronic or other health record, Independently interpreting resultsand communicating results to the patient/family/caregiver, or Care coordination.           "

## 2023-03-22 ENCOUNTER — OFFICE VISIT (OUTPATIENT)
Dept: PSYCHIATRY | Facility: CLINIC | Age: 28
End: 2023-03-22
Payer: COMMERCIAL

## 2023-03-22 VITALS
SYSTOLIC BLOOD PRESSURE: 106 MMHG | WEIGHT: 125.25 LBS | BODY MASS INDEX: 24.46 KG/M2 | HEART RATE: 99 BPM | DIASTOLIC BLOOD PRESSURE: 69 MMHG

## 2023-03-22 DIAGNOSIS — F31.9 BIPOLAR DISEASE DURING PREGNANCY IN SECOND TRIMESTER: Primary | ICD-10-CM

## 2023-03-22 DIAGNOSIS — O99.342 BIPOLAR DISEASE DURING PREGNANCY IN SECOND TRIMESTER: Primary | ICD-10-CM

## 2023-03-22 DIAGNOSIS — F90.0 ATTENTION DEFICIT HYPERACTIVITY DISORDER (ADHD), PREDOMINANTLY INATTENTIVE TYPE: ICD-10-CM

## 2023-03-22 DIAGNOSIS — F31.60 BIPOLAR I DISORDER, MOST RECENT EPISODE (OR CURRENT) MIXED: ICD-10-CM

## 2023-03-22 PROCEDURE — 3078F DIAST BP <80 MM HG: CPT | Mod: CPTII,S$GLB,, | Performed by: PSYCHOLOGIST

## 2023-03-22 PROCEDURE — 99999 PR PBB SHADOW E&M-EST. PATIENT-LVL II: ICD-10-PCS | Mod: PBBFAC,,, | Performed by: PSYCHOLOGIST

## 2023-03-22 PROCEDURE — 3078F PR MOST RECENT DIASTOLIC BLOOD PRESSURE < 80 MM HG: ICD-10-PCS | Mod: CPTII,S$GLB,, | Performed by: PSYCHOLOGIST

## 2023-03-22 PROCEDURE — 99214 PR OFFICE/OUTPT VISIT, EST, LEVL IV, 30-39 MIN: ICD-10-PCS | Mod: S$GLB,,, | Performed by: PSYCHOLOGIST

## 2023-03-22 PROCEDURE — 99214 OFFICE O/P EST MOD 30 MIN: CPT | Mod: S$GLB,,, | Performed by: PSYCHOLOGIST

## 2023-03-22 PROCEDURE — 3074F SYST BP LT 130 MM HG: CPT | Mod: CPTII,S$GLB,, | Performed by: PSYCHOLOGIST

## 2023-03-22 PROCEDURE — 3008F BODY MASS INDEX DOCD: CPT | Mod: CPTII,S$GLB,, | Performed by: PSYCHOLOGIST

## 2023-03-22 PROCEDURE — 3074F PR MOST RECENT SYSTOLIC BLOOD PRESSURE < 130 MM HG: ICD-10-PCS | Mod: CPTII,S$GLB,, | Performed by: PSYCHOLOGIST

## 2023-03-22 PROCEDURE — 1159F PR MEDICATION LIST DOCUMENTED IN MEDICAL RECORD: ICD-10-PCS | Mod: CPTII,S$GLB,, | Performed by: PSYCHOLOGIST

## 2023-03-22 PROCEDURE — 90833 PR PSYCHOTHERAPY W/PATIENT W/E&M, 30 MIN (ADD ON): ICD-10-PCS | Mod: S$GLB,,, | Performed by: PSYCHOLOGIST

## 2023-03-22 PROCEDURE — 99999 PR PBB SHADOW E&M-EST. PATIENT-LVL II: CPT | Mod: PBBFAC,,, | Performed by: PSYCHOLOGIST

## 2023-03-22 PROCEDURE — 3008F PR BODY MASS INDEX (BMI) DOCUMENTED: ICD-10-PCS | Mod: CPTII,S$GLB,, | Performed by: PSYCHOLOGIST

## 2023-03-22 PROCEDURE — 90833 PSYTX W PT W E/M 30 MIN: CPT | Mod: S$GLB,,, | Performed by: PSYCHOLOGIST

## 2023-03-22 PROCEDURE — 1159F MED LIST DOCD IN RCRD: CPT | Mod: CPTII,S$GLB,, | Performed by: PSYCHOLOGIST

## 2023-03-22 RX ORDER — LAMOTRIGINE 200 MG/1
200 TABLET ORAL EVERY MORNING
Qty: 30 TABLET | Refills: 2
Start: 2023-03-22 | End: 2023-06-20 | Stop reason: SDUPTHER

## 2023-03-22 NOTE — PATIENT INSTRUCTIONS

## 2023-03-29 ENCOUNTER — PATIENT MESSAGE (OUTPATIENT)
Dept: OTHER | Facility: OTHER | Age: 28
End: 2023-03-29
Payer: COMMERCIAL

## 2023-04-05 ENCOUNTER — PATIENT MESSAGE (OUTPATIENT)
Dept: OTHER | Facility: OTHER | Age: 28
End: 2023-04-05
Payer: COMMERCIAL

## 2023-04-11 NOTE — PROGRESS NOTES
NOB s=d EDC 6w6d no fluid seen in cul de sac  Labs reviewed  Taking amoxil for ear infection  A-Z book given and reviewed  Weight goal 25 pounds set  Mat 21 at 10 weeks if insurance covers  
Resident

## 2023-04-25 ENCOUNTER — PROCEDURE VISIT (OUTPATIENT)
Dept: OBSTETRICS AND GYNECOLOGY | Facility: CLINIC | Age: 28
End: 2023-04-25
Payer: COMMERCIAL

## 2023-04-25 ENCOUNTER — ROUTINE PRENATAL (OUTPATIENT)
Dept: OBSTETRICS AND GYNECOLOGY | Facility: CLINIC | Age: 28
End: 2023-04-25
Payer: COMMERCIAL

## 2023-04-25 VITALS
DIASTOLIC BLOOD PRESSURE: 69 MMHG | WEIGHT: 131.38 LBS | SYSTOLIC BLOOD PRESSURE: 113 MMHG | BODY MASS INDEX: 25.66 KG/M2

## 2023-04-25 DIAGNOSIS — Z36.89 ENCOUNTER FOR FETAL ANATOMIC SURVEY: ICD-10-CM

## 2023-04-25 DIAGNOSIS — Z3A.19 19 WEEKS GESTATION OF PREGNANCY: Primary | ICD-10-CM

## 2023-04-25 DIAGNOSIS — Z34.92 NORMAL PREGNANCY IN SECOND TRIMESTER: ICD-10-CM

## 2023-04-25 DIAGNOSIS — F31.61 BIPOLAR DISORDER, CURRENT EPISODE MIXED, MILD: ICD-10-CM

## 2023-04-25 PROCEDURE — 0502F SUBSEQUENT PRENATAL CARE: CPT | Mod: CPTII,S$GLB,, | Performed by: ADVANCED PRACTICE MIDWIFE

## 2023-04-25 PROCEDURE — 0502F PR SUBSEQUENT PRENATAL CARE: ICD-10-PCS | Mod: CPTII,S$GLB,, | Performed by: ADVANCED PRACTICE MIDWIFE

## 2023-04-25 PROCEDURE — 99999 PR PBB SHADOW E&M-EST. PATIENT-LVL III: ICD-10-PCS | Mod: PBBFAC,,, | Performed by: ADVANCED PRACTICE MIDWIFE

## 2023-04-25 PROCEDURE — 76805 OB US >/= 14 WKS SNGL FETUS: CPT | Mod: S$GLB,,, | Performed by: OBSTETRICS & GYNECOLOGY

## 2023-04-25 PROCEDURE — 76805 US OB/GYN PROCEDURE (VIEWPOINT): ICD-10-PCS | Mod: S$GLB,,, | Performed by: OBSTETRICS & GYNECOLOGY

## 2023-04-25 PROCEDURE — 99999 PR PBB SHADOW E&M-EST. PATIENT-LVL III: CPT | Mod: PBBFAC,,, | Performed by: ADVANCED PRACTICE MIDWIFE

## 2023-04-25 NOTE — PROGRESS NOTES
28 y.o. female  at 19w6d    feeling flutters/FM, denies VB, LOF or cramping  Doing well without concerns   TW lbs     Reviewed anatomy US-normal fetal anatomy with no obvious abnormalities.  S=D. Normal amniotic fluid, normal placental location in posterior position, no evidence of previa. Normal appearing cervical length  . male gender. 3VC. EFW 11 oz .   Genetic testing  declined    19 weeks gestation of pregnancy    Normal pregnancy in second trimester    Bipolar disorder, current episode mixed, mild       Reviewed warning signs, normal FM,  labor precautions and how/when to call.  RTC x 4 wks, call or present sooner prn.

## 2023-04-26 ENCOUNTER — PATIENT MESSAGE (OUTPATIENT)
Dept: OTHER | Facility: OTHER | Age: 28
End: 2023-04-26
Payer: COMMERCIAL

## 2023-05-05 ENCOUNTER — TELEPHONE (OUTPATIENT)
Dept: OBSTETRICS AND GYNECOLOGY | Facility: CLINIC | Age: 28
End: 2023-05-05
Payer: COMMERCIAL

## 2023-05-05 NOTE — TELEPHONE ENCOUNTER
Informed pt that appt on 5/23 with CN needed to be r/s due to [facility]. Pt r/s appt/us Pt verbalized understanding and agreed to date, time and location of appts.

## 2023-05-24 ENCOUNTER — PATIENT MESSAGE (OUTPATIENT)
Dept: OTHER | Facility: OTHER | Age: 28
End: 2023-05-24
Payer: COMMERCIAL

## 2023-06-07 ENCOUNTER — ROUTINE PRENATAL (OUTPATIENT)
Dept: OBSTETRICS AND GYNECOLOGY | Facility: CLINIC | Age: 28
End: 2023-06-07
Payer: COMMERCIAL

## 2023-06-07 ENCOUNTER — TELEPHONE (OUTPATIENT)
Dept: OBSTETRICS AND GYNECOLOGY | Facility: CLINIC | Age: 28
End: 2023-06-07
Payer: COMMERCIAL

## 2023-06-07 ENCOUNTER — PATIENT MESSAGE (OUTPATIENT)
Dept: OBSTETRICS AND GYNECOLOGY | Facility: CLINIC | Age: 28
End: 2023-06-07

## 2023-06-07 ENCOUNTER — PATIENT MESSAGE (OUTPATIENT)
Dept: OTHER | Facility: OTHER | Age: 28
End: 2023-06-07
Payer: COMMERCIAL

## 2023-06-07 VITALS
DIASTOLIC BLOOD PRESSURE: 80 MMHG | WEIGHT: 125.25 LBS | SYSTOLIC BLOOD PRESSURE: 124 MMHG | BODY MASS INDEX: 24.46 KG/M2

## 2023-06-07 DIAGNOSIS — F31.9 BIPOLAR AFFECTIVE DISORDER, REMISSION STATUS UNSPECIFIED: ICD-10-CM

## 2023-06-07 DIAGNOSIS — Z36.2 ENCOUNTER FOR FOLLOW-UP ULTRASOUND OF FETAL ANATOMY: ICD-10-CM

## 2023-06-07 DIAGNOSIS — Z34.82 ENCOUNTER FOR SUPERVISION OF OTHER NORMAL PREGNANCY IN SECOND TRIMESTER: Primary | ICD-10-CM

## 2023-06-07 PROBLEM — Z34.92 ENCOUNTER FOR SUPERVISION OF NORMAL PREGNANCY IN SECOND TRIMESTER: Status: ACTIVE | Noted: 2023-06-07

## 2023-06-07 PROCEDURE — 99999 PR PBB SHADOW E&M-EST. PATIENT-LVL III: ICD-10-PCS | Mod: PBBFAC,,, | Performed by: ADVANCED PRACTICE MIDWIFE

## 2023-06-07 PROCEDURE — 99999 PR PBB SHADOW E&M-EST. PATIENT-LVL III: CPT | Mod: PBBFAC,,, | Performed by: ADVANCED PRACTICE MIDWIFE

## 2023-06-07 PROCEDURE — 0502F SUBSEQUENT PRENATAL CARE: CPT | Mod: S$GLB,,, | Performed by: ADVANCED PRACTICE MIDWIFE

## 2023-06-07 PROCEDURE — 0502F PR SUBSEQUENT PRENATAL CARE: ICD-10-PCS | Mod: S$GLB,,, | Performed by: ADVANCED PRACTICE MIDWIFE

## 2023-06-07 NOTE — TELEPHONE ENCOUNTER
Patient stated she was going to be 10 min late. Patient still has not arrived at 11:20 am. Patient may need to r/s appt if not here by 11:30 am.

## 2023-06-07 NOTE — TELEPHONE ENCOUNTER
Attempted to contact pt to inquire what her ETA was after statinfg she would be 10 mins late for 11 am appt with JA.Pt did not answer. Unable to lvm.

## 2023-06-07 NOTE — PROGRESS NOTES
28 y.o. female  at 26w0d   Reports + FM, denies VB, LOF, or cramping  Doing ok, reports that the seroquel is not working like it was before, sees a therapist at [facility] named Tiffanie Galvez at the [place] and will contact her  She is getting botox injections for her migraines next week, she reports that she has done the research and understands the risks in pregnancy  /80   Wt 56.8 kg (125 lb 3.5 oz)   LMP  (LMP Unknown)   BMI 24.46 kg/m²   TW lbs .  Reviewed upcoming 28wk labs, (A POS) and orders placed  Tdap handout provided and explained  Was signed up for classes on zoom and never received the link, contacted Nara at the hospital and she will call the patient.     Encounter for supervision of other normal pregnancy in second trimester  -     OB Glucose Screen; Future; Expected date: 2023  -     CBC auto differential; Future; Expected date: 2023  -     HIV 1/2 Ag/Ab (4th Gen); Future; Expected date: 2023  -     RPR; Future; Expected date: 2023    Bipolar affective disorder, remission status unspecified  -     Cancel: Ambulatory referral/consult to Psychiatry; Future; Expected date: 2023    Encounter for follow-up ultrasound of fetal anatomy  -     US OB/GYN Procedure ([facility])-Future; Future    Reviewed warning signs, normal FM,  labor precautions and how/when to call.  RTC x 4 wks, call or present sooner prn.

## 2023-06-08 ENCOUNTER — PROCEDURE VISIT (OUTPATIENT)
Dept: NEUROLOGY | Facility: CLINIC | Age: 28
End: 2023-06-08
Payer: COMMERCIAL

## 2023-06-08 VITALS
SYSTOLIC BLOOD PRESSURE: 120 MMHG | BODY MASS INDEX: 24.59 KG/M2 | RESPIRATION RATE: 17 BRPM | WEIGHT: 125.25 LBS | HEIGHT: 60 IN | HEART RATE: 76 BPM | DIASTOLIC BLOOD PRESSURE: 78 MMHG

## 2023-06-08 DIAGNOSIS — G43.719 INTRACTABLE CHRONIC MIGRAINE WITHOUT AURA AND WITHOUT STATUS MIGRAINOSUS: Primary | ICD-10-CM

## 2023-06-08 PROCEDURE — 64615 PR CHEMODENERVATION OF MUSCLE FOR CHRONIC MIGRAINE: ICD-10-PCS | Mod: S$GLB,,, | Performed by: NURSE PRACTITIONER

## 2023-06-08 PROCEDURE — 64615 CHEMODENERV MUSC MIGRAINE: CPT | Mod: S$GLB,,, | Performed by: NURSE PRACTITIONER

## 2023-06-08 NOTE — PROCEDURES
Procedures     BOTOX PROCEDURE    PROCEDURE PERFORMED: Botulinum toxin injection (68897)    CLINICAL INDICATION: G43.719    Cycle #6    A time out was conducted just before the start of the procedure to verify the correct patient and procedure, procedure location, and all relevant critical information.   Signed consent obtained prior to procedure - patient is currently 26 weeks pregnant. Risks/benefits discussed.    Conventional methods of treatment but the patient has been unresponsive and refractory.The patient meets criteria for chronic headaches according to the ICHD-III, the patient has more than 15 headaches a month at least 8 of them meet migraine criteria, which last for more than 4 hours a day.     Last Botox injections were on 6/20/22  and  there has been over 50%  improvement in the patient's symptoms. Frequency of treatment is every 12 weeks unless no response to the treatments, at which time we will discontinue the injections.     DESCRIPTION OF PROCEDURE: After obtaining informed consent and under  aseptic technique, a total of 155 units of botulinum toxin type A were   injected in the following muscles: Procerus 5 units,  5 units  bilaterally, frontalis 20 units, temporalis 20 units bilaterally,  occipitalis 15 units, upper cervical paraspinals 10 units bilaterally and trapezius 15 units bilaterally. The patient was given a total of 155 units in 31 sites.    The patient tolerated the procedure well. There were no complications. The patient was given a prescription for repeat treatment in 12 weeks.     Unavoidable waste 45 units    RTC in 12 weeks.     SERA Goodman

## 2023-06-20 ENCOUNTER — OFFICE VISIT (OUTPATIENT)
Dept: PSYCHIATRY | Facility: CLINIC | Age: 28
End: 2023-06-20
Payer: COMMERCIAL

## 2023-06-20 VITALS
HEART RATE: 76 BPM | SYSTOLIC BLOOD PRESSURE: 115 MMHG | WEIGHT: 122.81 LBS | BODY MASS INDEX: 23.98 KG/M2 | DIASTOLIC BLOOD PRESSURE: 76 MMHG

## 2023-06-20 DIAGNOSIS — F90.2 ATTENTION DEFICIT HYPERACTIVITY DISORDER (ADHD), COMBINED TYPE: ICD-10-CM

## 2023-06-20 DIAGNOSIS — F31.9 BIPOLAR DISEASE DURING PREGNANCY IN THIRD TRIMESTER: Primary | ICD-10-CM

## 2023-06-20 DIAGNOSIS — O99.343 BIPOLAR DISEASE DURING PREGNANCY IN THIRD TRIMESTER: Primary | ICD-10-CM

## 2023-06-20 PROCEDURE — 99999 PR PBB SHADOW E&M-EST. PATIENT-LVL II: CPT | Mod: PBBFAC,,, | Performed by: PSYCHOLOGIST

## 2023-06-20 PROCEDURE — 90836 PR PSYCHOTHERAPY W/PATIENT W/E&M, 45 MIN (ADD ON): ICD-10-PCS | Mod: S$GLB,,, | Performed by: PSYCHOLOGIST

## 2023-06-20 PROCEDURE — 99214 PR OFFICE/OUTPT VISIT, EST, LEVL IV, 30-39 MIN: ICD-10-PCS | Mod: S$GLB,,, | Performed by: PSYCHOLOGIST

## 2023-06-20 PROCEDURE — 99999 PR PBB SHADOW E&M-EST. PATIENT-LVL II: ICD-10-PCS | Mod: PBBFAC,,, | Performed by: PSYCHOLOGIST

## 2023-06-20 PROCEDURE — 90836 PSYTX W PT W E/M 45 MIN: CPT | Mod: S$GLB,,, | Performed by: PSYCHOLOGIST

## 2023-06-20 PROCEDURE — 99214 OFFICE O/P EST MOD 30 MIN: CPT | Mod: S$GLB,,, | Performed by: PSYCHOLOGIST

## 2023-06-20 RX ORDER — QUETIAPINE FUMARATE 200 MG/1
200 TABLET, FILM COATED ORAL NIGHTLY
Qty: 30 TABLET | Refills: 2 | Status: SHIPPED | OUTPATIENT
Start: 2023-06-20 | End: 2023-08-15 | Stop reason: SDUPTHER

## 2023-06-20 RX ORDER — LAMOTRIGINE 200 MG/1
200 TABLET ORAL EVERY MORNING
Qty: 30 TABLET | Refills: 2
Start: 2023-06-20 | End: 2023-08-15 | Stop reason: SDUPTHER

## 2023-06-20 NOTE — PATIENT INSTRUCTIONS

## 2023-06-20 NOTE — PROGRESS NOTES
"  Outpatient Psychiatry Follow-Up Visit     6/20/2023      Clinical Status of Patient:  Outpatient (Ambulatory)    Chief Complaint:  Iris Johnson is a 28 y.o. female who presents today for follow-up of mood and inattention/distractibility .      Preferred Name: Iris  Gender Identity: cis female  Preferred Pronouns: she/her    Impressions/Plan from last visit: Iris attended her visit. She is taking Seroquel, Lamictal, and prenatal vitamins. She feels fairly stable. Her knee is hurting today (from a past injury). She said that her  has told her that she complains about her "brain being broken" but she is not complaining about it. She relates this to being off of Adderall. She has been off of it since December. She said that she has not been able to focus--is struggling. She often feels "frozen" and "can't get my mind to work right." She said, "I need somebody there to keep me on track." She is currently taking Lamictal 100 mg--will be taking 200 mg next week. She is feeling frustrated and overwhelmed. She does not feel that her  is understanding or helpful. She has talked to a common friend (with her ) about coming over--may also have her mom and best friend over, too, though her best friend has been struggling with her own mental health person. Her  reportedly has strict boundaries/limits with what he will put up with and not with her. Her best friend's mom does not think that her  has been good for her--"she says that I have lost my sense of self." She agrees that she has. She said that he is upset that he is carrying the financial "burden"--she does not feel that she could work with the pregnancy. There are red flags in her relationship--she is not on their bank account, for example.     Plan--continue Seroquel 300 mg hs, Lamictal 200 mg once she starts     Interval History and Content of Current Session: Iris attended her visit. She was a little late--her  had to " "drive. She is not driving because she said that "my brain is not working." Her due date is Sept 13th. She is feeling well with the pregnancy---baby is reportedly moving all the time. She does believe that her mood is stable overall--she is not sleeping well because she thinks that her Seroquel makes her heart race. We discussed decreasing her Seroquel (more sedating at 200 and under). Her  was injured--when they were at the beach. He is on short-term disability with his work. She is not working right now--can't focus at all without a stimulant. They got a new puppy--taking a lot of time. She is trying to rest as much as she can. Her mother-in-law has been around a lot--but she has been having to repeat herself multiple times. Mat is 9--they will be sharing a room when she is able to get Mat. He is mostly with her parents. Her parents are supportive--though she feels very hesitant and has no plans to ever leave the baby alone with them because of what happened with her other son. She and her  have considered getting an  to try and get custody, but her parents reportedly have a lot of money and would put them in debt. Mat is living in [place], [place], with her parents--they have considered moving closer so Mat can be more involved with the baby. She does miss working with her dad and recognizes that her dad notices that she is different now than 10 years ago. She is planning to homeschool her son--she does plan to have him involved in sports and other programs--there are coops, too. She is proud of her planning and thinking ahead. She still has not gotten her name changed--wants limited time alone with her mother-in-law. Now that her  is not working, he can take her to the social security office to change her name. She still has some trouble with her appetite--some days can't eat. See plan below.    Plan--continue Lamictal 200 mg; decrease Seroquel 200 mg hs;  to drive " her to change her name    PSYCHOTHERAPY      Site: [facility]  Time: 38 minutes  Participants: Met with patient    Therapeutic Intervention Type: behavior modifying psychotherapy, supportive psychotherapy  Why chosen therapy is appropriate versus another modality: patient responds to this modality, evidence based practice    Target symptoms: anxiety , mood swings, pregnancy  Primary focus: see above    Outcome monitoring methods: self-report, observation    Patient's response to intervention:  The patient's response to intervention is accepting.    Progress toward goals:  The patient's progress toward goals is fair .    GAD7 3/22/2023 2/14/2023 11/16/2022   1. Feeling nervous, anxious, or on edge? 1 3 1   2. Not being able to stop or control worrying? 1 3 1   3. Worrying too much about different things? 1 3 1   4. Trouble relaxing? 1 3 1   5. Being so restless that it is hard to sit still? 1 3 1   6. Becoming easily annoyed or irritable? 1 3 0   7. Feeling afraid as if something awful might happen? 1 1 1   NOLVIA-7 Score 7 19 6      0-4 = Minimal anxiety  5-9 = Mild anxiety  10-14 = Moderate anxiety  15-21 = Severe anxiety       Depression Patient Health Questionnaire 8/1/2022   Over the last two weeks how often have you been bothered by little interest or pleasure in doing things Several days   Over the last two weeks how often have you been bothered by feeling down, depressed or hopeless Several days   PHQ-2 Total Score 2     0-4 = No intervention  5 to 9 = Mild  10 to 14 = Moderate  15 to 19 = Moderately severe  =20 = Severe    Review of Systems   PSYCHIATRIC: Pertinant items are noted in the narrative.    Past Medical, Family and Social History: The patient's past medical, family and social history have been reviewed and updated as appropriate within the electronic medical record - see encounter notes.      Current Outpatient Medications:     butalbital-acetaminophen-caffeine -40 mg (FIORICET,  "ESGIC) -40 mg per tablet, Take 1 tablet by mouth every 6 (six) hours as needed for Headaches., Disp: 30 tablet, Rfl: 0    lamoTRIgine (LAMICTAL) 200 MG tablet, Take 1 tablet (200 mg total) by mouth every morning., Disp: 30 tablet, Rfl: 2    ondansetron (ZOFRAN) 4 MG tablet, Take 1 tablet (4 mg total) by mouth every 8 (eight) hours as needed for Nausea., Disp: 15 tablet, Rfl: 6    PRENATAL VITAMIN 27 mg iron- 0.8 mg Tab, Take 1 tablet by mouth., Disp: , Rfl:     QUEtiapine (SEROQUEL) 200 MG Tab, Take 1 tablet (200 mg total) by mouth every evening., Disp: 30 tablet, Rfl: 2    Current Facility-Administered Medications:     onabotulinumtoxina injection 200 Units, 200 Units, Intramuscular, q12 weeks, Deb Aguirre, NP, 200 Units at 06/08/23 6977    Compliance: yes    Side effects: see above    Risk Parameters:  Patient reports no suicidal ideation  Patient reports no homicidal ideation  Patient reports no self-injurious behavior  Patient reports no violent behavior    Exam (detailed: at least 9 elements; comprehensive: all 15 elements)   Constitutional  Vitals:  Most recent vital signs were reviewed.   Last 3 sets of Vitals    Vitals - 1 value per visit 6/8/2023 6/8/2023 6/20/2023   SYSTOLIC - 120 115   DIASTOLIC - 78 76   Pulse - 76 76   Temp - - -   Resp - 17 -   SPO2 - - -   Weight (lb) - 125.22 122.8   Weight (kg) - 56.8 55.7   Height - 60 -   BMI (Calculated) - 24.5 -   VISIT REPORT - - -   Pain Score  0 - -          General:  age appropriate, casually dressed, neatly groomed, hair pulled up     Musculoskeletal  Muscle Strength/Tone:  no tremor, no tic   Gait & Station:  non-ataxic     Psychiatric  Speech:  no latency; no press   Behavior: wnl   Mood & Affect:  anxious, depressed, "spinning like it's been for the last 6 months"  labile   Thought Process:  normal and logical   Associations:  intact   Thought Content:  normal, no suicidality, no homicidality, delusions, or paranoia   Insight:  has awareness " of illness   Judgement: behavior is adequate to circumstances   Orientation:  grossly intact   Memory: intact for content of interview   Language: grossly intact   Attention Span & Concentration:  Grossly intact   Fund of Knowledge:  intact and appropriate to age and level of education     Assessment and Diagnosis   Status/Progress: Based on the examination today, the patient's problem(s) is/are adequately but not ideally controlled.  New problems have not been presented today.   Co-morbidities and psychosocial stressors  are complicating management of the primary condition.  There are no active rule-out diagnoses for this patient at this time.     General Impression:     Encounter Diagnoses   Name Primary?    Bipolar disease during pregnancy in third trimester Yes    Attention deficit hyperactivity disorder (ADHD), combined type          Intervention/Counseling/Treatment Plan   Medication Management: Discussed risks, benefits, and alternatives to treatment plan documented above with patient. I answered all patient questions related to this plan, and patient expressed understanding and agreement.   continue Lamictal 200 mg; decrease Seroquel 200 mg hs  Counseling needed but restricted by time/resources--will follow monthly   to drive her to change her name      Return to Clinic: as scheduled    Medication List with Changes/Refills   Current Medications    BUTALBITAL-ACETAMINOPHEN-CAFFEINE -40 MG (FIORICET, ESGIC) -40 MG PER TABLET    Take 1 tablet by mouth every 6 (six) hours as needed for Headaches.    ONDANSETRON (ZOFRAN) 4 MG TABLET    Take 1 tablet (4 mg total) by mouth every 8 (eight) hours as needed for Nausea.    PRENATAL VITAMIN 27 MG IRON- 0.8 MG TAB    Take 1 tablet by mouth.   Changed and/or Refilled Medications    Modified Medication Previous Medication    LAMOTRIGINE (LAMICTAL) 200 MG TABLET lamoTRIgine (LAMICTAL) 200 MG tablet       Take 1 tablet (200 mg total) by mouth every  morning.    Take 1 tablet (200 mg total) by mouth every morning.    QUETIAPINE (SEROQUEL) 200 MG TAB QUEtiapine (SEROQUEL) 300 MG Tab       Take 1 tablet (200 mg total) by mouth every evening.    Take 1 tablet (300 mg total) by mouth every evening.        I spent an additional 10 minutes performing E/M services with >50% spent on counseling, guidance, coordinating care (not Psychotherapy related) in addition to the 38 minutes performing Psychotherapy.    Time spent with pt including note preparation: 48 minutes     Tiffanie Galvez, PhD, MP  Advanced Practice Medical Psychologist  [facility]  [id number] [place] [address].  TATIANA Ziegler [id number]  [phone]   [phone] fax

## 2023-06-21 ENCOUNTER — PATIENT MESSAGE (OUTPATIENT)
Dept: OTHER | Facility: OTHER | Age: 28
End: 2023-06-21
Payer: COMMERCIAL

## 2023-06-22 ENCOUNTER — PROCEDURE VISIT (OUTPATIENT)
Dept: OBSTETRICS AND GYNECOLOGY | Facility: CLINIC | Age: 28
End: 2023-06-22
Payer: COMMERCIAL

## 2023-06-22 ENCOUNTER — ROUTINE PRENATAL (OUTPATIENT)
Dept: OBSTETRICS AND GYNECOLOGY | Facility: CLINIC | Age: 28
End: 2023-06-22
Payer: COMMERCIAL

## 2023-06-22 ENCOUNTER — LAB VISIT (OUTPATIENT)
Dept: LAB | Facility: HOSPITAL | Age: 28
End: 2023-06-22
Attending: ADVANCED PRACTICE MIDWIFE
Payer: COMMERCIAL

## 2023-06-22 VITALS
DIASTOLIC BLOOD PRESSURE: 60 MMHG | WEIGHT: 121.94 LBS | BODY MASS INDEX: 23.81 KG/M2 | SYSTOLIC BLOOD PRESSURE: 122 MMHG

## 2023-06-22 DIAGNOSIS — Z36.2 ENCOUNTER FOR FOLLOW-UP ULTRASOUND OF FETAL ANATOMY: ICD-10-CM

## 2023-06-22 DIAGNOSIS — F19.11 HX OF DRUG ABUSE: ICD-10-CM

## 2023-06-22 DIAGNOSIS — R10.11 RUQ PAIN: ICD-10-CM

## 2023-06-22 DIAGNOSIS — Z34.82 ENCOUNTER FOR SUPERVISION OF OTHER NORMAL PREGNANCY IN SECOND TRIMESTER: ICD-10-CM

## 2023-06-22 DIAGNOSIS — G47.00 INSOMNIA, UNSPECIFIED TYPE: ICD-10-CM

## 2023-06-22 DIAGNOSIS — F31.9 BIPOLAR AFFECTIVE DISORDER, REMISSION STATUS UNSPECIFIED: ICD-10-CM

## 2023-06-22 DIAGNOSIS — Z34.83 ENCOUNTER FOR SUPERVISION OF OTHER NORMAL PREGNANCY IN THIRD TRIMESTER: Primary | ICD-10-CM

## 2023-06-22 DIAGNOSIS — Z23 NEED FOR DIPHTHERIA-TETANUS-PERTUSSIS (TDAP) VACCINE: ICD-10-CM

## 2023-06-22 PROBLEM — Z34.93 ENCOUNTER FOR SUPERVISION OF NORMAL PREGNANCY IN THIRD TRIMESTER: Status: ACTIVE | Noted: 2023-06-07

## 2023-06-22 PROBLEM — F19.91 HX OF INTRAVENOUS DRUG USE IN REMISSION: Status: ACTIVE | Noted: 2023-06-22

## 2023-06-22 PROBLEM — Z87.898 HX OF INTRAVENOUS DRUG USE IN REMISSION: Status: ACTIVE | Noted: 2023-06-22

## 2023-06-22 LAB
ALBUMIN SERPL BCP-MCNC: 3.3 G/DL (ref 3.5–5.2)
ALP SERPL-CCNC: 73 U/L (ref 55–135)
ALT SERPL W/O P-5'-P-CCNC: 11 U/L (ref 10–44)
AMPHET+METHAMPHET UR QL: NEGATIVE
ANION GAP SERPL CALC-SCNC: 11 MMOL/L (ref 8–16)
AST SERPL-CCNC: 23 U/L (ref 10–40)
BARBITURATES UR QL SCN>200 NG/ML: NEGATIVE
BASOPHILS # BLD AUTO: 0.04 K/UL (ref 0–0.2)
BASOPHILS NFR BLD: 0.5 % (ref 0–1.9)
BENZODIAZ UR QL SCN>200 NG/ML: NEGATIVE
BILIRUB SERPL-MCNC: 0.2 MG/DL (ref 0.1–1)
BUN SERPL-MCNC: 3 MG/DL (ref 6–20)
BZE UR QL SCN: NEGATIVE
CALCIUM SERPL-MCNC: 8.9 MG/DL (ref 8.7–10.5)
CANNABINOIDS UR QL SCN: ABNORMAL
CHLORIDE SERPL-SCNC: 105 MMOL/L (ref 95–110)
CO2 SERPL-SCNC: 23 MMOL/L (ref 23–29)
CREAT SERPL-MCNC: 0.8 MG/DL (ref 0.5–1.4)
CREAT UR-MCNC: 29 MG/DL (ref 15–325)
CREAT UR-MCNC: 29 MG/DL (ref 15–325)
DIFFERENTIAL METHOD: ABNORMAL
EOSINOPHIL # BLD AUTO: 0 K/UL (ref 0–0.5)
EOSINOPHIL NFR BLD: 0.5 % (ref 0–8)
ERYTHROCYTE [DISTWIDTH] IN BLOOD BY AUTOMATED COUNT: 13 % (ref 11.5–14.5)
EST. GFR  (NO RACE VARIABLE): >60 ML/MIN/1.73 M^2
ETHANOL UR-MCNC: <10 MG/DL
GLUCOSE SERPL-MCNC: 106 MG/DL (ref 70–110)
GLUCOSE SERPL-MCNC: 109 MG/DL (ref 70–140)
HCT VFR BLD AUTO: 32.9 % (ref 37–48.5)
HGB BLD-MCNC: 11 G/DL (ref 12–16)
HIV 1+2 AB+HIV1 P24 AG SERPL QL IA: NORMAL
IMM GRANULOCYTES # BLD AUTO: 0.04 K/UL (ref 0–0.04)
IMM GRANULOCYTES NFR BLD AUTO: 0.5 % (ref 0–0.5)
LYMPHOCYTES # BLD AUTO: 1.9 K/UL (ref 1–4.8)
LYMPHOCYTES NFR BLD: 24.1 % (ref 18–48)
MCH RBC QN AUTO: 32.5 PG (ref 27–31)
MCHC RBC AUTO-ENTMCNC: 33.4 G/DL (ref 32–36)
MCV RBC AUTO: 97 FL (ref 82–98)
METHADONE UR QL SCN>300 NG/ML: NEGATIVE
MONOCYTES # BLD AUTO: 0.5 K/UL (ref 0.3–1)
MONOCYTES NFR BLD: 6.2 % (ref 4–15)
NEUTROPHILS # BLD AUTO: 5.3 K/UL (ref 1.8–7.7)
NEUTROPHILS NFR BLD: 68.2 % (ref 38–73)
NRBC BLD-RTO: 0 /100 WBC
OPIATES UR QL SCN: NEGATIVE
PCP UR QL SCN>25 NG/ML: NEGATIVE
PLATELET # BLD AUTO: 224 K/UL (ref 150–450)
PMV BLD AUTO: 11.4 FL (ref 9.2–12.9)
POTASSIUM SERPL-SCNC: 3.8 MMOL/L (ref 3.5–5.1)
PROT SERPL-MCNC: 6.6 G/DL (ref 6–8.4)
PROT UR-MCNC: <7 MG/DL (ref 0–15)
PROT/CREAT UR: NORMAL MG/G{CREAT} (ref 0–0.2)
RBC # BLD AUTO: 3.38 M/UL (ref 4–5.4)
SODIUM SERPL-SCNC: 139 MMOL/L (ref 136–145)
TOXICOLOGY INFORMATION: ABNORMAL
WBC # BLD AUTO: 7.79 K/UL (ref 3.9–12.7)

## 2023-06-22 PROCEDURE — 76816 OB US FOLLOW-UP PER FETUS: CPT | Mod: S$GLB,,, | Performed by: OBSTETRICS & GYNECOLOGY

## 2023-06-22 PROCEDURE — 0502F PR SUBSEQUENT PRENATAL CARE: ICD-10-PCS | Mod: S$GLB,,, | Performed by: ADVANCED PRACTICE MIDWIFE

## 2023-06-22 PROCEDURE — 99999 PR PBB SHADOW E&M-EST. PATIENT-LVL III: CPT | Mod: PBBFAC,,, | Performed by: ADVANCED PRACTICE MIDWIFE

## 2023-06-22 PROCEDURE — 90471 IMMUNIZATION ADMIN: CPT | Mod: S$GLB,,, | Performed by: OBSTETRICS & GYNECOLOGY

## 2023-06-22 PROCEDURE — 87389 HIV-1 AG W/HIV-1&-2 AB AG IA: CPT | Performed by: ADVANCED PRACTICE MIDWIFE

## 2023-06-22 PROCEDURE — 0502F SUBSEQUENT PRENATAL CARE: CPT | Mod: S$GLB,,, | Performed by: ADVANCED PRACTICE MIDWIFE

## 2023-06-22 PROCEDURE — 76816 US OB/GYN PROCEDURE (VIEWPOINT): ICD-10-PCS | Mod: S$GLB,,, | Performed by: OBSTETRICS & GYNECOLOGY

## 2023-06-22 PROCEDURE — 90715 TDAP VACCINE GREATER THAN OR EQUAL TO 7YO IM: ICD-10-PCS | Mod: S$GLB,,, | Performed by: OBSTETRICS & GYNECOLOGY

## 2023-06-22 PROCEDURE — 99999 PR PBB SHADOW E&M-EST. PATIENT-LVL III: ICD-10-PCS | Mod: PBBFAC,,, | Performed by: ADVANCED PRACTICE MIDWIFE

## 2023-06-22 PROCEDURE — 80053 COMPREHEN METABOLIC PANEL: CPT | Performed by: ADVANCED PRACTICE MIDWIFE

## 2023-06-22 PROCEDURE — 82950 GLUCOSE TEST: CPT | Performed by: ADVANCED PRACTICE MIDWIFE

## 2023-06-22 PROCEDURE — 85025 COMPLETE CBC W/AUTO DIFF WBC: CPT | Performed by: ADVANCED PRACTICE MIDWIFE

## 2023-06-22 PROCEDURE — 84156 ASSAY OF PROTEIN URINE: CPT | Mod: 59 | Performed by: ADVANCED PRACTICE MIDWIFE

## 2023-06-22 PROCEDURE — 90715 TDAP VACCINE 7 YRS/> IM: CPT | Mod: S$GLB,,, | Performed by: OBSTETRICS & GYNECOLOGY

## 2023-06-22 PROCEDURE — 90471 TDAP VACCINE GREATER THAN OR EQUAL TO 7YO IM: ICD-10-PCS | Mod: S$GLB,,, | Performed by: OBSTETRICS & GYNECOLOGY

## 2023-06-22 PROCEDURE — 80307 DRUG TEST PRSMV CHEM ANLYZR: CPT | Performed by: ADVANCED PRACTICE MIDWIFE

## 2023-06-22 PROCEDURE — 36415 COLL VENOUS BLD VENIPUNCTURE: CPT | Mod: PO | Performed by: ADVANCED PRACTICE MIDWIFE

## 2023-06-22 PROCEDURE — 86592 SYPHILIS TEST NON-TREP QUAL: CPT | Performed by: ADVANCED PRACTICE MIDWIFE

## 2023-06-22 NOTE — PROGRESS NOTES
28 y.o. female  at 28w1d   Reports + FM, denies VB, LOF or CTX  Reports still having insomnia, met with psych and seroquel was decreased to 200mg, still taking lamictal, got botox injections for migraines and seems to help with quantity and quality  Is having RUQ pain, will perform baseline PIH workup   /60   Wt 55.3 kg (121 lb 14.6 oz)   LMP  (LMP Unknown)   BMI 23.81 kg/m²   TW lbs   28wk labs today (A POS)  Tdap today  Will start visits every 2 weeks    Encounter for supervision of other normal pregnancy in third trimester  -     Toxicology screen, urine    Need for diphtheria-tetanus-pertussis (Tdap) vaccine  -     (In Office Administered) Tdap Vaccine    RUQ pain  -     Protein / creatinine ratio, urine  -     Comprehensive Metabolic Panel; Future; Expected date: 2023    Bipolar affective disorder, remission status unspecified    Insomnia, unspecified type    Hx of drug abuse    Ultrasound today with posterior placenta, 3VC, TY WNL, MVP 6.8cm, EFW 34%, 2lb 6oz, anatomy complete, reviewed with pt, for MFM to review  Reviewed warning signs, normal FKCs,  labor precautions and how/when to call.  RTC x 2 wks, call or present sooner prn.

## 2023-06-23 LAB — RPR SER QL: NORMAL

## 2023-07-05 ENCOUNTER — PATIENT MESSAGE (OUTPATIENT)
Dept: OTHER | Facility: OTHER | Age: 28
End: 2023-07-05
Payer: COMMERCIAL

## 2023-07-13 ENCOUNTER — ROUTINE PRENATAL (OUTPATIENT)
Dept: OBSTETRICS AND GYNECOLOGY | Facility: CLINIC | Age: 28
End: 2023-07-13
Payer: COMMERCIAL

## 2023-07-13 VITALS
WEIGHT: 126.56 LBS | DIASTOLIC BLOOD PRESSURE: 72 MMHG | BODY MASS INDEX: 24.71 KG/M2 | SYSTOLIC BLOOD PRESSURE: 128 MMHG

## 2023-07-13 DIAGNOSIS — Z34.83 ENCOUNTER FOR SUPERVISION OF OTHER NORMAL PREGNANCY IN THIRD TRIMESTER: Primary | ICD-10-CM

## 2023-07-13 DIAGNOSIS — Z36.89 ENCOUNTER FOR ULTRASOUND TO ASSESS FETAL GROWTH: ICD-10-CM

## 2023-07-13 PROCEDURE — 99999 PR PBB SHADOW E&M-EST. PATIENT-LVL III: CPT | Mod: PBBFAC,,, | Performed by: ADVANCED PRACTICE MIDWIFE

## 2023-07-13 PROCEDURE — 0502F SUBSEQUENT PRENATAL CARE: CPT | Mod: S$GLB,,, | Performed by: ADVANCED PRACTICE MIDWIFE

## 2023-07-13 PROCEDURE — 99999 PR PBB SHADOW E&M-EST. PATIENT-LVL III: ICD-10-PCS | Mod: PBBFAC,,, | Performed by: ADVANCED PRACTICE MIDWIFE

## 2023-07-13 PROCEDURE — 0502F PR SUBSEQUENT PRENATAL CARE: ICD-10-PCS | Mod: S$GLB,,, | Performed by: ADVANCED PRACTICE MIDWIFE

## 2023-07-13 NOTE — PROGRESS NOTES
28 y.o. female  at 31w1d   Reports + FM, denies VB, LOF or CTX  Doing well without concerns   /72   Wt 57.4 kg (126 lb 8.7 oz)   LMP  (LMP Unknown)   BMI 24.71 kg/m²   TW lbs   Reviewed 28wk lab results (A POS)  Tdap given 23    Encounter for supervision of other normal pregnancy in third trimester    Reviewed warning signs, normal FKCs,  labor precautions and how/when to call.  RTC x 2 wks, call or present sooner prn.

## 2023-07-19 ENCOUNTER — PATIENT MESSAGE (OUTPATIENT)
Dept: OTHER | Facility: OTHER | Age: 28
End: 2023-07-19
Payer: COMMERCIAL

## 2023-07-20 ENCOUNTER — NURSE TRIAGE (OUTPATIENT)
Dept: ADMINISTRATIVE | Facility: CLINIC | Age: 28
End: 2023-07-20
Payer: COMMERCIAL

## 2023-07-20 ENCOUNTER — OFFICE VISIT (OUTPATIENT)
Dept: PRIMARY CARE CLINIC | Facility: CLINIC | Age: 28
End: 2023-07-20
Payer: COMMERCIAL

## 2023-07-20 DIAGNOSIS — L24.7 IRRITANT CONTACT DERMATITIS DUE TO PLANTS, EXCEPT FOOD: Primary | ICD-10-CM

## 2023-07-20 PROBLEM — F19.11 HX OF DRUG ABUSE: Status: RESOLVED | Noted: 2023-06-22 | Resolved: 2023-07-20

## 2023-07-20 PROCEDURE — 99214 PR OFFICE/OUTPT VISIT, EST, LEVL IV, 30-39 MIN: ICD-10-PCS | Mod: 95,,, | Performed by: FAMILY MEDICINE

## 2023-07-20 PROCEDURE — 99214 OFFICE O/P EST MOD 30 MIN: CPT | Mod: 95,,, | Performed by: FAMILY MEDICINE

## 2023-07-20 RX ORDER — TRIAMCINOLONE ACETONIDE 1 MG/G
CREAM TOPICAL 2 TIMES DAILY PRN
Qty: 45 G | Refills: 1 | Status: SHIPPED | OUTPATIENT
Start: 2023-07-20 | End: 2023-08-15

## 2023-07-20 RX ORDER — PREDNISONE 20 MG/1
TABLET ORAL
Qty: 19 TABLET | Refills: 0 | Status: SHIPPED | OUTPATIENT
Start: 2023-07-20 | End: 2023-08-15

## 2023-07-20 NOTE — PATIENT INSTRUCTIONS
Sounds like your dog may have gotten into the poison ivy.  Often, the oil gets on their fur, then when they jump in your lap it spreads it to you.    Try taking my prednisone tablets, as directed.      You can supplement with an OTC Claritin or Zyrtec in the morning, OTC Pepcid or Zantac in the evening.      On specific, red, itchy areas, try using my triamcinolone cream.  Use a small amount and rub it in for 10-15 seconds.  Do not use on the face, nor the genitals.    If you get no relief with my medications, or if it worsens, please let me know.  We can always get you in with Dermatology to take another look.

## 2023-07-20 NOTE — TELEPHONE ENCOUNTER
I have this pt calling into nurse on call she is 32 weeks pregnant and battling poison ivy she said that she has tried every cream otc and is wanting to see if steroid shot or meds can be taken with preg, Im trying to see if can get appt with PCP or does she come see CNM since preg.... Pt told that I will reach out to CNM and get back to her as to what she needs to do for care. Care advice to go to office now. Got appt with provider in an hour and will take it. Appt scheduled                   Reason for Disposition   Rash involves more than 20% of the body    Additional Information   Negative: Difficulty breathing or severe coughing following exposure to burning weeds   Negative: Patient sounds very sick or weak to the triager   Negative: Severe poison ivy, oak, or sumac reaction in the past, and face or genitals involved    Protocols used: Poison Ivy - Oak - Sumac-A-[place]

## 2023-07-23 NOTE — PROGRESS NOTES
"    [facility]       [address] [place] [address]      Phone: [phone]      Fax: [phone]    Gurinder Perez MD                Video Visit  07/20/2023        Subjective      HPI:  Iris Johnson is a 28 y.o. female presents today via video for "No chief complaint on file.  ."     28-year-old female presents today via video visit complaining of rash.    Patient states that symptoms started last Friday, about six days ago.  Thinks she might have poison ivy.  Rash on both of her arms, neck, chest, right shoulder.  Also on face, ears.  Very itchy.  More spots keep popping up.  Has taken multiple OTC medications, but none of them have provided any relief.      States that she did not get into poison ivy, herself, but thinks her dog did.  She knows there is poison ivy on the property.    PMH:  ADHD, bipolar, insomnia, depression, OCD, panic attacks, migraines.  PSH:  Nose.  Tonsils.  [place] teeth.  F MH:  Skin cancer.  Breast cancer.  CAD.  HTN.  Bipolar.  Alcohol abuse.  Allergies:  Sulfa drugs cause hives  Social:  Not currently working.  T:  Denies  A:  Denies  D:  THC-has medical marijuana card.  Smokes.    Exercise:  No regular exercise program.  Lots of physical activity at work.    Gyn:  Currently seeing Ms. Cowart, midwife.    Pap:  11/22/2021.  Repeat 3 years (2024)      Rash  This is a new problem. The current episode started in the past 7 days. The problem has been gradually worsening since onset. The affected locations include the face, neck, chest, left arm, left elbow, right shoulder, right arm and right elbow. The rash is characterized by blistering, burning, pain, redness and itchiness. It is unknown if there was an exposure to a precipitant. Pertinent negatives include no anorexia, congestion, cough, diarrhea, eye pain, facial edema, fatigue, fever, joint pain, nail changes, rhinorrhea, shortness of breath, sore throat or vomiting. Past " treatments include anti-itch cream, antibiotic cream and cold compress. The treatment provided mild relief. Her past medical history is significant for asthma and varicella. There is no history of allergies or eczema.     The following were updated and reviewed by myself in the chart: medications, past medical history, past surgical history, family history, social history, and allergies.     Medications:  Current Outpatient Medications on File Prior to Visit   Medication Sig Dispense Refill    lamoTRIgine (LAMICTAL) 200 MG tablet Take 1 tablet (200 mg total) by mouth every morning. 30 tablet 2    PRENATAL VITAMIN 27 mg iron- 0.8 mg Tab Take 1 tablet by mouth.      QUEtiapine (SEROQUEL) 200 MG Tab Take 1 tablet (200 mg total) by mouth every evening. 30 tablet 2     Current Facility-Administered Medications on File Prior to Visit   Medication Dose Route Frequency Provider Last Rate Last Admin    onabotulinumtoxina injection 200 Units  200 Units Intramuscular q12 weeks Deb Aguirre, NP   200 Units at 06/08/23 1517        PMHx:  Past Medical History:   Diagnosis Date    ADHD (attention deficit hyperactivity disorder)     Anemia     ASCUS of cervix with negative high risk HPV 2/1/2023    Bipolar disorder     Chicken pox     childhood    Headache     Heart palpitations     Hypotension     Insomnia     Migraine     Postpartum depression     Substance abuse     prior use, no current use    Syncope 2009    admitted x 1    Syncope       Patient Active Problem List    Diagnosis Date Noted    Encounter for supervision of normal pregnancy in third trimester 06/07/2023    ASCUS of cervix with negative high risk HPV 02/01/2023    Anxiety 08/03/2022    Attention deficit hyperactivity disorder (ADHD), predominantly inattentive type 08/03/2022    Bipolar disorder 08/03/2022    Insomnia 08/03/2022    Migraine 08/03/2022    Intractable chronic migraine without aura and without status migrainosus 12/27/2021    CTS (carpal tunnel  syndrome) 12/14/2021        PSHx:  Past Surgical History:   Procedure Laterality Date    dental extraction (2013)  2013    RHINOPLASTY  2010    TONSILLECTOMY          FHx:  Family History   Problem Relation Age of Onset    Hypertension Paternal Grandfather     Hyperlipidemia Paternal Grandfather     Heart disease Paternal Grandfather     Heart block Paternal Grandfather     Cancer Paternal Grandfather     Hypertension Paternal Grandmother     Hyperlipidemia Paternal Grandmother     Breast cancer Paternal Grandmother 55    Asthma Paternal Grandmother     Depression Paternal Grandmother     Arthritis Paternal Grandmother     Cancer Paternal Grandmother     Stroke Paternal Grandmother     Hyperlipidemia Father     Breast cancer Mother     Skin cancer Mother     Bipolar disorder Mother     Cervical cancer Mother     Cancer Mother     Mental illness Mother     Ovarian cancer Neg Hx     Colon cancer Neg Hx         Social:  Social History     Socioeconomic History    Marital status:     Number of children: 1   Occupational History    Occupation: construction     Comment: history of waitressing   Tobacco Use    Smoking status: Former     Types: Vaping with nicotine    Smokeless tobacco: Never   Substance and Sexual Activity    Alcohol use: Not Currently     Comment: 1-2 drinks lper week    Drug use: Not Currently     Types: Amphetamines, Marijuana     Comment: narcotics, ecstacy (all prior use)    Sexual activity: Yes     Partners: Male     Birth control/protection: None     Comment: birth control pill        Allergies:  Review of patient's allergies indicates:   Allergen Reactions    Bactrim [sulfamethoxazole-trimethoprim]     Sulfa (sulfonamide antibiotics)         ROS:  Review of Systems   Constitutional:  Negative for activity change, appetite change, chills, fatigue and fever.   HENT:  Negative for congestion, postnasal drip, rhinorrhea, sore throat and trouble swallowing.    Eyes:  Negative for pain.    Respiratory:  Negative for cough, shortness of breath and wheezing.    Cardiovascular:  Negative for chest pain and palpitations.   Gastrointestinal:  Negative for abdominal pain, anorexia, constipation, diarrhea, nausea and vomiting.   Genitourinary:  Negative for difficulty urinating.   Musculoskeletal:  Negative for arthralgias, joint pain and myalgias.   Skin:  Positive for rash. Negative for color change and nail changes.   Neurological:  Negative for speech difficulty and headaches.   All other systems reviewed and are negative.       Objective      LMP  (LMP Unknown)   Ht Readings from Last 3 Encounters:   06/08/23 5' (1.524 m)   03/06/23 5' (1.524 m)   01/19/23 5' (1.524 m)     Wt Readings from Last 3 Encounters:   07/13/23 57.4 kg (126 lb 8.7 oz)   06/22/23 55.3 kg (121 lb 14.6 oz)   06/20/23 55.7 kg (122 lb 12.7 oz)       PHYSICAL EXAM:  Physical Exam  Constitutional:       General: She is not in acute distress.     Appearance: Normal appearance. She is not ill-appearing.   Pulmonary:      Effort: Pulmonary effort is normal. No respiratory distress.   Neurological:      Mental Status: She is alert.   Psychiatric:         Mood and Affect: Mood normal.         Behavior: Behavior normal.         Thought Content: Thought content normal.            LABS / IMAGING:  Recent Results (from the past 4368 hour(s))   Protein / creatinine ratio, urine    Collection Time: 06/22/23 10:22 AM   Result Value Ref Range    Protein, Urine Random <7 0 - 15 mg/dL    Creatinine, Urine 29.0 15.0 - 325.0 mg/dL    Prot/Creat Ratio, Urine Unable to calculate 0.00 - 0.20   Toxicology screen, urine    Collection Time: 06/22/23 10:22 AM   Result Value Ref Range    Alcohol, Urine <10 <10 mg/dL    Benzodiazepines Negative Negative    Methadone metabolites Negative Negative    Cocaine (Metab.) Negative Negative    Opiate Scrn, Ur Negative Negative    Barbiturate Screen, Ur Negative Negative    Amphetamine Screen, Ur Negative Negative     THC Presumptive Positive (A) Negative    Phencyclidine Negative Negative    Creatinine, Urine 29.0 15.0 - 325.0 mg/dL    Toxicology Information SEE COMMENT    OB Glucose Screen    Collection Time: 06/22/23 10:39 AM   Result Value Ref Range    OB Glucose Screen 109 70 - 140 mg/dL   CBC auto differential    Collection Time: 06/22/23 10:39 AM   Result Value Ref Range    WBC 7.79 3.90 - 12.70 K/uL    RBC 3.38 (L) 4.00 - 5.40 M/uL    Hemoglobin 11.0 (L) 12.0 - 16.0 g/dL    Hematocrit 32.9 (L) 37.0 - 48.5 %    MCV 97 82 - 98 fL    MCH 32.5 (H) 27.0 - 31.0 pg    MCHC 33.4 32.0 - 36.0 g/dL    RDW 13.0 11.5 - 14.5 %    Platelets 224 150 - 450 K/uL    MPV 11.4 9.2 - 12.9 fL    Immature Granulocytes 0.5 0.0 - 0.5 %    Gran # (ANC) 5.3 1.8 - 7.7 K/uL    Immature Grans (Abs) 0.04 0.00 - 0.04 K/uL    Lymph # 1.9 1.0 - 4.8 K/uL    Mono # 0.5 0.3 - 1.0 K/uL    Eos # 0.0 0.0 - 0.5 K/uL    Baso # 0.04 0.00 - 0.20 K/uL    nRBC 0 0 /100 WBC    Gran % 68.2 38.0 - 73.0 %    Lymph % 24.1 18.0 - 48.0 %    Mono % 6.2 4.0 - 15.0 %    Eosinophil % 0.5 0.0 - 8.0 %    Basophil % 0.5 0.0 - 1.9 %    Differential Method Automated    HIV 1/2 Ag/Ab (4th Gen)    Collection Time: 06/22/23 10:39 AM   Result Value Ref Range    HIV 1/2 Ag/Ab Non-reactive Non-reactive   RPR    Collection Time: 06/22/23 10:39 AM   Result Value Ref Range    RPR Non-reactive Non-reactive   Comprehensive Metabolic Panel    Collection Time: 06/22/23 10:39 AM   Result Value Ref Range    Sodium 139 136 - 145 mmol/L    Potassium 3.8 3.5 - 5.1 mmol/L    Chloride 105 95 - 110 mmol/L    CO2 23 23 - 29 mmol/L    Glucose 106 70 - 110 mg/dL    BUN 3 (L) 6 - 20 mg/dL    Creatinine 0.8 0.5 - 1.4 mg/dL    Calcium 8.9 8.7 - 10.5 mg/dL    Total Protein 6.6 6.0 - 8.4 g/dL    Albumin 3.3 (L) 3.5 - 5.2 g/dL    Total Bilirubin 0.2 0.1 - 1.0 mg/dL    Alkaline Phosphatase 73 55 - 135 U/L    AST 23 10 - 40 U/L    ALT 11 10 - 44 U/L    eGFR >60.0 >60 mL/min/1.73 m^2    Anion Gap 11 8 - 16 mmol/L          Assessment    1. Irritant contact dermatitis due to plants, except food          Plan    Diagnoses and all orders for this visit:    Irritant contact dermatitis due to plants, except food  -     predniSONE (DELTASONE) 20 MG tablet; Take 3 tablets (60 mg total) by mouth once daily for 3 days, THEN 2 tablets (40 mg total) once daily for 3 days, THEN 1 tablet (20 mg total) once daily for 3 days, THEN 0.5 tablets (10 mg total) once daily for 2 days.  -     triamcinolone acetonide 0.1% (KENALOG) 0.1 % cream; Apply topically 2 (two) times daily as needed (itching).          FOLLOW-UP:  Follow up if symptoms worsen or fail to improve.    The patient location is: [place]  The chief complaint leading to consultation is: rash    Visit type: audiovisual    Face to Face time with patient: 15 minutes    35 minutes of total time spent on the encounter, which includes face to face time and non-face to face time preparing to see the patient (eg, review of tests), Obtaining and/or reviewing separately obtained history, Documenting clinical information in the electronic or other health record, Independently interpreting results (not separately reported) and communicating results to the patient/family/caregiver, or Care coordination (not separately reported).     Each patient to whom he or she provides medical services by telemedicine is:  (1) informed of the relationship between the physician and patient and the respective role of any other health care provider with respect to management of the patient; and (2) notified that he or she may decline to receive medical services by telemedicine and may withdraw from such care at any time.    Signed by:  Gurinder Perez MD

## 2023-08-03 ENCOUNTER — ROUTINE PRENATAL (OUTPATIENT)
Dept: OBSTETRICS AND GYNECOLOGY | Facility: CLINIC | Age: 28
End: 2023-08-03
Payer: COMMERCIAL

## 2023-08-03 VITALS
SYSTOLIC BLOOD PRESSURE: 128 MMHG | BODY MASS INDEX: 25.66 KG/M2 | WEIGHT: 131.38 LBS | DIASTOLIC BLOOD PRESSURE: 68 MMHG

## 2023-08-03 DIAGNOSIS — F90.0 ATTENTION DEFICIT HYPERACTIVITY DISORDER (ADHD), PREDOMINANTLY INATTENTIVE TYPE: ICD-10-CM

## 2023-08-03 DIAGNOSIS — G43.719 INTRACTABLE CHRONIC MIGRAINE WITHOUT AURA AND WITHOUT STATUS MIGRAINOSUS: ICD-10-CM

## 2023-08-03 DIAGNOSIS — Z34.83 ENCOUNTER FOR SUPERVISION OF OTHER NORMAL PREGNANCY IN THIRD TRIMESTER: Primary | ICD-10-CM

## 2023-08-03 DIAGNOSIS — Z36.89 ENCOUNTER FOR ULTRASOUND TO ASSESS FETAL GROWTH: ICD-10-CM

## 2023-08-03 DIAGNOSIS — F31.9 BIPOLAR AFFECTIVE DISORDER, REMISSION STATUS UNSPECIFIED: ICD-10-CM

## 2023-08-03 PROCEDURE — 99999 PR PBB SHADOW E&M-EST. PATIENT-LVL III: ICD-10-PCS | Mod: PBBFAC,,, | Performed by: ADVANCED PRACTICE MIDWIFE

## 2023-08-03 PROCEDURE — 0502F PR SUBSEQUENT PRENATAL CARE: ICD-10-PCS | Mod: S$GLB,,, | Performed by: ADVANCED PRACTICE MIDWIFE

## 2023-08-03 PROCEDURE — 99999 PR PBB SHADOW E&M-EST. PATIENT-LVL III: CPT | Mod: PBBFAC,,, | Performed by: ADVANCED PRACTICE MIDWIFE

## 2023-08-03 PROCEDURE — 0502F SUBSEQUENT PRENATAL CARE: CPT | Mod: S$GLB,,, | Performed by: ADVANCED PRACTICE MIDWIFE

## 2023-08-03 NOTE — PROGRESS NOTES
28 y.o. female  at 34w1d   Reports + FM, denies VB, LOF or regular CTX  Doing well without concerns   /68   Wt 59.6 kg (131 lb 6.3 oz)   LMP  (LMP Unknown)   BMI 25.66 kg/m²   TW lbs   GBS reviewed, will collect at [facility]    Encounter for supervision of other normal pregnancy in third trimester    Intractable chronic migraine without aura and without status migrainosus    Attention deficit hyperactivity disorder (ADHD), predominantly inattentive type    Bipolar affective disorder, remission status unspecified    Encounter for ultrasound to assess fetal growth  -     US OB/GYN Procedure ([facility])-Future; Future    Fetal growth scan at [facility]  Reviewed warning signs, normal FKCs, labor precautions and how/when to call.  RTC x 2 wks, call or present sooner prn.

## 2023-08-09 ENCOUNTER — PATIENT MESSAGE (OUTPATIENT)
Dept: OTHER | Facility: OTHER | Age: 28
End: 2023-08-09
Payer: COMMERCIAL

## 2023-08-15 ENCOUNTER — OFFICE VISIT (OUTPATIENT)
Dept: PSYCHIATRY | Facility: CLINIC | Age: 28
End: 2023-08-15
Payer: COMMERCIAL

## 2023-08-15 VITALS
WEIGHT: 135.13 LBS | SYSTOLIC BLOOD PRESSURE: 115 MMHG | HEART RATE: 101 BPM | DIASTOLIC BLOOD PRESSURE: 82 MMHG | BODY MASS INDEX: 26.39 KG/M2

## 2023-08-15 DIAGNOSIS — O99.343 BIPOLAR DISEASE DURING PREGNANCY IN THIRD TRIMESTER: Primary | ICD-10-CM

## 2023-08-15 DIAGNOSIS — F31.9 BIPOLAR DISEASE DURING PREGNANCY IN THIRD TRIMESTER: Primary | ICD-10-CM

## 2023-08-15 DIAGNOSIS — F90.2 ATTENTION DEFICIT HYPERACTIVITY DISORDER (ADHD), COMBINED TYPE: ICD-10-CM

## 2023-08-15 PROCEDURE — 99214 PR OFFICE/OUTPT VISIT, EST, LEVL IV, 30-39 MIN: ICD-10-PCS | Mod: S$GLB,,, | Performed by: PSYCHOLOGIST

## 2023-08-15 PROCEDURE — 90836 PR PSYCHOTHERAPY W/PATIENT W/E&M, 45 MIN (ADD ON): ICD-10-PCS | Mod: S$GLB,,, | Performed by: PSYCHOLOGIST

## 2023-08-15 PROCEDURE — 90836 PSYTX W PT W E/M 45 MIN: CPT | Mod: S$GLB,,, | Performed by: PSYCHOLOGIST

## 2023-08-15 PROCEDURE — 99999 PR PBB SHADOW E&M-EST. PATIENT-LVL III: ICD-10-PCS | Mod: PBBFAC,,, | Performed by: PSYCHOLOGIST

## 2023-08-15 PROCEDURE — 99214 OFFICE O/P EST MOD 30 MIN: CPT | Mod: S$GLB,,, | Performed by: PSYCHOLOGIST

## 2023-08-15 PROCEDURE — 99999 PR PBB SHADOW E&M-EST. PATIENT-LVL III: CPT | Mod: PBBFAC,,, | Performed by: PSYCHOLOGIST

## 2023-08-15 RX ORDER — LAMOTRIGINE 100 MG/1
100 TABLET ORAL EVERY MORNING
Qty: 30 TABLET | Refills: 3 | Status: SHIPPED | OUTPATIENT
Start: 2023-08-15 | End: 2023-11-30 | Stop reason: SDUPTHER

## 2023-08-15 RX ORDER — QUETIAPINE FUMARATE 200 MG/1
200 TABLET, FILM COATED ORAL NIGHTLY
Qty: 30 TABLET | Refills: 3 | Status: SHIPPED | OUTPATIENT
Start: 2023-08-15 | End: 2023-11-30 | Stop reason: SDUPTHER

## 2023-08-15 NOTE — PATIENT INSTRUCTIONS

## 2023-08-15 NOTE — PROGRESS NOTES
"  Outpatient Psychiatry Follow-Up Visit     8/15/2023      Clinical Status of Patient:  Outpatient (Ambulatory)    Chief Complaint:  Iris Johnson is a 28 y.o. female who presents today for follow-up of mood and inattention/distractibility .      Preferred Name: Iris  Gender Identity: cis female  Preferred Pronouns: she/her    Impressions/Plan from last visit: Iris attended her visit. She was a little late--her  had to drive. She is not driving because she said that "my brain is not working." Her due date is Sept 13th. She is feeling well with the pregnancy---baby is reportedly moving all the time. She does believe that her mood is stable overall--she is not sleeping well because she thinks that her Seroquel makes her heart race. We discussed decreasing her Seroquel (more sedating at 200 and under). Her  was injured--when they were at the beach. He is on short-term disability with his work. She is not working right now--can't focus at all without a stimulant. They got a new puppy--taking a lot of time. She is trying to rest as much as she can. Her mother-in-law has been around a lot--but she has been having to repeat herself multiple times. Mat is 9--they will be sharing a room when she is able to get Mat. He is mostly with her parents. Her parents are supportive--though she feels very hesitant and has no plans to ever leave the baby alone with them because of what happened with her other son. She and her  have considered getting an  to try and get custody, but her parents reportedly have a lot of money and would put them in debt. Mat is living in [place], [place], with her parents--they have considered moving closer so Mat can be more involved with the baby. She does miss working with her dad and recognizes that her dad notices that she is different now than 10 years ago. She is planning to homeschool her son--she does plan to have him involved in sports and other " "programs--there are coops, too. She is proud of her planning and thinking ahead. She still has not gotten her name changed--wants limited time alone with her mother-in-law. Now that her  is not working, he can take her to the social security office to change her name. She still has some trouble with her appetite--some days can't eat. See plan below.    Plan--continue Lamictal 200 mg; decrease Seroquel 200 mg hs;  to drive her to change her name    Interval History and Content of Current Session: Iris attended her visit. She said that she eats a lot--wakes up hungry and drinks Ensure drinks; has coffee; eats 4-5 times a day. She is still not sleeping well--wakes up a lot during the night with hurting from different positions. Things with her dad and step-mom (Lei) have been pretty good--"it's always been better when I'm pregnant." Her son, Mat, still lives primarily with her dad and step-mom--he has gone overnight with Iris. Also, her step-mom recently called Iris to ask if she wanted Mat to stay with her while they went to [place]. Lei has also been helping with getting the nursery ready, etc. Iris is getting excited about the delivery--plans to have a tub birth. She reported that she had seizures in years--maybe one since she had Mat; she had them prior to delivery and during the delivery with him (thinks from detoxing from drugs).     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 She had stopped working after pregnancy because she had to get off of her medicine. She does not plan to work after the baby is born. She has had problems with her car. Her  has been on temporary disability since June--h would be out 8-12 weeks and is currently in PT. Hoping that he will be able to go back to work after the baby is born.     She has been taking Lamictal and Seroquel--reported nosebleeds with her prenatals. She said that " she had missed some days of Lamictal because of her eating habits--restarted at 100 mg (back on it for about a week).    She has not changed her name but has completed another application. She hopes to do this tomorrow.    Plan--continue Lamictal 100 mg; Seroquel 200 mg hs;  to drive her to change her name    PSYCHOTHERAPY      Site: [facility]  Time: 38 minutes  Participants: Met with patient    Therapeutic Intervention Type: behavior modifying psychotherapy, supportive psychotherapy  Why chosen therapy is appropriate versus another modality: patient responds to this modality, evidence based practice    Target symptoms: anxiety , mood swings, pregnancy  Primary focus: see above    Outcome monitoring methods: self-report, observation    Patient's response to intervention:  The patient's response to intervention is accepting.    Progress toward goals:  The patient's progress toward goals is fair .    GAD7 3/22/2023 2/14/2023 11/16/2022   1. Feeling nervous, anxious, or on edge? 1 3 1   2. Not being able to stop or control worrying? 1 3 1   3. Worrying too much about different things? 1 3 1   4. Trouble relaxing? 1 3 1   5. Being so restless that it is hard to sit still? 1 3 1   6. Becoming easily annoyed or irritable? 1 3 0   7. Feeling afraid as if something awful might happen? 1 1 1   NOLVIA-7 Score 7 19 6      0-4 = Minimal anxiety  5-9 = Mild anxiety  10-14 = Moderate anxiety  15-21 = Severe anxiety       Depression Patient Health Questionnaire 8/1/2022   Over the last two weeks how often have you been bothered by little interest or pleasure in doing things Several days   Over the last two weeks how often have you been bothered by feeling down, depressed or hopeless Several days   PHQ-2 Total Score 2     0-4 = No intervention  5 to 9 = Mild  10 to 14 = Moderate  15 to 19 = Moderately severe  =20 = Severe    Review of Systems   PSYCHIATRIC: Pertinant items are noted in the narrative.    Past  "Medical, Family and Social History: The patient's past medical, family and social history have been reviewed and updated as appropriate within the electronic medical record - see encounter notes.      Current Outpatient Medications:     lamoTRIgine (LAMICTAL) 100 MG tablet, Take 1 tablet (100 mg total) by mouth every morning., Disp: 30 tablet, Rfl: 3    PRENATAL VITAMIN 27 mg iron- 0.8 mg Tab, Take 1 tablet by mouth., Disp: , Rfl:     QUEtiapine (SEROQUEL) 200 MG Tab, Take 1 tablet (200 mg total) by mouth every evening., Disp: 30 tablet, Rfl: 3    Current Facility-Administered Medications:     onabotulinumtoxina injection 200 Units, 200 Units, Intramuscular, q12 weeks, Deb Aguirre, NP, 200 Units at 06/08/23 5717    Compliance: yes    Side effects: see above    Risk Parameters:  Patient reports no suicidal ideation  Patient reports no homicidal ideation  Patient reports no self-injurious behavior  Patient reports no violent behavior    Exam (detailed: at least 9 elements; comprehensive: all 15 elements)   Constitutional  Vitals:  Most recent vital signs were reviewed.   Last 3 sets of Vitals    Vitals - 1 value per visit 7/13/2023 8/3/2023 8/15/2023   SYSTOLIC 128 128 115   DIASTOLIC 72 68 82   Pulse - - 101   Temp - - -   Resp - - -   SPO2 - - -   Weight (lb) 126.54 131.39 135.14   Weight (kg) 57.4 59.6 61.3   Height - - -   BMI (Calculated) - - -   VISIT REPORT - - [id number]  [id number];   Pain Score  - - -          General:  age appropriate, casually dressed, neatly groomed, hair pulled up, wearing dark glasses     Musculoskeletal  Muscle Strength/Tone:  no tremor, no tic   Gait & Station:  non-ataxic     Psychiatric  Speech:  no latency; no press   Behavior: wnl   Mood & Affect:  "Okay"  congruent and appropriate   Thought Process:  normal and logical   Associations:  intact   Thought Content:  normal, no suicidality, no homicidality, delusions, or paranoia   Insight:  has awareness of " illness   Judgement: behavior is adequate to circumstances   Orientation:  grossly intact   Memory: intact for content of interview   Language: grossly intact   Attention Span & Concentration:  Grossly intact   Fund of Knowledge:  intact and appropriate to age and level of education     Assessment and Diagnosis   Status/Progress: Based on the examination today, the patient's problem(s) is/are adequately but not ideally controlled.  New problems have not been presented today.   Co-morbidities and psychosocial stressors  are complicating management of the primary condition.  There are no active rule-out diagnoses for this patient at this time.     General Impression:     Encounter Diagnoses   Name Primary?    Bipolar disease during pregnancy in third trimester Yes    Attention deficit hyperactivity disorder (ADHD), combined type        Intervention/Counseling/Treatment Plan   Medication Management: Discussed risks, benefits, and alternatives to treatment plan documented above with patient. I answered all patient questions related to this plan, and patient expressed understanding and agreement.   continue Lamictal 100 mg; Seroquel 200 mg hs  Counseling needed but restricted by time/resources   to drive her to change her name      Return to Clinic:  4 months    Medication List with Changes/Refills   Current Medications    PRENATAL VITAMIN 27 MG IRON- 0.8 MG TAB    Take 1 tablet by mouth.   Changed and/or Refilled Medications    Modified Medication Previous Medication    LAMOTRIGINE (LAMICTAL) 100 MG TABLET lamoTRIgine (LAMICTAL) 200 MG tablet       Take 1 tablet (100 mg total) by mouth every morning.    Take 1 tablet (200 mg total) by mouth every morning.    QUETIAPINE (SEROQUEL) 200 MG TAB QUEtiapine (SEROQUEL) 200 MG Tab       Take 1 tablet (200 mg total) by mouth every evening.    Take 1 tablet (200 mg total) by mouth every evening.   Discontinued Medications    PREDNISONE (DELTASONE) 20 MG TABLET    Take 3  tablets (60 mg total) by mouth once daily for 3 days, THEN 2 tablets (40 mg total) once daily for 3 days, THEN 1 tablet (20 mg total) once daily for 3 days, THEN 0.5 tablets (10 mg total) once daily for 2 days.    TRIAMCINOLONE ACETONIDE 0.1% (KENALOG) 0.1 % CREAM    Apply topically 2 (two) times daily as needed (itching).        I spent an additional 5 minutes performing E/M services with >50% spent on counseling, guidance, coordinating care (not Psychotherapy related) in addition to the 38 minutes performing Psychotherapy.    Time spent with pt including note preparation: 43 minutes     Tiffanie Galvez, PhD, MP  Advanced Practice Medical Psychologist  [facility]  [id number]  [place] [id number]  [phone]   [phone] fax

## 2023-08-16 ENCOUNTER — ROUTINE PRENATAL (OUTPATIENT)
Dept: OBSTETRICS AND GYNECOLOGY | Facility: CLINIC | Age: 28
End: 2023-08-16
Payer: COMMERCIAL

## 2023-08-16 ENCOUNTER — PROCEDURE VISIT (OUTPATIENT)
Dept: OBSTETRICS AND GYNECOLOGY | Facility: CLINIC | Age: 28
End: 2023-08-16
Payer: COMMERCIAL

## 2023-08-16 VITALS
BODY MASS INDEX: 26.39 KG/M2 | DIASTOLIC BLOOD PRESSURE: 86 MMHG | SYSTOLIC BLOOD PRESSURE: 124 MMHG | WEIGHT: 135.13 LBS

## 2023-08-16 DIAGNOSIS — Z36.89 ENCOUNTER FOR ULTRASOUND TO ASSESS FETAL GROWTH: ICD-10-CM

## 2023-08-16 DIAGNOSIS — Z34.83 ENCOUNTER FOR SUPERVISION OF OTHER NORMAL PREGNANCY IN THIRD TRIMESTER: Primary | ICD-10-CM

## 2023-08-16 PROCEDURE — 0502F PR SUBSEQUENT PRENATAL CARE: ICD-10-PCS | Mod: S$GLB,,, | Performed by: ADVANCED PRACTICE MIDWIFE

## 2023-08-16 PROCEDURE — 99999 PR PBB SHADOW E&M-EST. PATIENT-LVL III: ICD-10-PCS | Mod: PBBFAC,,, | Performed by: ADVANCED PRACTICE MIDWIFE

## 2023-08-16 PROCEDURE — 76816 OB US FOLLOW-UP PER FETUS: CPT | Mod: S$GLB,,, | Performed by: OBSTETRICS & GYNECOLOGY

## 2023-08-16 PROCEDURE — 99999 PR PBB SHADOW E&M-EST. PATIENT-LVL III: CPT | Mod: PBBFAC,,, | Performed by: ADVANCED PRACTICE MIDWIFE

## 2023-08-16 PROCEDURE — 87081 CULTURE SCREEN ONLY: CPT | Performed by: ADVANCED PRACTICE MIDWIFE

## 2023-08-16 PROCEDURE — 0502F SUBSEQUENT PRENATAL CARE: CPT | Mod: S$GLB,,, | Performed by: ADVANCED PRACTICE MIDWIFE

## 2023-08-16 PROCEDURE — 76816 US OB/GYN PROCEDURE (VIEWPOINT): ICD-10-PCS | Mod: S$GLB,,, | Performed by: OBSTETRICS & GYNECOLOGY

## 2023-08-16 NOTE — PROGRESS NOTES
28 y.o. female  at 36w0d  Reports + FM, denies VB, LOF or regular CTX  Doing well without concerns, scheduled for next botox injections 23  /86   Wt 61.3 kg (135 lb 2.3 oz)   LMP  (LMP Unknown)   BMI 26.39 kg/m²   TW lbs   GBS collected today   The skin of the suprapubic region was evaluated and appears clean, dry and intact.    VE per pt request   US today for growth/position today with cephalic presentation, posterior placenta, 3VC, MVP 7.1cm, EFW 28%, 5lb 15oz, reviewed with pt    Encounter for supervision of other normal pregnancy in third trimester  -     STREP B SCREEN, VAGINAL / RECTAL    Reviewed warning signs, normal FKCs, labor precautions and how/when to call.  RTC x 1 wk, call or present sooner prn.

## 2023-08-19 LAB — BACTERIA SPEC AEROBE CULT: NORMAL

## 2023-08-25 ENCOUNTER — ROUTINE PRENATAL (OUTPATIENT)
Dept: OBSTETRICS AND GYNECOLOGY | Facility: CLINIC | Age: 28
End: 2023-08-25
Payer: COMMERCIAL

## 2023-08-25 VITALS — SYSTOLIC BLOOD PRESSURE: 126 MMHG | DIASTOLIC BLOOD PRESSURE: 66 MMHG | WEIGHT: 138.25 LBS | BODY MASS INDEX: 27 KG/M2

## 2023-08-25 DIAGNOSIS — Z34.83 ENCOUNTER FOR SUPERVISION OF OTHER NORMAL PREGNANCY IN THIRD TRIMESTER: Primary | ICD-10-CM

## 2023-08-25 DIAGNOSIS — F31.9 BIPOLAR AFFECTIVE DISORDER, REMISSION STATUS UNSPECIFIED: ICD-10-CM

## 2023-08-25 PROCEDURE — 0502F SUBSEQUENT PRENATAL CARE: CPT | Mod: S$GLB,,, | Performed by: ADVANCED PRACTICE MIDWIFE

## 2023-08-25 PROCEDURE — 99999 PR PBB SHADOW E&M-EST. PATIENT-LVL III: CPT | Mod: PBBFAC,,, | Performed by: ADVANCED PRACTICE MIDWIFE

## 2023-08-25 PROCEDURE — 99999 PR PBB SHADOW E&M-EST. PATIENT-LVL III: ICD-10-PCS | Mod: PBBFAC,,, | Performed by: ADVANCED PRACTICE MIDWIFE

## 2023-08-25 PROCEDURE — 0502F PR SUBSEQUENT PRENATAL CARE: ICD-10-PCS | Mod: S$GLB,,, | Performed by: ADVANCED PRACTICE MIDWIFE

## 2023-08-25 NOTE — PROGRESS NOTES
28 y.o. female  at 37w2d   Reports + FM, denies VB, LOF or regular CTX  Doing well without concerns, prepared for baby  /66   Wt 62.7 kg (138 lb 3.7 oz)   LMP  (LMP Unknown)   BMI 27.00 kg/m²   TW lbs     Encounter for supervision of other normal pregnancy in third trimester    Bipolar affective disorder, remission status unspecified       Declines VE at this time  Reviewed warning signs, normal FKCs, labor precautions and how/when to call.  RTC x 1 wk, call or present sooner prn.

## 2023-08-30 ENCOUNTER — ROUTINE PRENATAL (OUTPATIENT)
Dept: OBSTETRICS AND GYNECOLOGY | Facility: CLINIC | Age: 28
End: 2023-08-30
Payer: COMMERCIAL

## 2023-08-30 VITALS
SYSTOLIC BLOOD PRESSURE: 110 MMHG | BODY MASS INDEX: 26.87 KG/M2 | DIASTOLIC BLOOD PRESSURE: 66 MMHG | WEIGHT: 137.56 LBS

## 2023-08-30 DIAGNOSIS — F31.9 BIPOLAR AFFECTIVE DISORDER, REMISSION STATUS UNSPECIFIED: ICD-10-CM

## 2023-08-30 DIAGNOSIS — Z34.83 ENCOUNTER FOR SUPERVISION OF OTHER NORMAL PREGNANCY IN THIRD TRIMESTER: Primary | ICD-10-CM

## 2023-08-30 PROCEDURE — 0502F SUBSEQUENT PRENATAL CARE: CPT | Mod: S$GLB,,, | Performed by: ADVANCED PRACTICE MIDWIFE

## 2023-08-30 PROCEDURE — 99999 PR PBB SHADOW E&M-EST. PATIENT-LVL III: CPT | Mod: PBBFAC,,, | Performed by: ADVANCED PRACTICE MIDWIFE

## 2023-08-30 PROCEDURE — 99999 PR PBB SHADOW E&M-EST. PATIENT-LVL III: ICD-10-PCS | Mod: PBBFAC,,, | Performed by: ADVANCED PRACTICE MIDWIFE

## 2023-08-30 PROCEDURE — 0502F PR SUBSEQUENT PRENATAL CARE: ICD-10-PCS | Mod: S$GLB,,, | Performed by: ADVANCED PRACTICE MIDWIFE

## 2023-08-30 NOTE — PROGRESS NOTES
28 y.o. female  at 38w0d   Reports + FM, denies VB, LOF or regular CTX  Doing well without concerns, very ready for baby,  comes back from [place] tomorrow from work  /66   Wt 62.4 kg (137 lb 9.1 oz)   LMP  (LMP Unknown)   BMI 26.87 kg/m²   TW lbs     Encounter for supervision of other normal pregnancy in third trimester    Bipolar affective disorder, remission status unspecified    VE per pt request  Reviewed warning signs, normal FKCs, labor precautions and how/when to call.  RTC x 1 wk, call or present sooner prn.

## 2023-08-31 ENCOUNTER — PROCEDURE VISIT (OUTPATIENT)
Dept: NEUROLOGY | Facility: CLINIC | Age: 28
End: 2023-08-31
Payer: COMMERCIAL

## 2023-08-31 VITALS
BODY MASS INDEX: 26.87 KG/M2 | DIASTOLIC BLOOD PRESSURE: 77 MMHG | SYSTOLIC BLOOD PRESSURE: 120 MMHG | RESPIRATION RATE: 20 BRPM | WEIGHT: 137.56 LBS | HEART RATE: 92 BPM

## 2023-08-31 DIAGNOSIS — G43.719 INTRACTABLE CHRONIC MIGRAINE WITHOUT AURA AND WITHOUT STATUS MIGRAINOSUS: Primary | ICD-10-CM

## 2023-08-31 PROCEDURE — 64615 CHEMODENERV MUSC MIGRAINE: CPT | Mod: S$GLB,,, | Performed by: NURSE PRACTITIONER

## 2023-08-31 PROCEDURE — 64615 PR CHEMODENERVATION OF MUSCLE FOR CHRONIC MIGRAINE: ICD-10-PCS | Mod: S$GLB,,, | Performed by: NURSE PRACTITIONER

## 2023-08-31 NOTE — PROCEDURES
Procedures     BOTOX PROCEDURE    PROCEDURE PERFORMED: Botulinum toxin injection (06537)    CLINICAL INDICATION: G43.719    Cycle #7    A time out was conducted just before the start of the procedure to verify the correct patient and procedure, procedure location, and all relevant critical information.   Signed consent obtained prior to procedure - patient is currently 38 weeks pregnant. Risks/benefits discussed.    Conventional methods of treatment but the patient has been unresponsive and refractory.The patient meets criteria for chronic headaches according to the ICHD-III, the patient has more than 15 headaches a month at least 8 of them meet migraine criteria, which last for more than 4 hours a day.     Last Botox injections were on 6/20/22  and  there has been over 50%  improvement in the patient's symptoms. Frequency of treatment is every 12 weeks unless no response to the treatments, at which time we will discontinue the injections.     DESCRIPTION OF PROCEDURE: After obtaining informed consent and under  aseptic technique, a total of 155 units of botulinum toxin type A were   injected in the following muscles: Procerus 5 units,  5 units  bilaterally, frontalis 20 units, temporalis 20 units bilaterally,  occipitalis 15 units, upper cervical paraspinals 10 units bilaterally and trapezius 15 units bilaterally. The patient was given a total of 155 units in 31 sites.    The patient tolerated the procedure well. There were no complications. The patient was given a prescription for repeat treatment in 12 weeks.     Unavoidable waste 45 units    RTC in 12 weeks.     SERA Goodman

## 2023-09-07 ENCOUNTER — ROUTINE PRENATAL (OUTPATIENT)
Dept: OBSTETRICS AND GYNECOLOGY | Facility: CLINIC | Age: 28
End: 2023-09-07
Payer: COMMERCIAL

## 2023-09-07 VITALS
WEIGHT: 141.13 LBS | SYSTOLIC BLOOD PRESSURE: 112 MMHG | BODY MASS INDEX: 27.56 KG/M2 | DIASTOLIC BLOOD PRESSURE: 72 MMHG

## 2023-09-07 DIAGNOSIS — O48.0 POST-TERM PREGNANCY, 40-42 WEEKS OF GESTATION: ICD-10-CM

## 2023-09-07 DIAGNOSIS — F31.9 BIPOLAR AFFECTIVE DISORDER, REMISSION STATUS UNSPECIFIED: ICD-10-CM

## 2023-09-07 DIAGNOSIS — Z34.83 ENCOUNTER FOR SUPERVISION OF OTHER NORMAL PREGNANCY IN THIRD TRIMESTER: Primary | ICD-10-CM

## 2023-09-07 PROCEDURE — 99999 PR PBB SHADOW E&M-EST. PATIENT-LVL III: ICD-10-PCS | Mod: PBBFAC,,, | Performed by: ADVANCED PRACTICE MIDWIFE

## 2023-09-07 PROCEDURE — 99999 PR PBB SHADOW E&M-EST. PATIENT-LVL III: CPT | Mod: PBBFAC,,, | Performed by: ADVANCED PRACTICE MIDWIFE

## 2023-09-07 PROCEDURE — 0502F SUBSEQUENT PRENATAL CARE: CPT | Mod: S$GLB,,, | Performed by: ADVANCED PRACTICE MIDWIFE

## 2023-09-07 PROCEDURE — 0502F PR SUBSEQUENT PRENATAL CARE: ICD-10-PCS | Mod: S$GLB,,, | Performed by: ADVANCED PRACTICE MIDWIFE

## 2023-09-07 NOTE — PROGRESS NOTES
28 y.o. female  at 39w1d   Reports + FM, denies VB, LOF or regular CTX  Doing well without concerns, ready for baby, does not want membrane sweep or postdates IOL  /72   Wt 64 kg (141 lb 1.5 oz)   LMP  (LMP Unknown)   BMI 27.56 kg/m²   TW lbs     Encounter for supervision of other normal pregnancy in third trimester    Bipolar affective disorder, remission status unspecified    VE per pt request  Reviewed warning signs, normal FKCs, labor precautions and how/when to call.  RTC x 1 wk, call or present sooner prn.

## 2023-09-13 ENCOUNTER — TELEPHONE (OUTPATIENT)
Dept: RADIOLOGY | Facility: HOSPITAL | Age: 28
End: 2023-09-13
Payer: COMMERCIAL

## 2023-09-13 ENCOUNTER — ROUTINE PRENATAL (OUTPATIENT)
Dept: OBSTETRICS AND GYNECOLOGY | Facility: CLINIC | Age: 28
End: 2023-09-13
Payer: COMMERCIAL

## 2023-09-13 VITALS — BODY MASS INDEX: 27.9 KG/M2 | WEIGHT: 142.88 LBS | SYSTOLIC BLOOD PRESSURE: 126 MMHG | DIASTOLIC BLOOD PRESSURE: 78 MMHG

## 2023-09-13 DIAGNOSIS — Z34.83 ENCOUNTER FOR SUPERVISION OF OTHER NORMAL PREGNANCY IN THIRD TRIMESTER: ICD-10-CM

## 2023-09-13 DIAGNOSIS — O48.0 POST-TERM PREGNANCY, 40-42 WEEKS OF GESTATION: Primary | ICD-10-CM

## 2023-09-13 PROCEDURE — 99999 PR PBB SHADOW E&M-EST. PATIENT-LVL III: ICD-10-PCS | Mod: PBBFAC,,, | Performed by: ADVANCED PRACTICE MIDWIFE

## 2023-09-13 PROCEDURE — 0502F SUBSEQUENT PRENATAL CARE: CPT | Mod: S$GLB,,, | Performed by: ADVANCED PRACTICE MIDWIFE

## 2023-09-13 PROCEDURE — 0502F PR SUBSEQUENT PRENATAL CARE: ICD-10-PCS | Mod: S$GLB,,, | Performed by: ADVANCED PRACTICE MIDWIFE

## 2023-09-13 PROCEDURE — 99999 PR PBB SHADOW E&M-EST. PATIENT-LVL III: CPT | Mod: PBBFAC,,, | Performed by: ADVANCED PRACTICE MIDWIFE

## 2023-09-13 NOTE — TELEPHONE ENCOUNTER
Attempted to contact patient in regards to scheduled Ultrasound appointment today in [place]. Ultrasound tech will not be in clinic and ultrasound will need to be rescheduled. No answer, unable to leave callback message. Will send MyChart Communication.

## 2023-09-13 NOTE — PROGRESS NOTES
28 y.o. female  at 40w0d   Reports + FM, denies VB, LOF or regular CTX  Doing well without concerns, very ready for baby, son has homecoming this Friday  /78   Wt 64.8 kg (142 lb 13.7 oz)   LMP  (LMP Unknown)   BMI 27.90 kg/m²   TW lbs     Post-term pregnancy, 40-42 weeks of gestation  -     US OB/GYN Procedure ([facility])-Future; Future    Encounter for supervision of other normal pregnancy in third trimester    VE per pt request  Reviewed GBS negative   Discussed postdates management and IOL - she prefers to await spontaneous labor if possible   BPPs discussed and ordered  Reviewed warning signs, normal FKCs, labor precautions and how/when to call.  RTC x 1 wk with u/s, call or present sooner prn.

## 2023-09-19 ENCOUNTER — ANESTHESIA (OUTPATIENT)
Dept: OBSTETRICS AND GYNECOLOGY | Facility: HOSPITAL | Age: 28
End: 2023-09-19
Payer: COMMERCIAL

## 2023-09-19 ENCOUNTER — HOSPITAL ENCOUNTER (INPATIENT)
Facility: HOSPITAL | Age: 28
LOS: 2 days | Discharge: HOME | End: 2023-09-21
Attending: OBSTETRICS & GYNECOLOGY | Admitting: OBSTETRICS & GYNECOLOGY
Payer: COMMERCIAL

## 2023-09-19 ENCOUNTER — ANESTHESIA EVENT (OUTPATIENT)
Dept: OBSTETRICS AND GYNECOLOGY | Facility: HOSPITAL | Age: 28
End: 2023-09-19
Payer: COMMERCIAL

## 2023-09-19 DIAGNOSIS — Z37.9 NORMAL LABOR: Primary | ICD-10-CM

## 2023-09-19 DIAGNOSIS — Z37.9 VACUUM-ASSISTED VAGINAL DELIVERY: ICD-10-CM

## 2023-09-19 LAB
ABO + RH BLD: NORMAL
AMPHET+METHAMPHET UR QL: NEGATIVE
BARBITURATES UR QL SCN>200 NG/ML: NEGATIVE
BASOPHILS # BLD AUTO: 0.07 K/UL (ref 0–0.2)
BASOPHILS NFR BLD: 0.5 % (ref 0–1.9)
BENZODIAZ UR QL SCN>200 NG/ML: NEGATIVE
BLD GP AB SCN CELLS X3 SERPL QL: NORMAL
BZE UR QL SCN: NEGATIVE
CANNABINOIDS UR QL SCN: NEGATIVE
CREAT UR-MCNC: 58.5 MG/DL (ref 15–325)
DIFFERENTIAL METHOD: ABNORMAL
EOSINOPHIL # BLD AUTO: 0.1 K/UL (ref 0–0.5)
EOSINOPHIL NFR BLD: 0.4 % (ref 0–8)
ERYTHROCYTE [DISTWIDTH] IN BLOOD BY AUTOMATED COUNT: 13.3 % (ref 11.5–14.5)
HCO3 UR-SCNC: 22.3 MMOL/L (ref 24–28)
HCT VFR BLD AUTO: 35.7 % (ref 37–48.5)
HGB BLD-MCNC: 11.8 G/DL (ref 12–16)
IMM GRANULOCYTES # BLD AUTO: 0.26 K/UL (ref 0–0.04)
IMM GRANULOCYTES NFR BLD AUTO: 1.8 % (ref 0–0.5)
LYMPHOCYTES # BLD AUTO: 3.8 K/UL (ref 1–4.8)
LYMPHOCYTES NFR BLD: 26.6 % (ref 18–48)
MCH RBC QN AUTO: 29.9 PG (ref 27–31)
MCHC RBC AUTO-ENTMCNC: 33.1 G/DL (ref 32–36)
MCV RBC AUTO: 91 FL (ref 82–98)
METHADONE UR QL SCN>300 NG/ML: NEGATIVE
MONOCYTES # BLD AUTO: 1.1 K/UL (ref 0.3–1)
MONOCYTES NFR BLD: 7.8 % (ref 4–15)
NEUTROPHILS # BLD AUTO: 9 K/UL (ref 1.8–7.7)
NEUTROPHILS NFR BLD: 62.9 % (ref 38–73)
NRBC BLD-RTO: 0 /100 WBC
OPIATES UR QL SCN: NEGATIVE
PCO2 BLDA: 58 MMHG (ref 35–45)
PCP UR QL SCN>25 NG/ML: NEGATIVE
PH SMN: 7.19 [PH] (ref 7.35–7.45)
PLATELET # BLD AUTO: 182 K/UL (ref 150–450)
PMV BLD AUTO: 12.7 FL (ref 9.2–12.9)
PO2 BLDA: 26 MMHG (ref 80–100)
POC BE: -6 MMOL/L
POC SATURATED O2: 35 % (ref 95–100)
RBC # BLD AUTO: 3.94 M/UL (ref 4–5.4)
SAMPLE: ABNORMAL
SITE: ABNORMAL
TOXICOLOGY INFORMATION: NORMAL
WBC # BLD AUTO: 14.31 K/UL (ref 3.9–12.7)

## 2023-09-19 PROCEDURE — 27800516 HC TRAY, EPIDURAL COMBO: Performed by: ANESTHESIOLOGY

## 2023-09-19 PROCEDURE — 36416 COLLJ CAPILLARY BLOOD SPEC: CPT

## 2023-09-19 PROCEDURE — 86901 BLOOD TYPING SEROLOGIC RH(D): CPT

## 2023-09-19 PROCEDURE — 62326 NJX INTERLAMINAR LMBR/SAC: CPT | Performed by: NURSE ANESTHETIST, CERTIFIED REGISTERED

## 2023-09-19 PROCEDURE — 80307 DRUG TEST PRSMV CHEM ANLYZR: CPT

## 2023-09-19 PROCEDURE — 25000003 PHARM REV CODE 250

## 2023-09-19 PROCEDURE — 63600175 PHARM REV CODE 636 W HCPCS: Performed by: NURSE ANESTHETIST, CERTIFIED REGISTERED

## 2023-09-19 PROCEDURE — 59400 PR FULL ROUT OBSTE CARE,VAGINAL DELIV: ICD-10-PCS | Mod: ,,,

## 2023-09-19 PROCEDURE — 11000001 HC ACUTE MED/SURG PRIVATE ROOM

## 2023-09-19 PROCEDURE — 63600175 PHARM REV CODE 636 W HCPCS

## 2023-09-19 PROCEDURE — 27200710 HC EPIDURAL INFUSION PUMP SET: Performed by: ANESTHESIOLOGY

## 2023-09-19 PROCEDURE — 82803 BLOOD GASES ANY COMBINATION: CPT

## 2023-09-19 PROCEDURE — 85025 COMPLETE CBC W/AUTO DIFF WBC: CPT

## 2023-09-19 PROCEDURE — 72100002 HC LABOR CARE, 1ST 8 HOURS

## 2023-09-19 PROCEDURE — 51701 INSERT BLADDER CATHETER: CPT

## 2023-09-19 PROCEDURE — 59400 OBSTETRICAL CARE: CPT | Mod: ,,,

## 2023-09-19 PROCEDURE — 99900035 HC TECH TIME PER 15 MIN (STAT)

## 2023-09-19 PROCEDURE — 72200006 HC VAGINAL DELIVERY LEVEL III

## 2023-09-19 RX ORDER — MISOPROSTOL 200 UG/1
800 TABLET ORAL ONCE AS NEEDED
Status: DISCONTINUED | OUTPATIENT
Start: 2023-09-19 | End: 2023-09-19

## 2023-09-19 RX ORDER — OXYTOCIN/RINGER'S LACTATE 30/500 ML
95 PLASTIC BAG, INJECTION (ML) INTRAVENOUS ONCE AS NEEDED
Status: DISCONTINUED | OUTPATIENT
Start: 2023-09-19 | End: 2023-09-19

## 2023-09-19 RX ORDER — ROPIVACAINE HYDROCHLORIDE 2 MG/ML
INJECTION, SOLUTION EPIDURAL; INFILTRATION CONTINUOUS PRN
Status: DISCONTINUED | OUTPATIENT
Start: 2023-09-19 | End: 2023-09-19

## 2023-09-19 RX ORDER — METHYLERGONOVINE MALEATE 0.2 MG/ML
200 INJECTION INTRAVENOUS
Status: DISCONTINUED | OUTPATIENT
Start: 2023-09-19 | End: 2023-09-21 | Stop reason: HOSPADM

## 2023-09-19 RX ORDER — ONDANSETRON 8 MG/1
8 TABLET, ORALLY DISINTEGRATING ORAL EVERY 8 HOURS PRN
Status: DISCONTINUED | OUTPATIENT
Start: 2023-09-19 | End: 2023-09-19

## 2023-09-19 RX ORDER — SODIUM CHLORIDE 0.9 % (FLUSH) 0.9 %
10 SYRINGE (ML) INJECTION
Status: DISCONTINUED | OUTPATIENT
Start: 2023-09-19 | End: 2023-09-21 | Stop reason: HOSPADM

## 2023-09-19 RX ORDER — LIDOCAINE HYDROCHLORIDE 10 MG/ML
10 INJECTION, SOLUTION EPIDURAL; INFILTRATION; INTRACAUDAL; PERINEURAL ONCE AS NEEDED
Status: DISCONTINUED | OUTPATIENT
Start: 2023-09-19 | End: 2023-09-19

## 2023-09-19 RX ORDER — DOCUSATE SODIUM 100 MG/1
200 CAPSULE, LIQUID FILLED ORAL 2 TIMES DAILY PRN
Status: DISCONTINUED | OUTPATIENT
Start: 2023-09-19 | End: 2023-09-21 | Stop reason: HOSPADM

## 2023-09-19 RX ORDER — OXYTOCIN 10 [USP'U]/ML
10 INJECTION, SOLUTION INTRAMUSCULAR; INTRAVENOUS ONCE AS NEEDED
Status: DISCONTINUED | OUTPATIENT
Start: 2023-09-19 | End: 2023-09-21 | Stop reason: HOSPADM

## 2023-09-19 RX ORDER — DIPHENOXYLATE HYDROCHLORIDE AND ATROPINE SULFATE 2.5; .025 MG/1; MG/1
2 TABLET ORAL EVERY 6 HOURS PRN
Status: DISCONTINUED | OUTPATIENT
Start: 2023-09-19 | End: 2023-09-21 | Stop reason: HOSPADM

## 2023-09-19 RX ORDER — TRANEXAMIC ACID 10 MG/ML
1000 INJECTION, SOLUTION INTRAVENOUS ONCE AS NEEDED
Status: DISCONTINUED | OUTPATIENT
Start: 2023-09-19 | End: 2023-09-19

## 2023-09-19 RX ORDER — OXYTOCIN/RINGER'S LACTATE 30/500 ML
334 PLASTIC BAG, INJECTION (ML) INTRAVENOUS ONCE AS NEEDED
Status: DISCONTINUED | OUTPATIENT
Start: 2023-09-19 | End: 2023-09-19

## 2023-09-19 RX ORDER — ACETAMINOPHEN 325 MG/1
650 TABLET ORAL EVERY 6 HOURS PRN
Status: DISCONTINUED | OUTPATIENT
Start: 2023-09-19 | End: 2023-09-21 | Stop reason: HOSPADM

## 2023-09-19 RX ORDER — MISOPROSTOL 200 UG/1
800 TABLET ORAL ONCE AS NEEDED
Status: DISCONTINUED | OUTPATIENT
Start: 2023-09-19 | End: 2023-09-21 | Stop reason: HOSPADM

## 2023-09-19 RX ORDER — SIMETHICONE 80 MG
1 TABLET,CHEWABLE ORAL EVERY 6 HOURS PRN
Status: DISCONTINUED | OUTPATIENT
Start: 2023-09-19 | End: 2023-09-21 | Stop reason: HOSPADM

## 2023-09-19 RX ORDER — ONDANSETRON 8 MG/1
8 TABLET, ORALLY DISINTEGRATING ORAL EVERY 8 HOURS PRN
Status: DISCONTINUED | OUTPATIENT
Start: 2023-09-19 | End: 2023-09-21 | Stop reason: HOSPADM

## 2023-09-19 RX ORDER — ACETAMINOPHEN 325 MG/1
650 TABLET ORAL EVERY 6 HOURS PRN
Status: DISCONTINUED | OUTPATIENT
Start: 2023-09-19 | End: 2023-09-21

## 2023-09-19 RX ORDER — OXYTOCIN/RINGER'S LACTATE 30/500 ML
334 PLASTIC BAG, INJECTION (ML) INTRAVENOUS ONCE AS NEEDED
Status: COMPLETED | OUTPATIENT
Start: 2023-09-19 | End: 2023-09-19

## 2023-09-19 RX ORDER — PRENATAL WITH FERROUS FUM AND FOLIC ACID 3080; 920; 120; 400; 22; 1.84; 3; 20; 10; 1; 12; 200; 27; 25; 2 [IU]/1; [IU]/1; MG/1; [IU]/1; MG/1; MG/1; MG/1; MG/1; MG/1; MG/1; UG/1; MG/1; MG/1; MG/1; MG/1
1 TABLET ORAL DAILY
Status: DISCONTINUED | OUTPATIENT
Start: 2023-09-19 | End: 2023-09-21 | Stop reason: HOSPADM

## 2023-09-19 RX ORDER — CARBOPROST TROMETHAMINE 250 UG/ML
250 INJECTION, SOLUTION INTRAMUSCULAR
Status: DISCONTINUED | OUTPATIENT
Start: 2023-09-19 | End: 2023-09-21 | Stop reason: HOSPADM

## 2023-09-19 RX ORDER — DIPHENHYDRAMINE HYDROCHLORIDE 50 MG/ML
25 INJECTION INTRAMUSCULAR; INTRAVENOUS EVERY 4 HOURS PRN
Status: DISCONTINUED | OUTPATIENT
Start: 2023-09-19 | End: 2023-09-21 | Stop reason: HOSPADM

## 2023-09-19 RX ORDER — TRANEXAMIC ACID 10 MG/ML
1000 INJECTION, SOLUTION INTRAVENOUS ONCE AS NEEDED
Status: DISCONTINUED | OUTPATIENT
Start: 2023-09-19 | End: 2023-09-21 | Stop reason: HOSPADM

## 2023-09-19 RX ORDER — OXYTOCIN/RINGER'S LACTATE 30/500 ML
95 PLASTIC BAG, INJECTION (ML) INTRAVENOUS ONCE AS NEEDED
Status: DISCONTINUED | OUTPATIENT
Start: 2023-09-19 | End: 2023-09-21 | Stop reason: HOSPADM

## 2023-09-19 RX ORDER — OXYTOCIN/RINGER'S LACTATE 30/500 ML
95 PLASTIC BAG, INJECTION (ML) INTRAVENOUS ONCE
Status: DISCONTINUED | OUTPATIENT
Start: 2023-09-19 | End: 2023-09-19

## 2023-09-19 RX ORDER — PROCHLORPERAZINE EDISYLATE 5 MG/ML
5 INJECTION INTRAMUSCULAR; INTRAVENOUS EVERY 6 HOURS PRN
Status: DISCONTINUED | OUTPATIENT
Start: 2023-09-19 | End: 2023-09-19

## 2023-09-19 RX ORDER — HYDROCODONE BITARTRATE AND ACETAMINOPHEN 5; 325 MG/1; MG/1
1 TABLET ORAL EVERY 4 HOURS PRN
Status: DISCONTINUED | OUTPATIENT
Start: 2023-09-19 | End: 2023-09-21 | Stop reason: HOSPADM

## 2023-09-19 RX ORDER — METHYLERGONOVINE MALEATE 0.2 MG/ML
200 INJECTION INTRAVENOUS
Status: DISCONTINUED | OUTPATIENT
Start: 2023-09-19 | End: 2023-09-19

## 2023-09-19 RX ORDER — OXYTOCIN 10 [USP'U]/ML
10 INJECTION, SOLUTION INTRAMUSCULAR; INTRAVENOUS ONCE AS NEEDED
Status: DISCONTINUED | OUTPATIENT
Start: 2023-09-19 | End: 2023-09-19

## 2023-09-19 RX ORDER — SODIUM CHLORIDE, SODIUM LACTATE, POTASSIUM CHLORIDE, CALCIUM CHLORIDE 600; 310; 30; 20 MG/100ML; MG/100ML; MG/100ML; MG/100ML
INJECTION, SOLUTION INTRAVENOUS CONTINUOUS
Status: DISCONTINUED | OUTPATIENT
Start: 2023-09-19 | End: 2023-09-19

## 2023-09-19 RX ORDER — CALCIUM CARBONATE 200(500)MG
500 TABLET,CHEWABLE ORAL 3 TIMES DAILY PRN
Status: DISCONTINUED | OUTPATIENT
Start: 2023-09-19 | End: 2023-09-19

## 2023-09-19 RX ORDER — DIPHENHYDRAMINE HCL 25 MG
25 CAPSULE ORAL EVERY 4 HOURS PRN
Status: DISCONTINUED | OUTPATIENT
Start: 2023-09-19 | End: 2023-09-21 | Stop reason: HOSPADM

## 2023-09-19 RX ORDER — HYDROCODONE BITARTRATE AND ACETAMINOPHEN 10; 325 MG/1; MG/1
1 TABLET ORAL ONCE
Status: COMPLETED | OUTPATIENT
Start: 2023-09-19 | End: 2023-09-19

## 2023-09-19 RX ORDER — DIPHENOXYLATE HYDROCHLORIDE AND ATROPINE SULFATE 2.5; .025 MG/1; MG/1
2 TABLET ORAL EVERY 6 HOURS PRN
Status: DISCONTINUED | OUTPATIENT
Start: 2023-09-19 | End: 2023-09-19

## 2023-09-19 RX ORDER — CARBOPROST TROMETHAMINE 250 UG/ML
250 INJECTION, SOLUTION INTRAMUSCULAR
Status: DISCONTINUED | OUTPATIENT
Start: 2023-09-19 | End: 2023-09-19

## 2023-09-19 RX ORDER — ROPIVACAINE HYDROCHLORIDE 2 MG/ML
INJECTION, SOLUTION EPIDURAL; INFILTRATION
Status: COMPLETED | OUTPATIENT
Start: 2023-09-19 | End: 2023-09-19

## 2023-09-19 RX ORDER — IBUPROFEN 600 MG/1
600 TABLET ORAL EVERY 6 HOURS
Status: DISCONTINUED | OUTPATIENT
Start: 2023-09-19 | End: 2023-09-21 | Stop reason: HOSPADM

## 2023-09-19 RX ORDER — HYDROCORTISONE 25 MG/G
CREAM TOPICAL 3 TIMES DAILY PRN
Status: DISCONTINUED | OUTPATIENT
Start: 2023-09-19 | End: 2023-09-21 | Stop reason: HOSPADM

## 2023-09-19 RX ORDER — OXYTOCIN/RINGER'S LACTATE 30/500 ML
95 PLASTIC BAG, INJECTION (ML) INTRAVENOUS ONCE
Status: DISCONTINUED | OUTPATIENT
Start: 2023-09-19 | End: 2023-09-21 | Stop reason: HOSPADM

## 2023-09-19 RX ORDER — OXYTOCIN/RINGER'S LACTATE 30/500 ML
334 PLASTIC BAG, INJECTION (ML) INTRAVENOUS ONCE
Status: DISCONTINUED | OUTPATIENT
Start: 2023-09-19 | End: 2023-09-19

## 2023-09-19 RX ORDER — PROCHLORPERAZINE EDISYLATE 5 MG/ML
5 INJECTION INTRAMUSCULAR; INTRAVENOUS EVERY 6 HOURS PRN
Status: DISCONTINUED | OUTPATIENT
Start: 2023-09-19 | End: 2023-09-21 | Stop reason: HOSPADM

## 2023-09-19 RX ORDER — SIMETHICONE 80 MG
1 TABLET,CHEWABLE ORAL 4 TIMES DAILY PRN
Status: DISCONTINUED | OUTPATIENT
Start: 2023-09-19 | End: 2023-09-19

## 2023-09-19 RX ADMIN — SODIUM CHLORIDE, POTASSIUM CHLORIDE, SODIUM LACTATE AND CALCIUM CHLORIDE 1000 ML: 600; 310; 30; 20 INJECTION, SOLUTION INTRAVENOUS at 10:09

## 2023-09-19 RX ADMIN — IBUPROFEN 600 MG: 600 TABLET, FILM COATED ORAL at 08:09

## 2023-09-19 RX ADMIN — Medication 334 MILLI-UNITS/MIN: at 11:09

## 2023-09-19 RX ADMIN — ROPIVACAINE HYDROCHLORIDE 4 ML: 2 INJECTION, SOLUTION EPIDURAL; INFILTRATION at 10:09

## 2023-09-19 RX ADMIN — ROPIVACAINE HYDROCHLORIDE 12 ML/HR: 2 INJECTION, SOLUTION EPIDURAL; INFILTRATION at 10:09

## 2023-09-19 RX ADMIN — HYDROCODONE BITARTRATE AND ACETAMINOPHEN 1 TABLET: 5; 325 TABLET ORAL at 09:09

## 2023-09-19 RX ADMIN — HYDROCODONE BITARTRATE AND ACETAMINOPHEN 1 TABLET: 10; 325 TABLET ORAL at 04:09

## 2023-09-19 RX ADMIN — IBUPROFEN 600 MG: 600 TABLET, FILM COATED ORAL at 01:09

## 2023-09-19 NOTE — ANESTHESIA POSTPROCEDURE EVALUATION
Anesthesia Post Evaluation    Patient: Iris Johnson    Procedure(s) Performed: * No procedures listed *    Final Anesthesia Type: epidural      Patient location during evaluation: labor & delivery  Patient participation: Yes- Able to Participate  Level of consciousness: awake and alert and oriented  Post-procedure vital signs: reviewed and stable  Pain management: adequate  Airway patency: patent  FRANCIS mitigation strategies: Use of major conduction anesthesia (spinal/epidural) or peripheral nerve block  PONV status at discharge: No PONV  Anesthetic complications: no      Cardiovascular status: hemodynamically stable  Respiratory status: spontaneous ventilation and room air  Hydration status: euvolemic  Follow-up not needed.          Vitals Value Taken Time   /71 09/19/23 1250   Temp 36.5 °C (97.7 °F) 09/19/23 1120   Pulse 79 09/19/23 1252   Resp 16 09/19/23 1253   SpO2 100 % 09/19/23 1252   Vitals shown include unvalidated device data.      No case tracking events are documented in the log.      Pain/Olayinka Score: Olayinka Score: 10 (9/19/2023 12:20 PM)

## 2023-09-19 NOTE — H&P
[facility] - Labor & Delivery  Obstetrics  History & Physical    Patient Name: Iris Johnson  MRN: [id number]  Admission Date: 2023  Primary Care Provider: Gurinder Perez MD    Subjective:     Principal Problem:Normal labor    History of Present Illness:  28 y.o.  40w6d with c/o contractions        Obstetric HPI:  Patient reports Frequency: Every 2-3 minutes contractions, active fetal movement, No vaginal bleeding , Yes loss of fluid     This pregnancy has been complicated by anxiety, ADHD, bipolar    OB History    Para Term  AB Living   2 1 1 0 0 1   SAB IAB Ectopic Multiple Live Births   0 0 0 0 1      # Outcome Date GA Lbr Karlo/2nd Weight Sex Delivery Anes PTL Lv   2 Current            1 Term      Vag-Spont        Past Medical History:   Diagnosis Date    ADHD (attention deficit hyperactivity disorder)     Anemia     ASCUS of cervix with negative high risk HPV 2023    Bipolar disorder     Chicken pox     childhood    Headache     Heart palpitations     Hypotension     Insomnia     Migraine     Postpartum depression     Substance abuse     prior use, no current use    Syncope     admitted x 1    Syncope      Past Surgical History:   Procedure Laterality Date    dental extraction ()      RHINOPLASTY  2010    TONSILLECTOMY         Facility-Administered Medications Prior to Admission   Medication    onabotulinumtoxina injection 200 Units     PTA Medications   Medication Sig    lamoTRIgine (LAMICTAL) 100 MG tablet Take 1 tablet (100 mg total) by mouth every morning.    prenatal 114-iron a-g-folate 1 (PRENATE ELITE, IRON ASP GLYC,) 20 mg iron- 1 mg Tab Take 1 tablet by mouth every other day.    QUEtiapine (SEROQUEL) 200 MG Tab Take 1 tablet (200 mg total) by mouth every evening.       Review of patient's allergies indicates:   Allergen Reactions    Bactrim [sulfamethoxazole-trimethoprim]     Sulfa (sulfonamide antibiotics)         Family History        Problem Relation (Age of Onset)    Arthritis Paternal Grandmother    Asthma Paternal Grandmother    Bipolar disorder Mother    Breast cancer Paternal Grandmother (55), Mother    Cancer Paternal Grandfather, Paternal Grandmother, Mother    Cervical cancer Mother    Depression Paternal Grandmother    Heart block Paternal Grandfather    Heart disease Paternal Grandfather    Hyperlipidemia Paternal Grandfather, Paternal Grandmother, Father    Hypertension Paternal Grandfather, Paternal Grandmother    Mental illness Mother    Skin cancer Mother    Stroke Paternal Grandmother          Tobacco Use    Smoking status: Former     Types: Vaping with nicotine    Smokeless tobacco: Never   Substance and Sexual Activity    Alcohol use: Not Currently     Comment: 1-2 drinks lper week    Drug use: Not Currently     Types: Amphetamines, Marijuana     Comment: narcotics, ecstacy (all prior use)    Sexual activity: Not Currently     Partners: Male     Birth control/protection: None     Comment: birth control pill     Review of Systems   Gastrointestinal:  Positive for abdominal pain.   Genitourinary:  Positive for vaginal discharge.        Reports SROM 40 min before arrival to hospital   All other systems reviewed and are negative.     Objective:     Vital Signs (Most Recent):    Vital Signs (24h Range):           There is no height or weight on file to calculate BMI.    FHT: 130Cat 1 (reassuring)  TOCO:  Q 2-3 minutes     Physical Exam:   Constitutional: She is oriented to person, place, and time. She appears well-developed and well-nourished.       Cardiovascular:  Normal rate.             Pulmonary/Chest: Effort normal. No respiratory distress.        Abdominal: Soft.     Genitourinary:    Vagina and uterus normal.             Musculoskeletal: Moves all extremeties.       Neurological: She is alert and oriented to person, place, and time.    Skin: Skin is warm and dry.         Cervix: per RN  Dilation:  5-6       Significant  Labs:  Lab Results   Component Value Date    GROUPTRH A POS 2023    HEPBSAG Non-reactive 2023    STREPBCULT No Group B Streptococcus isolated 2023       I have personallly reviewed all pertinent lab results from the last 24 hours.    Assessment/Plan:     28 y.o. female  at 40w6d for:    * Normal labor  VE 5-6cm  Admit to LD for labor  Epidural per patient request   UDS on admit    Bipolar disorder  .        Bhumika Stephen CNM  Obstetrics  [facility] - Labor & Delivery

## 2023-09-19 NOTE — ANESTHESIA PROCEDURE NOTES
Epidural    Patient location during procedure: OB   Reason for block: primary anesthetic   Reason for block: labor analgesia requested by patient and obstetrician  Diagnosis: IUP, Labor Pain   Start time: 9/19/2023 10:29 AM  Timeout: 9/19/2023 10:29 AM  End time: 9/19/2023 10:39 AM    Staffing  Performing Provider: Joao Friedman CRNA  Authorizing Provider: Hussein Jara MD    Staffing  Performed by: Joao Friedman CRNA  Authorized by: Hussein Jara MD        Preanesthetic Checklist  Completed: patient identified, IV checked, site marked, risks and benefits discussed, monitors and equipment checked, pre-op evaluation, timeout performed, anesthesia consent given, hand hygiene performed and patient being monitored  Preparation  Patient position: sitting  Prep: ChloraPrep  Patient monitoring: Pulse Ox and Blood Pressure  Reason for block: primary anesthetic   Epidural  Skin Anesthetic: lidocaine 1%  Skin Wheal: 3 mL  Administration type: continuous  Approach: midline  Interspace: L4-5    Injection technique: CHELY air  Needle and Epidural Catheter  Needle type: Tuohy   Needle gauge: 17  Needle length: 3.5 inches  Needle insertion depth: 4 cm  Catheter type: springwound and multi-orifice  Catheter size: 19 G  Catheter at skin depth: 10 cm  Insertion Attempts: 1  Test dose: 3 mL of lidocaine 1.5% with Epi 1-to-200,000  Additional Documentation: incremental injection, no paresthesia on injection, negative aspiration for heme and CSF, no significant pain on injection, no signs/symptoms of IV or SA injection and no significant complaints from patient  Needle localization: anatomical landmarks  Assessment  Upper dermatomal levels - Left: T10  Right: T10   Dermatomal levels determined by alcohol wipe  Ease of block: easy  Patient's tolerance of the procedure: comfortable throughout block and no complaints  Additional Notes  1.5 ml of 0.2% ropivicaine given intrathecal. No inadvertent dural puncture with  Smith.  Dural puncture performed with spinal needle.      Medications:    Medications: ropivacaine (NAROPIN) solution 0.2% - Epidural   4 mL - 9/19/2023 10:33:00 AM

## 2023-09-19 NOTE — SUBJECTIVE & OBJECTIVE
Obstetric HPI:  Patient reports Frequency: Every 2-3 minutes contractions, active fetal movement, No vaginal bleeding , Yes loss of fluid     This pregnancy has been complicated by anxiety, ADHD, bipolar    OB History    Para Term  AB Living   2 1 1 0 0 1   SAB IAB Ectopic Multiple Live Births   0 0 0 0 1      # Outcome Date GA Lbr Karlo/2nd Weight Sex Delivery Anes PTL Lv   2 Current            1 Term      Vag-Spont        Past Medical History:   Diagnosis Date    ADHD (attention deficit hyperactivity disorder)     Anemia     ASCUS of cervix with negative high risk HPV 2023    Bipolar disorder     Chicken pox     childhood    Headache     Heart palpitations     Hypotension     Insomnia     Migraine     Postpartum depression     Substance abuse     prior use, no current use    Syncope 2009    admitted x 1    Syncope      Past Surgical History:   Procedure Laterality Date    dental extraction ()      RHINOPLASTY      TONSILLECTOMY         Facility-Administered Medications Prior to Admission   Medication    onabotulinumtoxina injection 200 Units     PTA Medications   Medication Sig    lamoTRIgine (LAMICTAL) 100 MG tablet Take 1 tablet (100 mg total) by mouth every morning.    prenatal 114-iron a-g-folate 1 (PRENATE ELITE, IRON ASP GLYC,) 20 mg iron- 1 mg Tab Take 1 tablet by mouth every other day.    QUEtiapine (SEROQUEL) 200 MG Tab Take 1 tablet (200 mg total) by mouth every evening.       Review of patient's allergies indicates:   Allergen Reactions    Bactrim [sulfamethoxazole-trimethoprim]     Sulfa (sulfonamide antibiotics)         Family History       Problem Relation (Age of Onset)    Arthritis Paternal Grandmother    Asthma Paternal Grandmother    Bipolar disorder Mother    Breast cancer Paternal Grandmother (55), Mother    Cancer Paternal Grandfather, Paternal Grandmother, Mother    Cervical cancer Mother    Depression Paternal Grandmother    Heart block Paternal Grandfather     Heart disease Paternal Grandfather    Hyperlipidemia Paternal Grandfather, Paternal Grandmother, Father    Hypertension Paternal Grandfather, Paternal Grandmother    Mental illness Mother    Skin cancer Mother    Stroke Paternal Grandmother          Tobacco Use    Smoking status: Former     Types: Vaping with nicotine    Smokeless tobacco: Never   Substance and Sexual Activity    Alcohol use: Not Currently     Comment: 1-2 drinks lper week    Drug use: Not Currently     Types: Amphetamines, Marijuana     Comment: narcotics, ecstacy (all prior use)    Sexual activity: Not Currently     Partners: Male     Birth control/protection: None     Comment: birth control pill     Review of Systems   Gastrointestinal:  Positive for abdominal pain.   Genitourinary:  Positive for vaginal discharge.        Reports SROM 40 min before arrival to hospital   All other systems reviewed and are negative.     Objective:     Vital Signs (Most Recent):    Vital Signs (24h Range):           There is no height or weight on file to calculate BMI.    FHT: 130Cat 1 (reassuring)  TOCO:  Q 2-3 minutes     Physical Exam:   Constitutional: She is oriented to person, place, and time. She appears well-developed and well-nourished.       Cardiovascular:  Normal rate.             Pulmonary/Chest: Effort normal. No respiratory distress.        Abdominal: Soft.     Genitourinary:    Vagina and uterus normal.             Musculoskeletal: Moves all extremeties.       Neurological: She is alert and oriented to person, place, and time.    Skin: Skin is warm and dry.         Cervix: per RN  Dilation:  5-6       Significant Labs:  Lab Results   Component Value Date    GROUPTRH A POS 09/19/2023    HEPBSAG Non-reactive 01/17/2023    STREPBCULT No Group B Streptococcus isolated 08/16/2023       I have personallly reviewed all pertinent lab results from the last 24 hours.

## 2023-09-19 NOTE — ANESTHESIA PREPROCEDURE EVALUATION
09/19/2023  Iris Johnson is a 28 y.o., female.      Pre-op Assessment    I have reviewed the Patient Summary Reports.     I have reviewed the Nursing Notes.    I have reviewed the Medications.     Review of Systems  Anesthesia Hx:  No problems with previous Anesthesia  History of prior surgery of interest to airway management or planning: Previous anesthesia: Epidural, General Denies Family Hx of Anesthesia complications.   Denies Personal Hx of Anesthesia complications.   Social:  Non-Smoker, No Alcohol Use    Hematology/Oncology:  Hematology Normal   Oncology Normal     EENT/Dental:EENT/Dental Normal   Cardiovascular:  Cardiovascular Normal Exercise tolerance: good  Denies Pacemaker.  Denies Hypertension.  NYHA Classification I ECG has been reviewed.    Pulmonary:  Pulmonary Normal    Renal/:  Renal/ Normal     Hepatic/GI:  Hepatic/GI Normal    Musculoskeletal:  Musculoskeletal Normal    Neurological:   Neuromuscular Disease, Headaches    Endocrine:  Endocrine Normal    Psych:   Psychiatric History anxiety depression  Attention Deficit Disorder. Psychotic Disorder and Bipolar disorder.          Physical Exam  General: Well nourished, Cooperative, Alert and Oriented    Airway:  Mallampati: II   Mouth Opening: Normal  TM Distance: Normal  Tongue: Normal  Neck ROM: Normal ROM    Dental:  Intact        Anesthesia Plan  Type of Anesthesia, risks & benefits discussed:    Anesthesia Type: Epidural  Intra-op Monitoring Plan: Standard ASA Monitors  Post Op Pain Control Plan: epidural analgesia  Informed Consent: Informed consent signed with the Patient and all parties understand the risks and agree with anesthesia plan.  All questions answered.   ASA Score: 2  Day of Surgery Review of History & Physical: I have interviewed and examined the patient. I have reviewed the patient's H&P dated:     Ready For Surgery  From Anesthesia Perspective.     .

## 2023-09-19 NOTE — PROGRESS NOTES
Patient arrived to  unit at 1005.  1027: sitting up for epidural   1047: CNM called to bedside for decels. FHT in the 80-90s upon entry to room. VE 9-10/90/0. Patient repositioned to allow for FHT to return to baseline. Variability remains moderate.   1052: FHT decreased to 60s. VE: complete +2  station.   1055: MD called to bedside for vacuum assisted delivery. Patient pushing with descent toward vaginal delivery. NICU called to delivery as well

## 2023-09-19 NOTE — LACTATION NOTE
"Lactation rounds: Mother reports first breastfeeding session went well. She denies pain and reports infant was satisfied at the end of the feeding.    Mother verbalizes understanding of expected  behaviors and output for the first 48 hours of life.  Discussed the importance of cue based feedings on demand, unrestricted access to the breast, and frequent uninterrupted skin to skin contact.  Risk and implications of artificial nipples and non medically indicated formula supplementation discussed.      Mother has received a double electric breast pump for home use from her insurance company.    Medication safety discussed. Lamictal and Seroquel are L2. Mother was on adderall prior to pregnancy and would like to resume if able. Adderall is an L3. [facility] Med[facility] and Mother's Milk information printed and provided to mother. Also discussed Botox as an L3. Mother to talk to her pediatrician, psychiatrist and neurologist to determine what medications they feel are safe for breastfeeding.     Mother states she has been off of all street drugs for 10 years. She states she uses medical marijuana. According to Bravo López 17th edition of Medications & Mothers Milk, marijuana exposure to the infant, via breast milk, is considered possibly hazardous. "Both human and animal studies suggest that early exposure to cannabis may not be benign and that cannabis exposure in the  period may produce long-term changes in the mental and motor development. While this data poses numerous limitations, and noes not directly examine the benefits of breast milk versus the risk of exposure to marijuana in milk, cannabis use in breastfeeding mothers should be strongly discouraged at this time," (Mei 2017). This information was discussed with the mother, mother verbalizes understanding.     Mother denies any further lactation needs or concerns at this time. Encouraged mother to call for assistance when desired or when infant is " showing signs of hunger. Mother verbalizes understanding of all education and counseling.

## 2023-09-19 NOTE — L&D DELIVERY NOTE
[facility] - Labor & Delivery  Vaginal Delivery   Operative Note    SUMMARY     MD called to beside for NRFHT. Vacuum assisted vaginal delivery of live male infant, 1 application and 1 pop off. skin to skin was unable to be performed due to need for NICU to assess and provide  resuscitation .  Infant delivered position OA with episiotomy done for NRFHT .  Nuchal cord: No.    Spontaneous delivery of placenta and IV pitocin given noting good uterine tone.  2nd degree laceration noted and repaired in normal fashion with 3.0 chromic .  Patient tolerated delivery well. Sponge needle and lap counted correctly x2.    Indications: Normal labor  Pregnancy complicated by:   Patient Active Problem List   Diagnosis    Intractable chronic migraine without aura and without status migrainosus    Anxiety    Attention deficit hyperactivity disorder (ADHD), predominantly inattentive type    Bipolar disorder    CTS (carpal tunnel syndrome)    Insomnia    Migraine    ASCUS of cervix with negative high risk HPV    Encounter for supervision of normal pregnancy in third trimester    Normal labor     Admitting GA: 40w6d    Delivery Information for Bonifacio Johnson    Birth information:  YOB: 2025   Time of birth: 11:02 AM   Sex: male   Head Delivery Date/Time:     Delivery type:    Gestational Age: 40w6d        Delivery Providers    Delivering clinician:            Measurements    Weight:   Length:          Apgars    Living status: Living  Apgar Component Scores:  1 min.:  5 min.:  10 min.:  15 min.:  20 min.:    Skin color:  0  1       Heart rate:  2  2       Reflex irritability:  2  2       Muscle tone:  2  2       Respiratory effort:  2  2       Total:  8  9       Apgars assigned by: NICKI LOPEZ RN                         Interventions/Resuscitation           Cord    No data filed       Placenta    Placenta delivery date/time: 2023 1112  Placenta removal: Spontaneous  Placenta appearance: Intact  Placenta  disposition: Discarded           Labor Events:       labor:       Labor Onset Date/Time:         Dilation Complete Date/Time:         Start Pushing Date/Time:         Start Pushing Date/Time:       Rupture Date/Time:            Rupture type:          Fluid Amount:       Fluid Color:                steroids:       Antibiotics given for GBS:       Induction:       Indications for induction:        Augmentation:       Indications for augmentation:       Labor complications:       Additional complications:          Cervical ripening:                     Delivery:      Episiotomy:       Indication for Episiotomy:       Perineal Lacerations:   Repaired:      Periurethral Laceration:   Repaired:     Labial Laceration:   Repaired:     Sulcus Laceration:   Repaired:     Vaginal Laceration:   Repaired:     Cervical Laceration:   Repaired:     Repair suture:       Repair # of packets:       Last Value - EBL - Nursing (mL):       Sum - EBL - Nursing (mL): 0     Last Value - EBL - Anesthesia (mL):      Calculated QBL (mL):       Vaginal Sweep Performed:       Surgicount Correct:       Vaginal Packing:   Quantity:       Other providers:            Details (if applicable):  Trial of Labor      Categorization:      Priority:     Indications for :     Incision Type:       Additional  information:  Forceps:    Vacuum:    Breech:    Observed anomalies    Other (Comments):

## 2023-09-19 NOTE — HOSPITAL COURSE
VE 5-6cm  Admit to LD for labor  Epidural per patient request   UDS on admit  PPD #1, normal PP course, d/c home tomorrow  9/21/23:PPD2, doing really well this am, strict d/c instructions given

## 2023-09-20 ENCOUNTER — PATIENT MESSAGE (OUTPATIENT)
Dept: OBSTETRICS AND GYNECOLOGY | Facility: CLINIC | Age: 28
End: 2023-09-20
Payer: COMMERCIAL

## 2023-09-20 PROCEDURE — 25000003 PHARM REV CODE 250

## 2023-09-20 PROCEDURE — 11000001 HC ACUTE MED/SURG PRIVATE ROOM

## 2023-09-20 RX ADMIN — HYDROCODONE BITARTRATE AND ACETAMINOPHEN 1 TABLET: 5; 325 TABLET ORAL at 03:09

## 2023-09-20 RX ADMIN — HYDROCODONE BITARTRATE AND ACETAMINOPHEN 1 TABLET: 5; 325 TABLET ORAL at 07:09

## 2023-09-20 RX ADMIN — IBUPROFEN 600 MG: 600 TABLET, FILM COATED ORAL at 01:09

## 2023-09-20 RX ADMIN — IBUPROFEN 600 MG: 600 TABLET, FILM COATED ORAL at 08:09

## 2023-09-20 RX ADMIN — IBUPROFEN 600 MG: 600 TABLET, FILM COATED ORAL at 07:09

## 2023-09-20 RX ADMIN — PRENATAL VITAMINS-IRON FUMARATE 27 MG IRON-FOLIC ACID 0.8 MG TABLET 1 TABLET: at 08:09

## 2023-09-20 RX ADMIN — HYDROCODONE BITARTRATE AND ACETAMINOPHEN 1 TABLET: 5; 325 TABLET ORAL at 01:09

## 2023-09-20 NOTE — LACTATION NOTE
Mother reports that the baby is not opening wide when latching. Education provided on the importance of a deep, asymmetrical latch and ways to achieve. Instructed mother to call for next nursing session to assist with latching.    Infants weight loss wnl. Infants intake and output wnl. Reinforced infant feeding & output pattern, cue based feeds & unrestricted access to the breast. Instructed mother to feed 8 or more times in 24 hours. Hand expression reviewed. Benefits of skin to skin and rooming in discussed.     Mother was taught hand expression of breastmilk/colostrum. She was instructed to:  Sit upright and lean forward, if possible.  When feasible, apply warm, wet compress over breasts for a few minutes.   Perform gentle breast massage.  Form a C with her hand and place it about 1 inch back from the areola with the nipple centered between her index finger and her thumb.  Press, compress, relax:  Using her finger and thumb, apply pressure in an inward direction toward the breast without stretching the tissue, compress the breast tissue between her finger and thumb, then relax her finger and thumb. Repeat process for a few minutes.  Rotate placement of finger and thumb on the breasts to facilitate emptying.  Collect expressed breastmilk/colostrum with a spoon or cup and feed immediately to the baby, if able.  If unable to feed immediately, place breastmilk/colostrum directly into a sterile storage container for later use. Place the babys breast milk label (with the date and time of collection and the names of mother's medications) on the container. Reviewed proper handling and storage of expressed breastmilk.   Patient effectively return demonstrated and verbalized understanding.     Mother verbalizes understanding of all education and counseling. Mother denies any further lactation needs or concerns at this time. Discussed lactation availability. Encouraged mother to call for assistance when needs arise.

## 2023-09-20 NOTE — NURSING
Informed pediatrician that patient wanted to take her personal seroquel 200 mg from home that she has with her and baby is breastfeeding. Dr. Rivero said that baby will be fine if mom takes it and she can breast feed.

## 2023-09-20 NOTE — PLAN OF CARE
Patient afebrile and had no falls this shift. Fundus firm without massage and below umbilicus. Bleeding light, no clots passed this shift. Voids spontaneously. Ambulates independently. Pain well controlled with oral pain medication. Vital signs stable at this time. Bonding well with infant; responds to infant cues and participates in infant care. Patient plan of care ongoing.       Problem: Adult Inpatient Plan of Care  Goal: Plan of Care Review  Outcome: Ongoing, Progressing  Goal: Patient-Specific Goal (Individualized)  Outcome: Ongoing, Progressing  Goal: Absence of Hospital-Acquired Illness or Injury  Outcome: Ongoing, Progressing  Goal: Optimal Comfort and Wellbeing  Outcome: Ongoing, Progressing  Goal: Readiness for Transition of Care  Outcome: Ongoing, Progressing     Problem: Infection  Goal: Absence of Infection Signs and Symptoms  Outcome: Ongoing, Progressing     Problem:  Fall Injury Risk  Goal: Absence of Fall, Infant Drop and Related Injury  Outcome: Ongoing, Progressing     Problem: Skin Injury Risk Increased  Goal: Skin Health and Integrity  Outcome: Ongoing, Progressing     Problem: Adjustment to Role Transition (Postpartum Vaginal Delivery)  Goal: Successful Maternal Role Transition  Outcome: Ongoing, Progressing     Problem: Bleeding (Postpartum Vaginal Delivery)  Goal: Hemostasis  Outcome: Ongoing, Progressing     Problem: Infection (Postpartum Vaginal Delivery)  Goal: Absence of Infection Signs/Symptoms  Outcome: Ongoing, Progressing     Problem: Pain (Postpartum Vaginal Delivery)  Goal: Acceptable Pain Control  Outcome: Ongoing, Progressing     Problem: Urinary Retention (Postpartum Vaginal Delivery)  Goal: Effective Urinary Elimination  Outcome: Met

## 2023-09-20 NOTE — LACTATION NOTE
Lactation Rounds:  Mother reports that infant is latching well. Observed mother latch infant in cradle hold on the left breast. Infant has a nutritive suck and audible swallows noted. Mother denies any pain. Encouraged mother to call for further assistance as needed.

## 2023-09-20 NOTE — PLAN OF CARE
[facility])  Discharge Assessment    Primary Care Provider: Gurinder Perez MD     OB Screen (most recent)       OB Screen - 23 0901          OB SCREEN    Assessment Type Discharge Planning Assessment     Source of Information patient;health record     Received Prenatal Care Yes     Any indications/suspicions for None     Is this a teen pregnancy No     Is the baby in NICU No     Indication for adoption/Safe Haven No     Indication for DME/post-acute needs No     HIV (+) No     Any congenital  disorders No     Fetal demise/ death No

## 2023-09-21 VITALS
WEIGHT: 142 LBS | OXYGEN SATURATION: 100 % | RESPIRATION RATE: 18 BRPM | TEMPERATURE: 98 F | BODY MASS INDEX: 27.88 KG/M2 | HEIGHT: 60 IN | SYSTOLIC BLOOD PRESSURE: 116 MMHG | DIASTOLIC BLOOD PRESSURE: 67 MMHG | HEART RATE: 81 BPM

## 2023-09-21 PROBLEM — Z34.93 ENCOUNTER FOR SUPERVISION OF NORMAL PREGNANCY IN THIRD TRIMESTER: Status: RESOLVED | Noted: 2023-06-07 | Resolved: 2023-09-21

## 2023-09-21 PROCEDURE — 99024 PR POST-OP FOLLOW-UP VISIT: ICD-10-PCS | Mod: ,,, | Performed by: ADVANCED PRACTICE MIDWIFE

## 2023-09-21 PROCEDURE — 99024 POSTOP FOLLOW-UP VISIT: CPT | Mod: ,,, | Performed by: ADVANCED PRACTICE MIDWIFE

## 2023-09-21 PROCEDURE — 25000003 PHARM REV CODE 250: Performed by: OBSTETRICS & GYNECOLOGY

## 2023-09-21 PROCEDURE — 25000003 PHARM REV CODE 250

## 2023-09-21 RX ORDER — IBUPROFEN 600 MG/1
600 TABLET ORAL EVERY 6 HOURS PRN
Qty: 30 TABLET | Refills: 3 | Status: SHIPPED | OUTPATIENT
Start: 2023-09-21 | End: 2024-01-03

## 2023-09-21 RX ORDER — BUTALBITAL, ACETAMINOPHEN AND CAFFEINE 50; 325; 40 MG/1; MG/1; MG/1
1 TABLET ORAL EVERY 6 HOURS PRN
Status: DISCONTINUED | OUTPATIENT
Start: 2023-09-21 | End: 2023-09-21 | Stop reason: HOSPADM

## 2023-09-21 RX ADMIN — HYDROCODONE BITARTRATE AND ACETAMINOPHEN 1 TABLET: 5; 325 TABLET ORAL at 02:09

## 2023-09-21 RX ADMIN — IBUPROFEN 600 MG: 600 TABLET, FILM COATED ORAL at 08:09

## 2023-09-21 RX ADMIN — HYDROCODONE BITARTRATE AND ACETAMINOPHEN 1 TABLET: 5; 325 TABLET ORAL at 12:09

## 2023-09-21 RX ADMIN — IBUPROFEN 600 MG: 600 TABLET, FILM COATED ORAL at 02:09

## 2023-09-21 RX ADMIN — PRENATAL VITAMINS-IRON FUMARATE 27 MG IRON-FOLIC ACID 0.8 MG TABLET 1 TABLET: at 08:09

## 2023-09-21 RX ADMIN — BUTALBITAL, ACETAMINOPHEN, AND CAFFEINE 1 TABLET: 325; 50; 40 TABLET ORAL at 10:09

## 2023-09-21 NOTE — PROGRESS NOTES
[facility] ([facility])  Obstetrics  Postpartum Progress Note    Patient Name: Iris Johnson  MRN: [id number]  Admission Date: 9/19/2023  Hospital Length of Stay: 2 days  Attending Physician: Sabra Head MD  Primary Care Provider: Gurinder Perez MD    Subjective:     Principal Problem:Vacuum-assisted vaginal delivery    Hospital Course:  VE 5-6cm  Admit to  for labor  Epidural per patient request   UDS on admit  PPD #1, normal PP course, d/c home tomorrow      Interval History: PPD 1    She is doing well this morning. She is tolerating a regular diet without nausea or vomiting. She is voiding spontaneously. She is ambulating. She has passed flatus, and has not a BM. Vaginal bleeding is mild. She denies fever or chills. Abdominal pain is mild and controlled with oral medications. She Is breastfeeding.   Objective:     Vital Signs (Most Recent):  Temp: 98 °F (36.7 °C) (09/21/23 0432)  Pulse: 98 (09/21/23 0432)  Resp: 18 (09/21/23 0432)  BP: (!) 99/49 (09/21/23 0432)  SpO2: 100 % (09/21/23 0013) Vital Signs (24h Range):  Temp:  [98 °F (36.7 °C)-98.5 °F (36.9 °C)] 98 °F (36.7 °C)  Pulse:  [81-98] 98  Resp:  [18] 18  SpO2:  [100 %] 100 %  BP: ()/(49-66) 99/49     Weight: 64.4 kg (142 lb)  Body mass index is 27.73 kg/m².    No intake or output data in the 24 hours ending 09/21/23 0525      Significant Labs:  Lab Results   Component Value Date    GROUPTRH A POS 09/19/2023    HEPBSAG Non-reactive 01/17/2023    STREPBCULT No Group B Streptococcus isolated 08/16/2023     Recent Labs   Lab 09/19/23  1005   HGB 11.8*   HCT 35.7*       I have personallly reviewed all pertinent lab results from the last 24 hours.  Recent Lab Results       None            Physical Exam:   Constitutional: She is oriented to person, place, and time. She appears well-developed and well-nourished.    HENT:   Head: Normocephalic.      Cardiovascular:  Normal rate.             Pulmonary/Chest: Effort normal.        Abdominal:  Soft.     Genitourinary: There is vaginal discharge in the vagina.    Genitourinary Comments:  lochia small/mod per pt             Musculoskeletal: Normal range of motion and moves all extremeties.       Neurological: She is alert and oriented to person, place, and time.    Skin: Skin is warm and dry.    Psychiatric: She has a normal mood and affect. Her behavior is normal. Judgment and thought content normal.       Review of Systems   Eyes:  Negative for visual disturbance.   Gastrointestinal:  Positive for abdominal pain.   Genitourinary:  Positive for vaginal bleeding.   Neurological:  Negative for headaches.   All other systems reviewed and are negative.      Assessment/Plan:     28 y.o. female  for:    * Vacuum-assisted vaginal delivery  Routine PP care    Obstetrical laceration, second degree  ROutine Perineal care    Single liveborn  Care of infant per peds    Bipolar disorder  .        Disposition: As patient meets milestones, will plan to discharge tomorrow.    Tim Devi CNM  Obstetrics  [facility] ([facility])

## 2023-09-21 NOTE — PLAN OF CARE
Patient afebrile and had no falls this shift. Fundus firm without massage and 2cm below umbilicus. Bleeding light, no clots passed this shift. Voids spontaneously. Ambulates independently. Pain well controlled with oral pain medication. Vital signs stable at this time. Bonding well with infant; responds to infant cues and participates in infant care. Call light in reach, encourage usage as needed. No problems indicated at this time.

## 2023-09-21 NOTE — LACTATION NOTE
Lactation Rounds:    Infants weight loss wnl. Infants intake and output wnl. Mother has no concerns with breastfeeding at this time. Breastfeeding discharge education performed. Informed mother of the World Health Organization's recommendation for exclusive breastfeeding for the first 6 months of baby's life and continued breastfeeding after the introduction of solid foods for 2 years and beyond.     Also informed mother of the American Academy of Pediatric's recommendation for baby to be examined by pediatrician or other qualified HCP within 2-4 days of discharge and again at the 2nd week of life. Discussed baby's appropriate intake and output, adequate weight gain patterns for baby, and how to seek the assistance of a qualified healthcare professional for concerns related to  feeding.     Written instructions have been provided and were reviewed at this time. Hand expression reviewed, mother able to return demonstrate. Lactation discharge booklet reviewed.  Mother is aware of warm line, outpatient consultations, community resources and monthly support groups. Encouraged mother to contact lactation with any questions, concerns, or problems. Contact numbers provided, and mother verbalizes understanding.     breastfeeding observation:   Position:  right breast [x] cross cradle [] cradle []football [] laid-back   depth  [] shallow [x] moderate [] deep    latch [x] successful []unsuccessful [] full assistance [] difficulty finding nipple   gape [] narrow []adequate [x] wide    lip flange [x]Top lip flanged []top lip tucked [x] bottom lip flanged [] Bottom lip tucked   oral seal [x] adequate []poor     cheeks [x] round []dimpled [] broken cheek line    jaw [x] piston [x]rocker [] chomping []tremors   maternal pain [x] none []mild [] moderate [] severe   Nipple vasospasm [x] no []yes     Radiating nipple pain [x] no []yes     swallow [] visible [x]audible [] gulping    swallow rate [] 2:1 [x]high suck to swallow  [] frequent pauses []variable   difficulties [] milk leaking []Choking/coughing [] arching [] Unsustained tongue extension    [] clicking []crease line above   upper lip [] lip blanching [] fatigue     [] labored breathing []nasal flaring []inspiratory stridor []Riding letdown    [x] popoffs [] Other:      nipple shape after feeding [x] WNL [] lipstick [] compressed [] white line   Baby after feeding [] content [] sleepy [x] showing feeding cues [] alert    []fatigued [] fussy [] Other:     Infant just returned from circumcision and was fussy. This could be the cause of pop offs as mother states infant usually remains on the breast well. High suck to swallow ratio is normal in the first few days.     breastfeeding observation:   Position:  left breast [x] cross cradle [] cradle []football [] laid-back   depth  [] shallow [x] moderate [] deep    latch [x] successful []unsuccessful [] full assistance [] difficulty finding nipple   gape [] narrow [x]adequate [] wide    lip flange [x]Top lip flanged []top lip tucked [x] bottom lip flanged [] Bottom lip tucked   oral seal [x] adequate []poor     cheeks [x] round []dimpled [] broken cheek line    jaw [x] piston [x]rocker [] chomping []tremors   maternal pain [x] none []mild [] moderate [] severe   Nipple vasospasm [x] no []yes     Radiating nipple pain [x] no []yes     swallow [] visible [x]audible [] gulping    swallow rate [] 2:1 [x]high suck to swallow [] frequent pauses []variable   difficulties [] milk leaking []Choking/coughing [] arching [] Unsustained tongue extension    [] clicking []crease line above   upper lip [] lip blanching [] fatigue     [] labored breathing []nasal flaring []inspiratory stridor []Riding letdown    [] popoffs [] Other:      nipple shape after feeding [x] WNL [] lipstick [] compressed [] white line   Baby after feeding [x] content [] sleepy [] showing feeding cues [] alert    []fatigued [] fussy [] Other:      High suck to swallow ratio is  normal in the first few days.

## 2023-09-21 NOTE — DISCHARGE SUMMARY
"[facility] ([facility])  Obstetrics  Discharge Summary      Patient Name: Iris Johnson  MRN: [id number]  Admission Date: 2023  Hospital Length of Stay: 2 days  Discharge Date and Time: No discharge date for patient encounter.  Attending Physician: Sabra Head MD   Discharging Provider: Paula Rondon CNM   Primary Care Provider: Gurinder Perez MD    HPI: 28 y.o.  40w6d with c/o contractions            * No surgery found *     Hospital Course:   VE 5-6cm  Admit to  for labor  Epidural per patient request   UDS on admit  PPD #1, normal PP course, d/c home tomorrow  23:PPD2, doing really well this am, strict d/c instructions given           Final Active Diagnoses:    Diagnosis Date Noted POA    PRINCIPAL PROBLEM:  Vacuum-assisted vaginal delivery [Z37.9] 2023 No    Single liveborn [Z38.2] 2023 No    Obstetrical laceration, second degree [O70.1] 2023 No    Bipolar disorder [F31.9] 2022 Yes      Problems Resolved During this Admission:    Diagnosis Date Noted Date Resolved POA    Normal labor [O80, Z37.9] 2023 Not Applicable        Significant Diagnostic Studies: Labs: All labs within the past 24 hours have been reviewed      Feeding Method: bottle    Immunizations     Date Immunization Status Dose Route/Site Given by    23 1240 MMR Incomplete 0.5 mL Subcutaneous/     23 1240 Tdap Incomplete 0.5 mL Intramuscular/           Delivery:    Episiotomy: Median   Lacerations: 2nd   Repair suture:     Repair # of packets: 1   Blood loss (ml):       Birth information:  YOB: 2025   Time of birth: 11:02 AM   Sex: male   Delivery type: Vaginal, Spontaneous   Gestational Age: 40w6d     Measurements    Weight: 3500 g  Weight (lbs): 7 lb 11.5 oz  Length: 52.1 cm  Length (in): 20.5"  Head circumference: 33 cm  Chest circumference: 32.5 cm  Abdominal girth: 32 cm         Delivery Clinician: Delivery Providers    Delivering " clinician: Sabra Head MD   Provider Role    Bhumika Stephen CNM Midwife    Becki Grey RN Registered Nurse    Ting Reid RN Registered Nurse    Ko, Ade Sibley, RN Registered Nurse    Prince Cartwright, [facility]    Bruce Michaud, NNP Nurse Practitioner           Additional  information:  Forceps:    Vacuum:    Breech:    Observed anomalies      Living?:     Apgars    Living status: Living  Apgar Component Scores:  1 min.:  5 min.:  10 min.:  15 min.:  20 min.:    Skin color:  0  1       Heart rate:  2  2       Reflex irritability:  2  2       Muscle tone:  2  2       Respiratory effort:  2  2       Total:  8  9       Apgars assigned by: NICKI LOPEZ RN         Placenta: Delivered:       appearance    Pending Diagnostic Studies:     None          Discharged Condition: stable    Disposition: Home or Self Care    Follow Up:   Follow-up Information     Michael Atkins CNM Follow up in 4 week(s).    Specialty: Obstetrics and Gynecology  Why: postpartum visit  Contact information:  [address]  [place] [address] [phone]                       Patient Instructions:      Notify your health care provider if you experience any of the following:  temperature >100.4     Notify your health care provider if you experience any of the following:  persistent nausea and vomiting or diarrhea     Notify your health care provider if you experience any of the following:  severe uncontrolled pain     Notify your health care provider if you experience any of the following:  redness, tenderness, or signs of infection (pain, swelling, redness, odor or green/yellow discharge around incision site)     Notify your health care provider if you experience any of the following:  difficulty breathing or increased cough     Notify your health care provider if you experience any of the following:  persistent dizziness, light-headedness, or visual disturbances     Medications:  Current Discharge  Medication List      START taking these medications    Details   ibuprofen (ADVIL,MOTRIN) 600 MG tablet Take 1 tablet (600 mg total) by mouth every 6 (six) hours as needed for Pain.  Qty: 30 tablet, Refills: 3         CONTINUE these medications which have NOT CHANGED    Details   lamoTRIgine (LAMICTAL) 100 MG tablet Take 1 tablet (100 mg total) by mouth every morning.  Qty: 30 tablet, Refills: 3    Associated Diagnoses: Bipolar disease during pregnancy in third trimester      prenatal 114-iron a-g-folate 1 (PRENATE ELITE, IRON ASP GLYC,) 20 mg iron- 1 mg Tab Take 1 tablet by mouth every other day.      QUEtiapine (SEROQUEL) 200 MG Tab Take 1 tablet (200 mg total) by mouth every evening.  Qty: 30 tablet, Refills: 3    Associated Diagnoses: Attention deficit hyperactivity disorder (ADHD), combined type             Paula Rondon CNM  Obstetrics  [facility] ([facility])

## 2023-09-21 NOTE — PROGRESS NOTES
Went to bedside to consent for circumcision.  Pt wearing sunglasses complaining of a migraine since waking up this morning.  Has a long hx of migraines.  Ibuprofen not helping.  No visual disturbances.  /67.  Fioricet for migraine.

## 2023-09-21 NOTE — SUBJECTIVE & OBJECTIVE
Interval History: PPD 1    She is doing well this morning. She is tolerating a regular diet without nausea or vomiting. She is voiding spontaneously. She is ambulating. She has passed flatus, and has not a BM. Vaginal bleeding is mild. She denies fever or chills. Abdominal pain is mild and controlled with oral medications. She Is breastfeeding.   Objective:     Vital Signs (Most Recent):  Temp: 98 °F (36.7 °C) (09/21/23 0432)  Pulse: 98 (09/21/23 0432)  Resp: 18 (09/21/23 0432)  BP: (!) 99/49 (09/21/23 0432)  SpO2: 100 % (09/21/23 0013) Vital Signs (24h Range):  Temp:  [98 °F (36.7 °C)-98.5 °F (36.9 °C)] 98 °F (36.7 °C)  Pulse:  [81-98] 98  Resp:  [18] 18  SpO2:  [100 %] 100 %  BP: ()/(49-66) 99/49     Weight: 64.4 kg (142 lb)  Body mass index is 27.73 kg/m².    No intake or output data in the 24 hours ending 09/21/23 0525      Significant Labs:  Lab Results   Component Value Date    GROUPTRH A POS 09/19/2023    HEPBSAG Non-reactive 01/17/2023    STREPBCULT No Group B Streptococcus isolated 08/16/2023     Recent Labs   Lab 09/19/23  1005   HGB 11.8*   HCT 35.7*       I have personallly reviewed all pertinent lab results from the last 24 hours.  Recent Lab Results       None            Physical Exam:   Constitutional: She is oriented to person, place, and time. She appears well-developed and well-nourished.    HENT:   Head: Normocephalic.      Cardiovascular:  Normal rate.             Pulmonary/Chest: Effort normal.        Abdominal: Soft.     Genitourinary: There is vaginal discharge in the vagina.    Genitourinary Comments:  lochia small/mod per pt             Musculoskeletal: Normal range of motion and moves all extremeties.       Neurological: She is alert and oriented to person, place, and time.    Skin: Skin is warm and dry.    Psychiatric: She has a normal mood and affect. Her behavior is normal. Judgment and thought content normal.       Review of Systems   Eyes:  Negative for visual disturbance.    Gastrointestinal:  Positive for abdominal pain.   Genitourinary:  Positive for vaginal bleeding.   Neurological:  Negative for headaches.   All other systems reviewed and are negative.

## 2023-09-21 NOTE — DISCHARGE INSTRUCTIONS
"Mother Self Care:    Activity: Avoid strenuous exercise and get adequate rest.  No driving until the physician consent given.  Emotional Changes: Most women find birth to be a time of great emotional upheaval.  Sense of loss, mood swings, fatigue, anxiety, and feeling "let down" are common.  If feelings worsen or last more than a week, call your physician.  Breast Care/Breastfeeding: Wear a bra for comfort.  Keep nipples dry and apply your own breast milk or lanolin cream as needed for soreness.  Engorgement can be relieved with warm, moist heat before feedings.  You may also take Ibuprofen.  Breast Care/Bottle Feeding: Wear support bra 24 hours a day for one week.  Avoid stimulation to breasts.  You may use ice packs for discomfort.  Francis-Care/Vaginal Bleeding: Remember to use your francis-bottle after urinating.  Your flow will change from red, to pink, to yellow/white color over a period of 2 weeks.  Menstruation will return in 3-8 weeks, or longer if breastfeeding.  Episiotomy Vaginal Delivery: Stitches will dissolve within 10 days to 3 weeks.  Warm baths, tucks, and dermoplast will promote healing.  Avoid bubble baths or strong soaps.  Sexual Activity/Pelvic Rest: No sexual activity, tampons, or douching until your physician gives you consent.  Diet: Continue to eat from the five basic food groups, including plenty of protein, fruits, vegetables, and whole grains.  Limit empty calories and high fat foods.  Drink enough fluids to satisfy thirst and add an extra 500 calories for breastfeeding.  Constipation/Hemorrhoids: Drink plenty of water.  You may take a stool softener or natural laxative (Metamucil). You may use tucks or hemorrhoid ointment and soak in a warm tub.    CALL YOUR OB DOCTOR IF ANY OF THE FOLLOWING OCCURS:  *Heavy bleeding - saturating a pad an hour or passing any large (2-3 inches in size) blood clots.  *Any pain, redness, or tenderness in lower leg.  *You cannot care for yourself or your " baby.  *Any signs of infection-      - Temperature greater than 100.5 degrees F      - Foul smelling vaginal discharge and/or incisional drainage      - Increased episiotomy or incisional pain      - Hot, hard, red or sore area on breast      - Flu-like symptoms      - Any urgency, frequency or burning with urination    Return To the Hospital for further Evaluation:  Headache not relieved by tylenol or ibuprofen  Blurry vision, double vision, seeing spots, or flashing lights  Feeling faint or passing out  Right epigastric pain  Difficulty breathing  Swelling in hands, face, or feet  Any of these symptoms accompanied by nausea/vomiting  Gaining more than 5 pounds in one week  Seizures  These symptoms could be an indication of elevated blood pressure.     For patients that were treated for high blood pressure during pregnancy and or your hospital stay you will need a blood pressure check three days after you leave the hospital. Your nursing staff will assist you in an appointment if needed. If you have Connected Mom you may use your blood pressure cuff and report any readings 140/90 to your provider immediately.       If you have any questions that need to be answered immediately please call the Labor & Delivery Unit at [phone] and ask to speak to a nurse.      Please see [facility] BL[facility] folder for additional information and handouts.

## 2023-09-24 ENCOUNTER — PATIENT MESSAGE (OUTPATIENT)
Dept: NEUROLOGY | Facility: CLINIC | Age: 28
End: 2023-09-24
Payer: COMMERCIAL

## 2023-09-25 ENCOUNTER — PATIENT MESSAGE (OUTPATIENT)
Dept: PRIMARY CARE CLINIC | Facility: CLINIC | Age: 28
End: 2023-09-25
Payer: COMMERCIAL

## 2023-10-16 ENCOUNTER — PATIENT MESSAGE (OUTPATIENT)
Dept: PSYCHIATRY | Facility: CLINIC | Age: 28
End: 2023-10-16
Payer: COMMERCIAL

## 2023-10-24 ENCOUNTER — PATIENT MESSAGE (OUTPATIENT)
Dept: PRIMARY CARE CLINIC | Facility: CLINIC | Age: 28
End: 2023-10-24
Payer: COMMERCIAL

## 2023-10-24 ENCOUNTER — POSTPARTUM VISIT (OUTPATIENT)
Dept: OBSTETRICS AND GYNECOLOGY | Facility: CLINIC | Age: 28
End: 2023-10-24
Payer: COMMERCIAL

## 2023-10-24 VITALS
WEIGHT: 129 LBS | DIASTOLIC BLOOD PRESSURE: 82 MMHG | BODY MASS INDEX: 25.32 KG/M2 | SYSTOLIC BLOOD PRESSURE: 126 MMHG | HEIGHT: 60 IN

## 2023-10-24 PROCEDURE — 99024 PR POST-OP FOLLOW-UP VISIT: ICD-10-PCS | Mod: S$GLB,,, | Performed by: ADVANCED PRACTICE MIDWIFE

## 2023-10-24 PROCEDURE — 99024 POSTOP FOLLOW-UP VISIT: CPT | Mod: S$GLB,,, | Performed by: ADVANCED PRACTICE MIDWIFE

## 2023-10-24 PROCEDURE — 99999 PR PBB SHADOW E&M-EST. PATIENT-LVL III: CPT | Mod: PBBFAC,,, | Performed by: ADVANCED PRACTICE MIDWIFE

## 2023-10-24 PROCEDURE — 99999 PR PBB SHADOW E&M-EST. PATIENT-LVL III: ICD-10-PCS | Mod: PBBFAC,,, | Performed by: ADVANCED PRACTICE MIDWIFE

## 2023-10-24 RX ORDER — B-COMPLEX WITH VITAMIN C
1 TABLET ORAL
COMMUNITY
Start: 2023-09-06

## 2023-10-24 RX ORDER — NORGESTIMATE AND ETHINYL ESTRADIOL 0.25-0.035
1 KIT ORAL DAILY
Qty: 30 TABLET | Refills: 11 | Status: SHIPPED | OUTPATIENT
Start: 2023-10-24 | End: 2023-10-31

## 2023-10-24 NOTE — PROGRESS NOTES
Subjective:       Iris Johnson is a 28 y.o. female who presents for a postpartum visit. She is 4 weeks postpartum following a spontaneous vaginal delivery. I have fully reviewed the prenatal and intrapartum course. The delivery was at 40.6 gestational weeks. Outcome: vacuum, outlet. Anesthesia: epidural. Postpartum course has been routine. Baby's course has been routine. Baby is feeding by breast. Bleeding no bleeding. Bowel function is normal. Bladder function is normal. Patient is sexually active. Contraception method is none. Postpartum depression screening: negative.    The following portions of the patient's history were reviewed and updated as appropriate: allergies, current medications, past family history, past medical history, past social history, past surgical history, and problem list.    Review of Systems  Pertinent items are noted in HPI.     Objective:      /82   Ht 5' (1.524 m)   Wt 58.5 kg (128 lb 15.5 oz)   LMP  (LMP Unknown)   Breastfeeding Yes   BMI 25.19 kg/m²    General:  alert, appears stated age, and cooperative    Breasts:  inspection negative, no nipple discharge or bleeding, no masses or nodularity palpable   Lungs: clear to auscultation bilaterally   Heart:  regular rate and rhythm, S1, S2 normal, no murmur, click, rub or gallop   Abdomen: soft, non-tender; bowel sounds normal; no masses,  no organomegaly    Vulva:  normal   Vagina: normal vagina, no discharge, exudate, lesion, or erythema   Cervix:  no cervical motion tenderness                      Assessment:      Routine postpartum exam. Pap smear not done at today's visit.     Plan:      1. Contraception: none  3. Follow up in: 1  year  or as needed.

## 2023-10-28 ENCOUNTER — PATIENT MESSAGE (OUTPATIENT)
Dept: OBSTETRICS AND GYNECOLOGY | Facility: CLINIC | Age: 28
End: 2023-10-28
Payer: COMMERCIAL

## 2023-10-28 DIAGNOSIS — Z30.011 ENCOUNTER FOR INITIAL PRESCRIPTION OF CONTRACEPTIVE PILLS: Primary | ICD-10-CM

## 2023-10-31 RX ORDER — ACETAMINOPHEN AND CODEINE PHOSPHATE 120; 12 MG/5ML; MG/5ML
1 SOLUTION ORAL DAILY
Qty: 30 TABLET | Refills: 11 | Status: SHIPPED | OUTPATIENT
Start: 2023-10-31 | End: 2024-10-30

## 2023-11-08 ENCOUNTER — PATIENT MESSAGE (OUTPATIENT)
Dept: NEUROLOGY | Facility: CLINIC | Age: 28
End: 2023-11-08
Payer: COMMERCIAL

## 2023-11-08 DIAGNOSIS — G43.719 INTRACTABLE CHRONIC MIGRAINE WITHOUT AURA AND WITHOUT STATUS MIGRAINOSUS: Primary | ICD-10-CM

## 2023-11-08 RX ORDER — RIMEGEPANT SULFATE 75 MG/75MG
75 TABLET, ORALLY DISINTEGRATING ORAL DAILY PRN
Qty: 8 TABLET | Refills: 11 | Status: SHIPPED | OUTPATIENT
Start: 2023-11-08

## 2023-11-27 ENCOUNTER — PROCEDURE VISIT (OUTPATIENT)
Dept: NEUROLOGY | Facility: CLINIC | Age: 28
End: 2023-11-27
Payer: COMMERCIAL

## 2023-11-27 VITALS
TEMPERATURE: 97 F | HEIGHT: 60 IN | BODY MASS INDEX: 25.32 KG/M2 | RESPIRATION RATE: 17 BRPM | DIASTOLIC BLOOD PRESSURE: 72 MMHG | HEART RATE: 91 BPM | SYSTOLIC BLOOD PRESSURE: 120 MMHG | WEIGHT: 129 LBS

## 2023-11-27 DIAGNOSIS — G43.719 INTRACTABLE CHRONIC MIGRAINE WITHOUT AURA AND WITHOUT STATUS MIGRAINOSUS: Primary | ICD-10-CM

## 2023-11-27 PROCEDURE — 64615 CHEMODENERV MUSC MIGRAINE: CPT | Mod: S$GLB,,, | Performed by: NURSE PRACTITIONER

## 2023-11-27 PROCEDURE — 64615 PR CHEMODENERVATION OF MUSCLE FOR CHRONIC MIGRAINE: ICD-10-PCS | Mod: S$GLB,,, | Performed by: NURSE PRACTITIONER

## 2023-11-27 NOTE — PROCEDURES
Procedures     BOTOX PROCEDURE    PROCEDURE PERFORMED: Botulinum toxin injection (08218)    CLINICAL INDICATION: G43.719    Cycle #8    A time out was conducted just before the start of the procedure to verify the correct patient and procedure, procedure location, and all relevant critical information.   Signed consent obtained prior to procedure     Conventional methods of treatment but the patient has been unresponsive and refractory.The patient meets criteria for chronic headaches according to the ICHD-III, the patient has more than 15 headaches a month at least 8 of them meet migraine criteria, which last for more than 4 hours a day.     Last Botox injections were 12 weeks ago and there has been over 50%  improvement in the patient's symptoms. Frequency of treatment is every 12 weeks unless no response to the treatments, at which time we will discontinue the injections.     DESCRIPTION OF PROCEDURE: After obtaining informed consent and under  aseptic technique, a total of 155 units of botulinum toxin type A were   injected in the following muscles: Procerus 5 units,  5 units  bilaterally, frontalis 20 units, temporalis 20 units bilaterally,  occipitalis 15 units, upper cervical paraspinals 10 units bilaterally and trapezius 15 units bilaterally. The patient was given a total of 155 units in 31 sites.    The patient tolerated the procedure well. There were no complications. The patient was given a prescription for repeat treatment in 12 weeks.     Unavoidable waste 45 units    RTC in 12 weeks.     SERA Goodman

## 2023-11-30 ENCOUNTER — OFFICE VISIT (OUTPATIENT)
Dept: PSYCHIATRY | Facility: CLINIC | Age: 28
End: 2023-11-30
Payer: COMMERCIAL

## 2023-11-30 ENCOUNTER — PATIENT MESSAGE (OUTPATIENT)
Dept: PSYCHIATRY | Facility: CLINIC | Age: 28
End: 2023-11-30

## 2023-11-30 DIAGNOSIS — Z78.9 BREASTFEEDING (INFANT): ICD-10-CM

## 2023-11-30 DIAGNOSIS — F90.2 ATTENTION DEFICIT HYPERACTIVITY DISORDER (ADHD), COMBINED TYPE: ICD-10-CM

## 2023-11-30 DIAGNOSIS — F31.9 BIPOLAR AFFECTIVE DISORDER, REMISSION STATUS UNSPECIFIED: Primary | ICD-10-CM

## 2023-11-30 PROCEDURE — 90833 PR PSYCHOTHERAPY W/PATIENT W/E&M, 30 MIN (ADD ON): ICD-10-PCS | Mod: 95,,, | Performed by: PSYCHOLOGIST

## 2023-11-30 PROCEDURE — 99215 OFFICE O/P EST HI 40 MIN: CPT | Mod: 95,,, | Performed by: PSYCHOLOGIST

## 2023-11-30 PROCEDURE — 90833 PSYTX W PT W E/M 30 MIN: CPT | Mod: 95,,, | Performed by: PSYCHOLOGIST

## 2023-11-30 PROCEDURE — 99215 PR OFFICE/OUTPT VISIT, EST, LEVL V, 40-54 MIN: ICD-10-PCS | Mod: 95,,, | Performed by: PSYCHOLOGIST

## 2023-11-30 RX ORDER — QUETIAPINE FUMARATE 200 MG/1
200 TABLET, FILM COATED ORAL NIGHTLY
Qty: 30 TABLET | Refills: 3 | Status: SHIPPED | OUTPATIENT
Start: 2023-11-30 | End: 2024-01-03 | Stop reason: SDUPTHER

## 2023-11-30 RX ORDER — LAMOTRIGINE 200 MG/1
200 TABLET ORAL EVERY MORNING
Qty: 30 TABLET | Refills: 3 | Status: SHIPPED | OUTPATIENT
Start: 2023-11-30 | End: 2024-01-03 | Stop reason: SDUPTHER

## 2023-11-30 RX ORDER — DEXTROAMPHETAMINE SACCHARATE, AMPHETAMINE ASPARTATE, DEXTROAMPHETAMINE SULFATE AND AMPHETAMINE SULFATE 2.5; 2.5; 2.5; 2.5 MG/1; MG/1; MG/1; MG/1
10 TABLET ORAL 2 TIMES DAILY
Qty: 60 TABLET | Refills: 0 | Status: SHIPPED | OUTPATIENT
Start: 2023-11-30 | End: 2024-01-03 | Stop reason: SDUPTHER

## 2023-11-30 NOTE — PATIENT INSTRUCTIONS

## 2023-11-30 NOTE — PROGRESS NOTES
"  Outpatient Psychiatry Follow-Up Visit     11/30/2023      Timeframe: Corona Virus Outbreak     The patient location is: Patient's home/ Patient reported that his/her location at the time of this visit was in the [facility]     Visit type: Virtual visit with synchronous audio and video     Each patient to whom he or she provides medical services by telehealth is: (1) informed of the relationship between the medical psychologist and patient and the respective role of any other health care provider with respect to management of the patient; and (2) notified that he or she may decline to receive medical services by telehealth and may withdraw from such care at any time.    I also informed patient of the following:   Tiffanie Galvez, PhD, MPAP:  [place] medical license number: [id number]    My contact info:  [facility] / 2nd Floor  [address] [address]   Ph: [phone]    If technology issues, call office phone: Ph: [phone]  If crisis: Dial 911 or go to nearest Emergency Room (ER)  If questions related to privacy practices: contact [employer] Information Department: [phone]    Clinical Status of Patient:  Outpatient (Ambulatory)    Chief Complaint:  Iris Johnson is a 28 y.o. female who presents today for follow-up of mood and inattention/distractibility .      Preferred Name: Iris  Gender Identity: cis female  Preferred Pronouns: she/her    Impressions/Plan from last visit: Iris attended her visit. She said that she eats a lot--wakes up hungry and drinks Ensure drinks; has coffee; eats 4-5 times a day. She is still not sleeping well--wakes up a lot during the night with hurting from different positions. Things with her dad and step-mom (Lei) have been pretty good--"it's always been better when I'm pregnant." Her son, Mat, still lives primarily with her dad and step-mom--he has gone overnight with Iris. Also, her step-mom recently " called Iris to ask if she wanted Mat to stay with her while they went to [place]. Lei has also been helping with getting the nursery ready, etc. Iris is getting excited about the delivery--plans to have a tub birth. She reported that she had seizures in years--maybe one since she had Mat; she had them prior to delivery and during the delivery with him (thinks from detoxing from drugs).     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 She had stopped working after pregnancy because she had to get off of her medicine. She does not plan to work after the baby is born. She has had problems with her car. Her  has been on temporary disability since June--h would be out 8-12 weeks and is currently in PT. Hoping that he will be able to go back to work after the baby is born.     She has been taking Lamictal and Seroquel--reported nosebleeds with her prenatals. She said that she had missed some days of Lamictal because of her eating habits--restarted at 100 mg (back on it for about a week).    She has not changed her name but has completed another application. She hopes to do this tomorrow.    Plan--continue Lamictal 100 mg; Seroquel 200 mg hs;  to drive her to change her name    Interval History and Content of Current Session: Iris attended her virtual visit. Katherine is 2 months old (Sept 19th). She said that her lactation consultant and pediatrician have cleared her for getting back on her Adderall--benefits outweigh potential risks. Sleep is okay--longest stretch is about 5-5.5 hours. She is planning to start working part-time with her dad soon. 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 Her anxiety has been up--feeling somewhat depressed, too.  We spent some time discussing the risks and benefits of taking Adderall while breastfeeding.  We agreed to start with 10 mg twice a day to allow her body and her baby to  adjust to the stimulant in her breast milk.  She has already increased her lamotrigine to 200 mg and has been taking that once daily.  She is back on birth control, which decreases the levels of lamotrigine in her system.  See medication plan below.    Plan--continue Lamictal 200 mg; Seroquel 200 mg hs; Adderall 10 mg bid (sent 1 script); Message me about Adderall--will need 2 scripts;  to drive her to change her name;     from January 2023.        PSYCHOTHERAPY      Site: [facility]  Time: 20 minutes  Participants: Met with patient    Therapeutic Intervention Type: behavior modifying psychotherapy, supportive psychotherapy  Why chosen therapy is appropriate versus another modality: patient responds to this modality, evidence based practice    Target symptoms: anxiety , mood swings, breastfeeding and stimulant therapy  Primary focus: see above    Outcome monitoring methods: self-report, observation    Patient's response to intervention:  The patient's response to intervention is accepting.    Progress toward goals:  The patient's progress toward goals is fair .        11/30/2023     2:54 PM 3/22/2023     8:44 AM 2/14/2023     9:36 AM   GAD7   1. Feeling nervous, anxious, or on edge? 3 1 3   2. Not being able to stop or control worrying? 2 1 3   3. Worrying too much about different things? 1 1 3   4. Trouble relaxing? 1 1 3   5. Being so restless that it is hard to sit still? 3 1 3   6. Becoming easily annoyed or irritable? 1 1 3   7. Feeling afraid as if something awful might happen? 1 1 1   NOLVIA-7 Score 12 7 19      0-4 = Minimal anxiety  5-9 = Mild anxiety  10-14 = Moderate anxiety  15-21 = Severe anxiety           8/1/2022     9:05 AM   Depression Patient Health Questionnaire   Over the last two weeks how often have you been bothered by little interest or pleasure in doing things Several days   Over the last two weeks how often have you been bothered by feeling down, depressed or hopeless  Several days   PHQ-2 Total Score 2     0-4 = No intervention  5 to 9 = Mild  10 to 14 = Moderate  15 to 19 = Moderately severe  =20 = Severe    Review of Systems   PSYCHIATRIC: Pertinant items are noted in the narrative.    Past Medical, Family and Social History: The patient's past medical, family and social history have been reviewed and updated as appropriate within the electronic medical record - see encounter notes.      Current Outpatient Medications:     dextroamphetamine-amphetamine (ADDERALL) 10 mg Tab, Take 1 tablet (10 mg total) by mouth 2 (two) times a day., Disp: 60 tablet, Rfl: 0    ibuprofen (ADVIL,MOTRIN) 600 MG tablet, Take 1 tablet (600 mg total) by mouth every 6 (six) hours as needed for Pain., Disp: 30 tablet, Rfl: 3    lamoTRIgine (LAMICTAL) 200 MG tablet, Take 1 tablet (200 mg total) by mouth every morning., Disp: 30 tablet, Rfl: 3    norethindrone (MICRONOR) 0.35 mg tablet, Take 1 tablet (0.35 mg total) by mouth once daily., Disp: 30 tablet, Rfl: 11    prenatal 114-iron a-g-folate 1 (PRENATE ELITE, IRON ASP GLYC,) 20 mg iron- 1 mg Tab, Take 1 tablet by mouth every other day., Disp: , Rfl:     PRENATAL VITAMIN 27 mg iron- 0.8 mg Tab, Take 1 tablet by mouth., Disp: , Rfl:     QUEtiapine (SEROQUEL) 200 MG Tab, Take 1 tablet (200 mg total) by mouth every evening., Disp: 30 tablet, Rfl: 3    rimegepant (NURTEC) 75 mg odt, Take 1 tablet (75 mg total) by mouth daily as needed for Migraine. Place ODT tablet on the tongue; alternatively the ODT tablet may be placed under the tongue, Disp: 8 tablet, Rfl: 11    Current Facility-Administered Medications:     onabotulinumtoxina injection 200 Units, 200 Units, Intramuscular, q12 weeks, Deb Aguirre, NP, 200 Units at 08/31/23 1516    onabotulinumtoxina injection 200 Units, 200 Units, Intramuscular, q12 weeks, Deb Aguirre, NP, 200 Units at 11/27/23 1417    Compliance: yes    Side effects: see above    Risk Parameters:  Patient reports no  "suicidal ideation  Patient reports no homicidal ideation  Patient reports no self-injurious behavior  Patient reports no violent behavior    Exam (detailed: at least 9 elements; comprehensive: all 15 elements)   Constitutional  Vitals:  Most recent vital signs were reviewed.   Last 3 sets of Vitals        9/19/2023     4:13 PM 10/24/2023     1:31 PM 11/27/2023     1:59 PM   Vitals - 1 value per visit   SYSTOLIC  126 120   DIASTOLIC  82 72   Pulse   91   Temp   97.3 °F (36.3 °C)   Resp 18  17   Weight (lb)  128.97 128.97   Weight (kg)  58.5 58.5   Height  5' (1.524 m) 5' (1.524 m)   BMI (Calculated)  25.2 25.2   Pain Score   Five          General:  age appropriate, casually dressed, neatly groomed, hair pulled back     Musculoskeletal  Muscle Strength/Tone:  no tremor, no tic   Gait & Station:  video visit     Psychiatric  Speech:  no latency; no press   Behavior: wnl   Mood & Affect:  "good"  congruent and appropriate   Thought Process:  normal and logical   Associations:  intact   Thought Content:  normal, no suicidality, no homicidality, delusions, or paranoia   Insight:  has awareness of illness   Judgement: behavior is adequate to circumstances   Orientation:  grossly intact   Memory: intact for content of interview   Language: grossly intact   Attention Span & Concentration:  decreased   Fund of Knowledge:  intact and appropriate to age and level of education     Assessment and Diagnosis   Status/Progress: Based on the examination today, the patient's problem(s) is/are adequately but not ideally controlled.  New problems have not been presented today.   Co-morbidities and psychosocial stressors  are complicating management of the primary condition.  There are no active rule-out diagnoses for this patient at this time.     General Impression:     Encounter Diagnoses   Name Primary?    Bipolar affective disorder, remission status unspecified Yes    Attention deficit hyperactivity disorder (ADHD), combined type  "    Breastfeeding (infant)        Intervention/Counseling/Treatment Plan   Medication Management: Discussed risks, benefits, and alternatives to treatment plan documented above with patient. I answered all patient questions related to this plan, and patient expressed understanding and agreement.   continue Lamictal 200 mg; Seroquel 200 mg hs; Adderall 10 mg bid (sent 1 script)  Counseling needed but restricted by time/resources   to drive her to change her name  Message me about Adderall--will need 2 scripts  Monitor baby for agitation/irritability, decreased appetite/weight loss, difficulty sleeping, etc (with taking Adderall during breastfeeding)    Return to Clinic: 2 months    Medication List with Changes/Refills   New Medications    DEXTROAMPHETAMINE-AMPHETAMINE (ADDERALL) 10 MG TAB    Take 1 tablet (10 mg total) by mouth 2 (two) times a day.   Current Medications    IBUPROFEN (ADVIL,MOTRIN) 600 MG TABLET    Take 1 tablet (600 mg total) by mouth every 6 (six) hours as needed for Pain.    NORETHINDRONE (MICRONOR) 0.35 MG TABLET    Take 1 tablet (0.35 mg total) by mouth once daily.    PRENATAL 114-IRON A-G-FOLATE 1 (PRENATE ELITE, IRON ASP GLYC,) 20 MG IRON- 1 MG TAB    Take 1 tablet by mouth every other day.    PRENATAL VITAMIN 27 MG IRON- 0.8 MG TAB    Take 1 tablet by mouth.    RIMEGEPANT (NURTEC) 75 MG ODT    Take 1 tablet (75 mg total) by mouth daily as needed for Migraine. Place ODT tablet on the tongue; alternatively the ODT tablet may be placed under the tongue   Changed and/or Refilled Medications    Modified Medication Previous Medication    LAMOTRIGINE (LAMICTAL) 200 MG TABLET lamoTRIgine (LAMICTAL) 100 MG tablet       Take 1 tablet (200 mg total) by mouth every morning.    Take 1 tablet (100 mg total) by mouth every morning.    QUETIAPINE (SEROQUEL) 200 MG TAB QUEtiapine (SEROQUEL) 200 MG Tab       Take 1 tablet (200 mg total) by mouth every evening.    Take 1 tablet (200 mg total) by mouth  every evening.   Discontinued Medications    DEXTROAMPHETAMINE-AMPHETAMINE (ADDERALL) 30 MG TAB    Take 1 tablet (30 mg total) by mouth 2 (two) times a day.    DEXTROAMPHETAMINE-AMPHETAMINE 30 MG TAB    Take 1 tablet (30 mg total) by mouth 2 (two) times daily.    DEXTROAMPHETAMINE-AMPHETAMINE 30 MG TAB    Take 1 tablet (30 mg total) by mouth 2 (two) times daily.        I spent an additional 29 minutes performing E/M services with >50% spent on counseling, guidance, coordinating care (not Psychotherapy related) in addition to the 20 minutes performing Psychotherapy.    Time spent with pt including note preparation: 49 minutes     Tiffanie Galvez, PhD, MP  Advanced Practice Medical Psychologist  [facility]  [id number] [place] [address].  TATIANA Ziegler [id number]  [phone]   [phone] fax

## 2024-01-03 ENCOUNTER — TELEPHONE (OUTPATIENT)
Dept: PSYCHIATRY | Facility: CLINIC | Age: 29
End: 2024-01-03
Payer: COMMERCIAL

## 2024-01-03 ENCOUNTER — OFFICE VISIT (OUTPATIENT)
Dept: PSYCHIATRY | Facility: CLINIC | Age: 29
End: 2024-01-03
Payer: COMMERCIAL

## 2024-01-03 DIAGNOSIS — F90.0 ATTENTION DEFICIT HYPERACTIVITY DISORDER (ADHD), PREDOMINANTLY INATTENTIVE TYPE: ICD-10-CM

## 2024-01-03 DIAGNOSIS — F31.9 BIPOLAR AFFECTIVE DISORDER, REMISSION STATUS UNSPECIFIED: Primary | ICD-10-CM

## 2024-01-03 DIAGNOSIS — F90.2 ATTENTION DEFICIT HYPERACTIVITY DISORDER (ADHD), COMBINED TYPE: ICD-10-CM

## 2024-01-03 PROCEDURE — 99214 OFFICE O/P EST MOD 30 MIN: CPT | Mod: 95,,, | Performed by: PSYCHOLOGIST

## 2024-01-03 RX ORDER — DEXTROAMPHETAMINE SACCHARATE, AMPHETAMINE ASPARTATE, DEXTROAMPHETAMINE SULFATE AND AMPHETAMINE SULFATE 2.5; 2.5; 2.5; 2.5 MG/1; MG/1; MG/1; MG/1
10 TABLET ORAL 2 TIMES DAILY
Qty: 60 TABLET | Refills: 0 | Status: SHIPPED | OUTPATIENT
Start: 2024-03-03 | End: 2024-04-02

## 2024-01-03 RX ORDER — LAMOTRIGINE 200 MG/1
200 TABLET ORAL EVERY MORNING
Qty: 30 TABLET | Refills: 5 | Status: SHIPPED | OUTPATIENT
Start: 2024-01-03 | End: 2024-07-01

## 2024-01-03 RX ORDER — QUETIAPINE FUMARATE 200 MG/1
200 TABLET, FILM COATED ORAL NIGHTLY
Qty: 30 TABLET | Refills: 5 | Status: SHIPPED | OUTPATIENT
Start: 2024-01-03 | End: 2024-07-01

## 2024-01-03 RX ORDER — DEXTROAMPHETAMINE SACCHARATE, AMPHETAMINE ASPARTATE, DEXTROAMPHETAMINE SULFATE AND AMPHETAMINE SULFATE 2.5; 2.5; 2.5; 2.5 MG/1; MG/1; MG/1; MG/1
10 TABLET ORAL 2 TIMES DAILY
Qty: 60 TABLET | Refills: 0 | Status: SHIPPED | OUTPATIENT
Start: 2024-02-02 | End: 2024-03-03

## 2024-01-03 RX ORDER — DEXTROAMPHETAMINE SACCHARATE, AMPHETAMINE ASPARTATE, DEXTROAMPHETAMINE SULFATE AND AMPHETAMINE SULFATE 2.5; 2.5; 2.5; 2.5 MG/1; MG/1; MG/1; MG/1
10 TABLET ORAL 2 TIMES DAILY
Qty: 60 TABLET | Refills: 0 | Status: SHIPPED | OUTPATIENT
Start: 2024-01-03 | End: 2024-02-02

## 2024-01-03 NOTE — PROGRESS NOTES
Outpatient Psychiatry Follow-Up Visit     1/3/2024      Timeframe: Corona Virus Outbreak     The patient location is: Patient's home/ Patient reported that his/her location at the time of this visit was in the [facility]     Visit type: Virtual visit with synchronous audio and video     Each patient to whom he or she provides medical services by telehealth is: (1) informed of the relationship between the medical psychologist and patient and the respective role of any other health care provider with respect to management of the patient; and (2) notified that he or she may decline to receive medical services by telehealth and may withdraw from such care at any time.    I also informed patient of the following:   Tiffanie Galvez, PhD, MPAP:  [place] medical license number: [id number]    My contact info:  [facility] / 2nd Floor  [address] [address]   Ph: [phone]    If technology issues, call office phone: Ph: [phone]  If crisis: Dial 911 or go to nearest Emergency Room (ER)  If questions related to privacy practices: contact [facility] Information Department: [phone]    Clinical Status of Patient:  Outpatient (Ambulatory)    Chief Complaint:  Iris Johnson is a 28 y.o. female who presents today for follow-up of mood and inattention/distractibility .      Preferred Name: Iris  Gender Identity: cis female  Preferred Pronouns: she/her    Impressions/Plan from last visit: Iris attended her virtual visit. Katherine is 2 months old ([birth year]). She said that her lactation consultant and pediatrician have cleared her for getting back on her Adderall--benefits outweigh potential risks. Sleep is okay--longest stretch is about 5-5.5 hours. She is planning to start working part-time with her dad soon. 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 Her anxiety has been up--feeling somewhat depressed, too.  We spent some time  discussing the risks and benefits of taking Adderall while breastfeeding.  We agreed to start with 10 mg twice a day to allow her body and her baby to adjust to the stimulant in her breast milk.  She has already increased her lamotrigine to 200 mg and has been taking that once daily.  She is back on birth control, which decreases the levels of lamotrigine in her system.  See medication plan below.    Plan--continue Lamictal 200 mg; Seroquel 200 mg hs; Adderall 10 mg bid (sent 1 script); Message me about Adderall--will need 2 scripts;  to drive her to change her name;    Interval History and Content of Current Session: Iris attended her virtual visit. She has restarted Adderall--does not feel like it works well for her. She said that she does not take it everyday--she forgets. She sometimes takes it 3 times a day to make it last longer--she has not seen any adverse effects in her son with breastfeeding. Mood has been level--sometimes forgets to take Lamictal. The holiday schedule threw them off because they were staying with her parents some during the holidays. She had missed two days when at their house. Sleep is good. 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 Her older son, Mat, reportedly wrote that he was thankful for his little brother for a school project. She is doing well overall--we are continuing medicines as currently prescribed.    Plan--continue Lamictal 200 mg; Seroquel 200 mg hs; Adderall 10 mg bid (sent 3 scripts);  to drive her to change her name;    Katherine (son) is 3 months old (Sept 19th)  Mat (older son) will be 10 in May--he lives with her dad and step-mom.     from November 2023.              1/3/2024     8:54 AM 11/30/2023     2:54 PM 3/22/2023     8:44 AM   GAD7   1. Feeling nervous, anxious, or on edge? 1 3 1   2. Not being able to stop or control worrying? 1 2 1   3. Worrying too much about different things? 1 1 1   4. Trouble relaxing? 1 1  1   5. Being so restless that it is hard to sit still? 1 3 1   6. Becoming easily annoyed or irritable? 1 1 1   7. Feeling afraid as if something awful might happen? 1 1 1   NOLVIA-7 Score 7 12 7      0-4 = Minimal anxiety  5-9 = Mild anxiety  10-14 = Moderate anxiety  15-21 = Severe anxiety           8/1/2022     9:05 AM   Depression Patient Health Questionnaire   Over the last two weeks how often have you been bothered by little interest or pleasure in doing things Several days   Over the last two weeks how often have you been bothered by feeling down, depressed or hopeless Several days   PHQ-2 Total Score 2     0-4 = No intervention  5 to 9 = Mild  10 to 14 = Moderate  15 to 19 = Moderately severe  =20 = Severe    Review of Systems   PSYCHIATRIC: Pertinant items are noted in the narrative.    Past Medical, Family and Social History: The patient's past medical, family and social history have been reviewed and updated as appropriate within the electronic medical record - see encounter notes.      Current Outpatient Medications:     dextroamphetamine-amphetamine (ADDERALL) 10 mg Tab, Take 1 tablet (10 mg total) by mouth 2 (two) times a day., Disp: 60 tablet, Rfl: 0    ibuprofen (ADVIL,MOTRIN) 600 MG tablet, Take 1 tablet (600 mg total) by mouth every 6 (six) hours as needed for Pain., Disp: 30 tablet, Rfl: 3    lamoTRIgine (LAMICTAL) 200 MG tablet, Take 1 tablet (200 mg total) by mouth every morning., Disp: 30 tablet, Rfl: 3    norethindrone (MICRONOR) 0.35 mg tablet, Take 1 tablet (0.35 mg total) by mouth once daily., Disp: 30 tablet, Rfl: 11    prenatal 114-iron a-g-folate 1 (PRENATE ELITE, IRON ASP GLYC,) 20 mg iron- 1 mg Tab, Take 1 tablet by mouth every other day., Disp: , Rfl:     PRENATAL VITAMIN 27 mg iron- 0.8 mg Tab, Take 1 tablet by mouth., Disp: , Rfl:     QUEtiapine (SEROQUEL) 200 MG Tab, Take 1 tablet (200 mg total) by mouth every evening., Disp: 30 tablet, Rfl: 3    rimegepant (NURTEC) 75 mg odt, Take 1  "tablet (75 mg total) by mouth daily as needed for Migraine. Place ODT tablet on the tongue; alternatively the ODT tablet may be placed under the tongue, Disp: 8 tablet, Rfl: 11    Current Facility-Administered Medications:     onabotulinumtoxina injection 200 Units, 200 Units, Intramuscular, q12 weeks, Deb Aguirre, NP, 200 Units at 08/31/23 1516    onabotulinumtoxina injection 200 Units, 200 Units, Intramuscular, q12 weeks, Deb Aguirre, NP, 200 Units at 11/27/23 1417    Compliance: yes    Side effects: see above    Risk Parameters:  Patient reports no suicidal ideation  Patient reports no homicidal ideation  Patient reports no self-injurious behavior  Patient reports no violent behavior    Exam (detailed: at least 9 elements; comprehensive: all 15 elements)   Constitutional  Vitals:  Most recent vital signs were reviewed.   Last 3 sets of Vitals        9/19/2023     4:13 PM 10/24/2023     1:31 PM 11/27/2023     1:59 PM   Vitals - 1 value per visit   SYSTOLIC  126 120   DIASTOLIC  82 72   Pulse   91   Temp   97.3 °F (36.3 °C)   Resp 18  17   Weight (lb)  128.97 128.97   Weight (kg)  58.5 58.5   Height  5' (1.524 m) 5' (1.524 m)   BMI (Calculated)  25.2 25.2   Pain Score   Five          General:  age appropriate, casually dressed, neatly groomed, hair pulled back     Musculoskeletal  Muscle Strength/Tone:  no tremor, no tic   Gait & Station:  video visit     Psychiatric  Speech:  no latency; no press   Behavior: wnl   Mood & Affect:  "good"  congruent and appropriate   Thought Process:  normal and logical   Associations:  intact   Thought Content:  normal, no suicidality, no homicidality, delusions, or paranoia   Insight:  has awareness of illness   Judgement: behavior is adequate to circumstances   Orientation:  grossly intact   Memory: intact for content of interview   Language: grossly intact   Attention Span & Concentration:  Grossly intact   Fund of Knowledge:  intact and appropriate to age and " level of education     Assessment and Diagnosis   Status/Progress: Based on the examination today, the patient's problem(s) is/are adequately but not ideally controlled.  New problems have not been presented today.   Co-morbidities and psychosocial stressors  are complicating management of the primary condition.  There are no active rule-out diagnoses for this patient at this time.     General Impression:     Encounter Diagnoses   Name Primary?    Attention deficit hyperactivity disorder (ADHD), predominantly inattentive type Yes    Bipolar I disorder in remission        Intervention/Counseling/Treatment Plan   Medication Management: Discussed risks, benefits, and alternatives to treatment plan documented above with patient. I answered all patient questions related to this plan, and patient expressed understanding and agreement.   continue Lamictal 200 mg; Seroquel 200 mg hs; Adderall 10 mg bid (sent 3 scripts)  Counseling needed but restricted by time/resources   to drive her to change her name    Return to Clinic: 3 months    Medication List with Changes/Refills   Current Medications    DEXTROAMPHETAMINE-AMPHETAMINE (ADDERALL) 10 MG TAB    Take 1 tablet (10 mg total) by mouth 2 (two) times a day.    IBUPROFEN (ADVIL,MOTRIN) 600 MG TABLET    Take 1 tablet (600 mg total) by mouth every 6 (six) hours as needed for Pain.    LAMOTRIGINE (LAMICTAL) 200 MG TABLET    Take 1 tablet (200 mg total) by mouth every morning.    NORETHINDRONE (MICRONOR) 0.35 MG TABLET    Take 1 tablet (0.35 mg total) by mouth once daily.    PRENATAL 114-IRON A-G-FOLATE 1 (PRENATE ELITE, IRON ASP GLYC,) 20 MG IRON- 1 MG TAB    Take 1 tablet by mouth every other day.    PRENATAL VITAMIN 27 MG IRON- 0.8 MG TAB    Take 1 tablet by mouth.    QUETIAPINE (SEROQUEL) 200 MG TAB    Take 1 tablet (200 mg total) by mouth every evening.    RIMEGEPANT (NURTEC) 75 MG ODT    Take 1 tablet (75 mg total) by mouth daily as needed for Migraine. Place ODT  tablet on the tongue; alternatively the ODT tablet may be placed under the tongue        Time spent with pt including note preparation: 22 minutes     Tiffanie Galvez, PhD, MP  Advanced Practice Medical Psychologist  [facility]  [facility] [place] [address]  [place] [id number] [phone]   [phone] fax

## 2024-01-03 NOTE — PATIENT INSTRUCTIONS

## 2024-01-03 NOTE — TELEPHONE ENCOUNTER
----- Message from Tiffanie Galvez, PhD, MPAP sent at 1/3/2024  8:40 AM CST -----  Regarding: virtual?  There is a note in the system for virtual today but I can't tell if it's new or old. Can someone check? THanks

## 2024-02-05 NOTE — NURSING
Assisted pt to bathroom. Pt voided for 300 mL. Senia care instructions given. Pt verbalized and demonstrated understanding.    Outreach attempt was made to schedule a Medicare Wellness Visit. This was the first attempt. Contact was not made, left message.

## 2024-02-19 ENCOUNTER — PROCEDURE VISIT (OUTPATIENT)
Dept: NEUROLOGY | Facility: CLINIC | Age: 29
End: 2024-02-19
Payer: COMMERCIAL

## 2024-02-19 VITALS
HEART RATE: 82 BPM | HEIGHT: 60 IN | WEIGHT: 128.88 LBS | SYSTOLIC BLOOD PRESSURE: 100 MMHG | TEMPERATURE: 98 F | BODY MASS INDEX: 25.3 KG/M2 | RESPIRATION RATE: 17 BRPM | DIASTOLIC BLOOD PRESSURE: 64 MMHG

## 2024-02-19 DIAGNOSIS — G43.719 INTRACTABLE CHRONIC MIGRAINE WITHOUT AURA AND WITHOUT STATUS MIGRAINOSUS: Primary | ICD-10-CM

## 2024-02-19 PROCEDURE — 64615 CHEMODENERV MUSC MIGRAINE: CPT | Mod: S$GLB,,, | Performed by: NURSE PRACTITIONER

## 2024-02-19 NOTE — PROCEDURES
Procedures     BOTOX PROCEDURE    PROCEDURE PERFORMED: Botulinum toxin injection (22626)    CLINICAL INDICATION: G43.719    Cycle #9    A time out was conducted just before the start of the procedure to verify the correct patient and procedure, procedure location, and all relevant critical information.   Signed consent obtained prior to procedure     Conventional methods of treatment but the patient has been unresponsive and refractory.The patient meets criteria for chronic headaches according to the ICHD-III, the patient has more than 15 headaches a month at least 8 of them meet migraine criteria, which last for more than 4 hours a day.     Last Botox injections were 12 weeks ago and there has been over 50%  improvement in the patient's symptoms. Frequency of treatment is every 12 weeks unless no response to the treatments, at which time we will discontinue the injections.     DESCRIPTION OF PROCEDURE: After obtaining informed consent and under  aseptic technique, a total of 155 units of botulinum toxin type A were   injected in the following muscles: Procerus 5 units,  5 units  bilaterally, frontalis 20 units, temporalis 20 units bilaterally,  occipitalis 15 units, upper cervical paraspinals 10 units bilaterally and trapezius 15 units bilaterally. The patient was given a total of 155 units in 31 sites.    The patient tolerated the procedure well. There were no complications. The patient was given a prescription for repeat treatment in 12 weeks.     Unavoidable waste 45 units    RTC in 12 weeks.     SERA Goodman

## 2024-03-13 ENCOUNTER — PATIENT MESSAGE (OUTPATIENT)
Dept: PSYCHIATRY | Facility: CLINIC | Age: 29
End: 2024-03-13
Payer: COMMERCIAL

## 2024-04-11 ENCOUNTER — TELEPHONE (OUTPATIENT)
Dept: PSYCHIATRY | Facility: CLINIC | Age: 29
End: 2024-04-11
Payer: COMMERCIAL

## 2024-04-15 ENCOUNTER — OFFICE VISIT (OUTPATIENT)
Dept: PSYCHIATRY | Facility: CLINIC | Age: 29
End: 2024-04-15
Payer: COMMERCIAL

## 2024-04-15 DIAGNOSIS — F31.30 BIPOLAR I DISORDER, MOST RECENT EPISODE (OR CURRENT) DEPRESSED: Primary | ICD-10-CM

## 2024-04-15 DIAGNOSIS — F90.0 ATTENTION DEFICIT HYPERACTIVITY DISORDER (ADHD), PREDOMINANTLY INATTENTIVE TYPE: ICD-10-CM

## 2024-04-15 PROCEDURE — 99214 OFFICE O/P EST MOD 30 MIN: CPT | Mod: 95,,, | Performed by: PSYCHOLOGIST

## 2024-04-15 RX ORDER — DEXTROAMPHETAMINE SACCHARATE, AMPHETAMINE ASPARTATE, DEXTROAMPHETAMINE SULFATE AND AMPHETAMINE SULFATE 5; 5; 5; 5 MG/1; MG/1; MG/1; MG/1
1 TABLET ORAL 2 TIMES DAILY
Qty: 60 TABLET | Refills: 0 | Status: SHIPPED | OUTPATIENT
Start: 2024-04-15 | End: 2024-05-15

## 2024-04-15 RX ORDER — DEXTROAMPHETAMINE SACCHARATE, AMPHETAMINE ASPARTATE, DEXTROAMPHETAMINE SULFATE AND AMPHETAMINE SULFATE 5; 5; 5; 5 MG/1; MG/1; MG/1; MG/1
1 TABLET ORAL 2 TIMES DAILY
Qty: 60 TABLET | Refills: 0 | Status: SHIPPED | OUTPATIENT
Start: 2024-05-15 | End: 2024-06-14

## 2024-04-15 RX ORDER — LAMOTRIGINE 200 MG/1
200 TABLET ORAL EVERY MORNING
Qty: 30 TABLET | Refills: 5 | Status: SHIPPED | OUTPATIENT
Start: 2024-04-15 | End: 2024-10-12

## 2024-04-15 RX ORDER — DEXTROAMPHETAMINE SACCHARATE, AMPHETAMINE ASPARTATE, DEXTROAMPHETAMINE SULFATE AND AMPHETAMINE SULFATE 5; 5; 5; 5 MG/1; MG/1; MG/1; MG/1
1 TABLET ORAL 2 TIMES DAILY
Qty: 60 TABLET | Refills: 0 | Status: SHIPPED | OUTPATIENT
Start: 2024-06-14 | End: 2024-07-14

## 2024-04-15 RX ORDER — QUETIAPINE FUMARATE 300 MG/1
300 TABLET, FILM COATED ORAL NIGHTLY
Qty: 30 TABLET | Refills: 5 | Status: SHIPPED | OUTPATIENT
Start: 2024-04-15 | End: 2024-10-12

## 2024-04-15 NOTE — PATIENT INSTRUCTIONS

## 2024-04-15 NOTE — PROGRESS NOTES
Outpatient Psychiatry Follow-Up Visit     4/15/2024      Timeframe: Corona Virus Outbreak     The patient location is: Patient's home/ Patient reported that his/her location at the time of this visit was in the [facility]     Visit type: Virtual visit with synchronous audio and video     Each patient to whom he or she provides medical services by telehealth is: (1) informed of the relationship between the medical psychologist and patient and the respective role of any other health care provider with respect to management of the patient; and (2) notified that he or she may decline to receive medical services by telehealth and may withdraw from such care at any time.    I also informed patient of the following:   Tiffanie Galvez, PhD, MPAP:  [place] medical license number: [id number]    My contact info:  [facility] / 2nd Floor  [address] [place] [address]   Ph: [phone]    If technology issues, call office phone: Ph: [phone]  If crisis: Dial 911 or go to nearest Emergency Room (ER)  If questions related to privacy practices: contact [facility] Information Department: [phone]    Clinical Status of Patient:  Outpatient (Ambulatory)    Chief Complaint:  Iris Johnson is a 28 y.o. female who presents today for follow-up of mood and inattention/distractibility .      Preferred Name: Iris  Gender Identity: cis female  Preferred Pronouns: she/her    Impressions/Plan from last visit: Iris attended her virtual visit. She has restarted Adderall--does not feel like it works well for her. She said that she does not take it everyday--she forgets. She sometimes takes it 3 times a day to make it last longer--she has not seen any adverse effects in her son with breastfeeding. Mood has been level--sometimes forgets to take Lamictal. The holiday schedule threw them off because they were staying with her parents some during the holidays. She had missed two  days when at their house. Sleep is good. 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 Her older son, Mat, reportedly wrote that he was thankful for his little brother for a school project. She is doing well overall--we are continuing medicines as currently prescribed.    Plan--continue Lamictal 200 mg; Seroquel 200 mg hs; Adderall 10 mg bid (sent 3 scripts);  to drive her to change her name;    Interval History and Content of Current Session: Iris attended her virtual visit. She said that she is doing okay--she is no longer nursing, and that's been emotional for her. Her supply dropped, and baby was losing weight. She has been done for about a week since he has latched. She is emotional about it--tearful. She wants to increase her Seroquel and Adderall. She said that she is not sleeping well at night. Her  mostly does the night shift for their son to give her a chance to sleep. They still have not changed her name. We agreed to increase her Seroquel and Adderall this round. Her other son, Mat, is doing well.     Plan--continue Lamictal 200 mg; increase Seroquel 300 mg hs; increase Adderall 20 mg bid (sent 3 scripts);  to drive her to change her name;    Katherine (son) is 3 months old (Sept 19th)  Mat (older son) will be 10 in May--he lives with her dad and step-mom.     from January 2024.              4/15/2024     9:54 AM 1/3/2024     8:54 AM 11/30/2023     2:54 PM   GAD7   1. Feeling nervous, anxious, or on edge? 1 1 3   2. Not being able to stop or control worrying? 1 1 2   3. Worrying too much about different things? 1 1 1   4. Trouble relaxing? 1 1 1   5. Being so restless that it is hard to sit still? 1 1 3   6. Becoming easily annoyed or irritable? 1 1 1   7. Feeling afraid as if something awful might happen? 1 1 1   NOLVIA-7 Score 7 7 12      0-4 = Minimal anxiety  5-9 = Mild anxiety  10-14 = Moderate anxiety  15-21 = Severe anxiety           8/1/2022      9:05 AM   Depression Patient Health Questionnaire   Over the last two weeks how often have you been bothered by little interest or pleasure in doing things Several days   Over the last two weeks how often have you been bothered by feeling down, depressed or hopeless Several days   PHQ-2 Total Score 2     0-4 = No intervention  5 to 9 = Mild  10 to 14 = Moderate  15 to 19 = Moderately severe  =20 = Severe    Review of Systems   PSYCHIATRIC: Pertinant items are noted in the narrative.    Past Medical, Family and Social History: The patient's past medical, family and social history have been reviewed and updated as appropriate within the electronic medical record - see encounter notes.      Current Outpatient Medications:     dextroamphetamine-amphetamine (ADDERALL) 20 mg tablet, Take 1 tablet by mouth 2 (two) times a day., Disp: 60 tablet, Rfl: 0    [START ON 5/15/2024] dextroamphetamine-amphetamine (ADDERALL) 20 mg tablet, Take 1 tablet by mouth 2 (two) times a day., Disp: 60 tablet, Rfl: 0    [START ON 6/14/2024] dextroamphetamine-amphetamine (ADDERALL) 20 mg tablet, Take 1 tablet by mouth 2 (two) times a day., Disp: 60 tablet, Rfl: 0    lamoTRIgine (LAMICTAL) 200 MG tablet, Take 1 tablet (200 mg total) by mouth every morning., Disp: 30 tablet, Rfl: 5    norethindrone (MICRONOR) 0.35 mg tablet, Take 1 tablet (0.35 mg total) by mouth once daily., Disp: 30 tablet, Rfl: 11    QUEtiapine (SEROQUEL) 300 MG Tab, Take 1 tablet (300 mg total) by mouth every evening., Disp: 30 tablet, Rfl: 5    rimegepant (NURTEC) 75 mg odt, Take 1 tablet (75 mg total) by mouth daily as needed for Migraine. Place ODT tablet on the tongue; alternatively the ODT tablet may be placed under the tongue, Disp: 8 tablet, Rfl: 11    Current Facility-Administered Medications:     onabotulinumtoxina injection 200 Units, 200 Units, Intramuscular, q12 weeks, Deb Aguirre, NP, 200 Units at 08/31/23 1516    onabotulinumtoxina injection 200 Units,  200 Units, Intramuscular, q12 weeks, Deb Aguirre, NP, 200 Units at 02/19/24 1518    Compliance: yes    Side effects: see above    Risk Parameters:  Patient reports no suicidal ideation  Patient reports no homicidal ideation  Patient reports no self-injurious behavior  Patient reports no violent behavior    Exam (detailed: at least 9 elements; comprehensive: all 15 elements)   Constitutional  Vitals:  Most recent vital signs were reviewed.   Last 3 sets of Vitals        10/24/2023     1:31 PM 11/27/2023     1:59 PM 2/19/2024     3:04 PM   Vitals - 1 value per visit   SYSTOLIC 126 120 100   DIASTOLIC 82 72 64   Pulse  91 82   Temp  97.3 °F (36.3 °C) 97.6 °F (36.4 °C)   Resp  17 17   Weight (lb) 128.97 128.97 128.86   Weight (kg) 58.5 58.5 58.45   Height 5' (1.524 m) 5' (1.524 m) 5' (1.524 m)   BMI (Calculated) 25.2 25.2 25.2   Pain Score  Five Four          General:  age appropriate, casually dressed, neatly groomed, hair pulled back     Musculoskeletal  Muscle Strength/Tone:  no tremor, no tic   Gait & Station:  video visit     Psychiatric  Speech:  no latency; no press   Behavior: wnl   Mood & Affect:  okay  congruent and appropriate   Thought Process:  normal and logical   Associations:  intact   Thought Content:  normal, no suicidality, no homicidality, delusions, or paranoia   Insight:  has awareness of illness   Judgement: behavior is adequate to circumstances   Orientation:  grossly intact   Memory: intact for content of interview   Language: grossly intact   Attention Span & Concentration:  Grossly intact   Fund of Knowledge:  intact and appropriate to age and level of education     Assessment and Diagnosis   Status/Progress: Based on the examination today, the patient's problem(s) is/are adequately but not ideally controlled.  New problems have not been presented today.   Co-morbidities and psychosocial stressors  are complicating management of the primary condition.  There are no active rule-out  diagnoses for this patient at this time.     General Impression:     Encounter Diagnoses   Name Primary?    Bipolar I disorder, most recent episode (or current) depressed Yes    Attention deficit hyperactivity disorder (ADHD), predominantly inattentive type        Intervention/Counseling/Treatment Plan   Medication Management: Discussed risks, benefits, and alternatives to treatment plan documented above with patient. I answered all patient questions related to this plan, and patient expressed understanding and agreement.   continue Lamictal 200 mg; increase Seroquel 300 mg hs; increase Adderall 20 mg bid (sent 3 scripts)  Counseling needed but restricted by time/resources   to drive her to change her name    Return to Clinic: 3 months    Medication List with Changes/Refills   New Medications    DEXTROAMPHETAMINE-AMPHETAMINE (ADDERALL) 20 MG TABLET    Take 1 tablet by mouth 2 (two) times a day.    DEXTROAMPHETAMINE-AMPHETAMINE (ADDERALL) 20 MG TABLET    Take 1 tablet by mouth 2 (two) times a day.    DEXTROAMPHETAMINE-AMPHETAMINE (ADDERALL) 20 MG TABLET    Take 1 tablet by mouth 2 (two) times a day.   Current Medications    NORETHINDRONE (MICRONOR) 0.35 MG TABLET    Take 1 tablet (0.35 mg total) by mouth once daily.    RIMEGEPANT (NURTEC) 75 MG ODT    Take 1 tablet (75 mg total) by mouth daily as needed for Migraine. Place ODT tablet on the tongue; alternatively the ODT tablet may be placed under the tongue   Changed and/or Refilled Medications    Modified Medication Previous Medication    LAMOTRIGINE (LAMICTAL) 200 MG TABLET lamoTRIgine (LAMICTAL) 200 MG tablet       Take 1 tablet (200 mg total) by mouth every morning.    Take 1 tablet (200 mg total) by mouth every morning.    QUETIAPINE (SEROQUEL) 300 MG TAB QUEtiapine (SEROQUEL) 200 MG Tab       Take 1 tablet (300 mg total) by mouth every evening.    Take 1 tablet (200 mg total) by mouth every evening.   Discontinued Medications     DEXTROAMPHETAMINE-AMPHETAMINE (ADDERALL) 10 MG TAB    Take 1 tablet (10 mg total) by mouth 2 (two) times a day.    DEXTROAMPHETAMINE-AMPHETAMINE (ADDERALL) 10 MG TAB    Take 1 tablet (10 mg total) by mouth 2 (two) times a day.    DEXTROAMPHETAMINE-AMPHETAMINE (ADDERALL) 10 MG TAB    Take 1 tablet (10 mg total) by mouth 2 (two) times a day.    PRENATAL 114-IRON A-G-FOLATE 1 (PRENATE ELITE, IRON ASP GLYC,) 20 MG IRON- 1 MG TAB    Take 1 tablet by mouth every other day.    PRENATAL VITAMIN 27 MG IRON- 0.8 MG TAB    Take 1 tablet by mouth.        Time spent with pt including note preparation: 22 minutes     Tiffanie Galvez, PhD, MP  Advanced Practice Medical Psychologist  [facility]  [address] [place] [address] [facility].  [place] [id number]  [phone]   [phone] fax

## 2024-05-13 ENCOUNTER — PROCEDURE VISIT (OUTPATIENT)
Dept: NEUROLOGY | Facility: CLINIC | Age: 29
End: 2024-05-13
Payer: COMMERCIAL

## 2024-05-13 VITALS
RESPIRATION RATE: 17 BRPM | HEIGHT: 60 IN | WEIGHT: 128.75 LBS | TEMPERATURE: 97 F | BODY MASS INDEX: 25.28 KG/M2 | SYSTOLIC BLOOD PRESSURE: 116 MMHG | HEART RATE: 81 BPM | DIASTOLIC BLOOD PRESSURE: 81 MMHG

## 2024-05-13 DIAGNOSIS — G43.719 INTRACTABLE CHRONIC MIGRAINE WITHOUT AURA AND WITHOUT STATUS MIGRAINOSUS: Primary | ICD-10-CM

## 2024-05-13 PROCEDURE — 64615 CHEMODENERV MUSC MIGRAINE: CPT | Mod: S$GLB,,, | Performed by: NURSE PRACTITIONER

## 2024-05-13 NOTE — PROCEDURES
Procedures     BOTOX PROCEDURE    PROCEDURE PERFORMED: Botulinum toxin injection (92319)    CLINICAL INDICATION: G43.719    Cycle #10    A time out was conducted just before the start of the procedure to verify the correct patient and procedure, procedure location, and all relevant critical information.   Signed consent obtained prior to procedure     Conventional methods of treatment but the patient has been unresponsive and refractory.The patient meets criteria for chronic headaches according to the ICHD-III, the patient has more than 15 headaches a month at least 8 of them meet migraine criteria, which last for more than 4 hours a day.     Last Botox injections were 12 weeks ago and there has been over 50%  improvement in the patient's symptoms. Frequency of treatment is every 12 weeks unless no response to the treatments, at which time we will discontinue the injections.     DESCRIPTION OF PROCEDURE: After obtaining informed consent and under  aseptic technique, a total of 155 units of botulinum toxin type A were   injected in the following muscles: Procerus 5 units,  5 units  bilaterally, frontalis 20 units, temporalis 20 units bilaterally,  occipitalis 15 units, upper cervical paraspinals 10 units bilaterally and trapezius 15 units bilaterally. The patient was given a total of 155 units in 31 sites.    The patient tolerated the procedure well. There were no complications. The patient was given a prescription for repeat treatment in 12 weeks.     Unavoidable waste 45 units    RTC in 12 weeks.     SERA Goodman

## 2024-05-27 ENCOUNTER — OFFICE VISIT (OUTPATIENT)
Dept: NEUROLOGY | Facility: CLINIC | Age: 29
End: 2024-05-27
Payer: COMMERCIAL

## 2024-05-27 ENCOUNTER — PATIENT MESSAGE (OUTPATIENT)
Dept: PSYCHIATRY | Facility: CLINIC | Age: 29
End: 2024-05-27
Payer: COMMERCIAL

## 2024-05-27 DIAGNOSIS — G43.719 INTRACTABLE CHRONIC MIGRAINE WITHOUT AURA AND WITHOUT STATUS MIGRAINOSUS: Primary | ICD-10-CM

## 2024-05-27 PROCEDURE — 99213 OFFICE O/P EST LOW 20 MIN: CPT | Mod: 95,,, | Performed by: NURSE PRACTITIONER

## 2024-05-27 RX ORDER — ELETRIPTAN HYDROBROMIDE 40 MG/1
TABLET, FILM COATED ORAL
Qty: 10 TABLET | Refills: 11 | Status: SHIPPED | OUTPATIENT
Start: 2024-05-27

## 2024-05-27 RX ORDER — FREMANEZUMAB-VFRM 225 MG/1.5ML
225 INJECTION SUBCUTANEOUS
Qty: 1 EACH | Refills: 11 | Status: SHIPPED | OUTPATIENT
Start: 2024-05-27

## 2024-05-27 NOTE — PROGRESS NOTES
[facility]    Chief complaint: chronic migraine     S:    29 y.o. ambidextrous F with PMHx of ADHD, iron deficiency anemia, bipolar 1 disorder, OCD, anxiety, depression, insomnia who presents for further follow up of headache.     INTERVAL HISTORY 5/27/24  The patient location is: home  The chief complaint leading to consultation is: follow up  Visit type: audiovisual  Face to Face time with patient: 10  20 minutes of total time spent on the encounter, which includes face to face time and non-face to face time preparing to see the patient (eg, review of tests), Obtaining and/or reviewing separately obtained history, Documenting clinical information in the electronic or other health record, Independently interpreting results (not separately reported) and communicating results to the patient/family/caregiver, or Care coordination (not separately reported).   Each patient to whom he or she provides medical services by telemedicine is:  (1) informed of the relationship between the physician and patient and the respective role of any other health care provider with respect to management of the patient; and (2) notified that he or she may decline to receive medical services by telemedicine and may withdraw from such care at any time.    Notes:     Last office visit was about 18 months ago and most recent Botox was two weeks ago.    Today she reports she is doing well. She has about 10-15 mild headaches per month with 5-6 escalation to migraine per month. Current head pain 0 with range 0-10. She takes Nurtec or eletriptan both are effective. Otherwise information below is reviewed and verified with no changes made    INTERVAL HISTORY 10/25/22  The patient location is: home  The chief complaint leading to consultation is: follow up  Visit type: audiovisual  Face to Face time with patient: 15  20 minutes of total time spent on the encounter, which includes face to face time and non-face  to face time preparing to see the patient (eg, review of tests), Obtaining and/or reviewing separately obtained history, Documenting clinical information in the electronic or other health record, Independently interpreting results (not separately reported) and communicating results to the patient/family/caregiver, or Care coordination (not separately reported).   Each patient to whom he or she provides medical services by telemedicine is:  (1) informed of the relationship between the physician and patient and the respective role of any other health care provider with respect to management of the patient; and (2) notified that he or she may decline to receive medical services by telemedicine and may withdraw from such care at any time.    Notes:     Previously She had completed 2 cycles of Botox. She mentions that after the Botox cycle 3 on 6/20/22 she had a migraine x 12 days. She treated with nurtec, eletriptan and sumatriptan SC with limited success. nurtec helped some, but eletriptan did not help at all. After those 12 days she is much better. She has had 2 migraine attacks last month and they were 2-4 days long. She feels that her acute medications work in the follow order from best to worse: sumatriptan sc (makes her have worsening pain, dizziness for about 30 minutes so has to lay down, but improves the intensity, is functional after a few hours, but migraine does not necessarily resolve) > Nurtec (if takes it and goes to bed typically wakes up attack free, but has to take it early >>eletriptan 40mg (has only a very small window to treat very early in process, if waits too long then will not be effect. Overall she is over 50% improved, but still battling the longer migraine attacks. She is open to other options for acute manageent. She does find Botox works well. The first Btx caused 10 day of migraine attack. The second did not. She is not sure if it was because she presented with severe migraine already that  the headache occurred for so long. She has been having more neck pain and feels that might be triggering some of the headache as well. Typically will have neck pain along with the headache. She would like to continue Botox for prevention.    Today she is six weeks s/p third Botox and reports much better. She reports fewer headaches. In the past 2 months, she has had 3 migraines. Current pain 0 with range 0-9. She takes Nurtec, eletriptan, and sumatriptan IM depending on severity. She has tried DHE NS with success too. She is having some dental issues which is causing some facial pain and headaches. She is planning to conceive when possible. She is on Seroquel and Lamictal. She has plans to stay on Seroquel but will start tapering off Lamictal. She wishes to stay on Botox when/if pregnant. Otherwise information below is reviewed and verified with no changes made    Headache history from initial evaluation on 11/30/21:  Age at onset and course over time: she had them since elementary school, she reports that she has been having over the years more days of headache since childhood. She mentions that since getting dark glasses in 2017 they improved some. She mentions that the only time she did not have migraine was during the pregnancy. She mentions that she even had Daith Piercing which did not work. She mentions that she could before push through day #4 of migraine, but she is mentions that she is having more disabling attacks.    Location: entire head   Character: stabbing, pressure, throbbing/pounding, sharp   Intensity:  3-10/10  Has about 10 days of no headache   Frequency: 20/30 days   She gets a sick feeling like she knows an attack will hit her. She mentions that if she does not wake up with one in the morning, by 2 pm it just hit her and just starts at once and all the symptoms start at once. She mentions that when she leaves work and has things to do and she will have worsening when she is doing things. She  mentions that she has not been to work in over 2 weeks now . She mentions that she would leave at lunch time to go home and has not been back.   Duration: minimum 1 week of migraine , when it's severe she feels unsafe.   She is waking up with them, but if does not wake up with them by 2 pm she will have sudden onset of the attack.   She will use APAP/NSAID and will use   Aura: none   Associated symptoms: photophobia, phonophobia, osmophobia, kinesiophobia, neck tightness/pain, nausea/vomiting  Other neurologic symptoms: dizziness, vertigo, ringing in the ears, mood changes, problems with concentration, memory, task completion, relaxation, neck tightness, neck pain.   Precipitating factors: light, noise  Alleviating factors: darkness, heat, ice, massage, local pressure  Aggravating factors: bright lights, loud noises  High pitched steady ringing in right ear  Family history of headache: paternal cousins, paternal grandmother has headache all the time  ER/UC visits:   Caffeine: 4 cups of coffee  Sleep: trouble falling asleep, trouble staying asleep, un refreshing sleep     Medication history:  Acute:  apap  nsaid- ibuprofen, asa, naproxen  fioricet  excedrin  Hydrocodone   Sumatriptan 20mg NS - does not like this  Sumatriptan 6mg sc - works well at pain relief, but then 30 minutes of feeling terrible after it so she holds off on using.   Eletriptan 40mg - can help , but very small window very early in migraine, otherwise did not work.   Nurtec ODT 75mg - helps some earlier in [facility].     Preventive:  Lamotrigine 200 mg for mood stabilizer.   seroquel 300 mg nightly   pregabalin  topiramate   Desipramine   Fluoxetine   Bupropion   Citalopram  escitalopram  Gabapentin   Diazepam  Alprazolam   Lorazepam   clonazepam  Botox 155 units started on 12/21- present: over 50% improvement     Has never had CGRP Mab     Neuroimaging and other studies:   She has had MRIs int he past and has been told normal.   She has metal in her  tongue that is stuck and is trying to find someone to surgically remove it.       No changes to past medical history, surgical or family history since last appointment.   Social history:  Occupation: construction, manual labor, operating equipment.   Currently on CDL      7.5y son  No plans of further pregnancies.   Social History     Tobacco Use    Smoking status: Former     Types: Vaping with nicotine    Smokeless tobacco: Never   Substance Use Topics    Alcohol use: Not Currently     Comment: 1-2 drinks lper week    Drug use: Not Currently     Types: Amphetamines, Marijuana     Comment: narcotics, ecstacy (all prior use)     Review of patient's allergies indicates:   Allergen Reactions    Bactrim [sulfamethoxazole-trimethoprim]     Sulfa (sulfonamide antibiotics)        Current Outpatient Medications:     dextroamphetamine-amphetamine (ADDERALL) 20 mg tablet, Take 1 tablet by mouth 2 (two) times a day., Disp: 60 tablet, Rfl: 0    [START ON 6/14/2024] dextroamphetamine-amphetamine (ADDERALL) 20 mg tablet, Take 1 tablet by mouth 2 (two) times a day., Disp: 60 tablet, Rfl: 0    eletriptan (RELPAX) 40 MG tablet, 1 tab PO PRN migraine. May repeat once in 2 hours. Use no more than 10 days per month., Disp: 10 tablet, Rfl: 11    fremanezumab-vfrm (AJOVY AUTOINJECTOR) 225 mg/1.5 mL autoinjector, Inject 1.5 mLs (225 mg total) into the skin every 28 days., Disp: 1 each, Rfl: 11    lamoTRIgine (LAMICTAL) 200 MG tablet, Take 1 tablet (200 mg total) by mouth every morning., Disp: 30 tablet, Rfl: 5    norethindrone (MICRONOR) 0.35 mg tablet, Take 1 tablet (0.35 mg total) by mouth once daily., Disp: 30 tablet, Rfl: 11    QUEtiapine (SEROQUEL) 300 MG Tab, Take 1 tablet (300 mg total) by mouth every evening., Disp: 30 tablet, Rfl: 5    rimegepant (NURTEC) 75 mg odt, Take 1 tablet (75 mg total) by mouth daily as needed for Migraine. Place ODT tablet on the tongue; alternatively the ODT tablet may be placed under the tongue,  Disp: 8 tablet, Rfl: 11    Current Facility-Administered Medications:     onabotulinumtoxina injection 200 Units, 200 Units, Intramuscular, q12 weeks, Deb Aguirre, NP, 200 Units at 08/31/23 1516    onabotulinumtoxina injection 200 Units, 200 Units, Intramuscular, q12 weeks, Deb Aguirre, NP, 200 Units at 05/13/24 1425    PHYSICAL EXAMINATION  There were no vitals filed for this visit.   Constitutional:   She appears well-developed and well-nourished. She is well groomed     Neurological Exam:  General: well-developed, well-nourished, no distress  Mental status: Awake and alert  Speech language: No dysarthria or aphasia on conversation  Cranial nerves: Face symmetric        Impression:  Intractable Chronic migraine without aura - she has had lifelong migraine disease which has been chronic for many years without much improvement on oral preventives. She has been overusing OTC medications to get through the day and manage. She has been on OCPs for 7.5 years now without any worsening headache. Initially she was having 20/30 days of severe migraine with significant disability as per MIDAS score and history. Since third Botox for chornic migraine she has had in the last 6 weeks 1- migraine attacks, they are lasting less to 2 days and feels sumatriptan is working, but does not like the sumatriptan effect. We will transition her to 4mg sc. Also will provide Eletriptan 40mg for acute management of milder attacks and Nurtec to see if adding that will also provide benefit without return of migraine. She is no longer pregnant and on birth control.     Medication overuse hadache - resolved.     Comorbidities:  ADHD -> Adderall    iron deficiency anemia    bipolar 1 disorder - sees psychiatry currently on Lamotrigine 200mg and Seroquel 300mg    OCD, anxiety, depression, insomnia -> behavioral health     Plan:   1- For preventive management: a. Continue Botox q 12weeks  Muscles to be injected:  total of 155 units  of botulinum toxin type A were  injected in the following muscles: Procerus 5 units,  5 units bilaterally, frontalis 20 units, temporalis 20 units bilaterally, occipitalis 15 units, upper cervical paraspinals 10 units bilaterally and trapezius 15 units bilaterally. Total of 155 units in 31 sites.          B. Toradol 60MG IM for day of Botox          C. Add ajovy.    2- Acute management:   For mild attacks catching early  eletriptan 40 mg at onset, can repeat after 2 hours. Max 2/day and/or  Nurtec 75mg . Max 1/day  For sudden onset migraine, severe attack, nausea/vomiting:      A. Sumatriptan 6 mg sc, can repeat after 2 hours. Max 3/day.   OR      You can mix with Nurtec ODT 75mg with trudhesa or the triptans.     For nausea: Zofran ODT 4 mg every 8 hours as needed     3- Mini prophylaxis  For migraine around Nurtec day prior to Botox x 3-5 days.     RTC for next Botox.         SERA Goodman

## 2024-07-01 ENCOUNTER — TELEPHONE (OUTPATIENT)
Dept: PSYCHIATRY | Facility: CLINIC | Age: 29
End: 2024-07-01
Payer: COMMERCIAL

## 2024-07-03 ENCOUNTER — PATIENT MESSAGE (OUTPATIENT)
Dept: PRIMARY CARE CLINIC | Facility: CLINIC | Age: 29
End: 2024-07-03
Payer: COMMERCIAL

## 2024-07-03 ENCOUNTER — PATIENT MESSAGE (OUTPATIENT)
Dept: NEUROLOGY | Facility: CLINIC | Age: 29
End: 2024-07-03
Payer: COMMERCIAL

## 2024-07-03 ENCOUNTER — OFFICE VISIT (OUTPATIENT)
Dept: PSYCHIATRY | Facility: CLINIC | Age: 29
End: 2024-07-03
Payer: COMMERCIAL

## 2024-07-03 ENCOUNTER — PATIENT MESSAGE (OUTPATIENT)
Dept: PSYCHIATRY | Facility: CLINIC | Age: 29
End: 2024-07-03
Payer: COMMERCIAL

## 2024-07-03 VITALS
HEART RATE: 90 BPM | BODY MASS INDEX: 20.67 KG/M2 | DIASTOLIC BLOOD PRESSURE: 73 MMHG | WEIGHT: 105.81 LBS | SYSTOLIC BLOOD PRESSURE: 110 MMHG

## 2024-07-03 DIAGNOSIS — F31.30 BIPOLAR I DISORDER, MOST RECENT EPISODE (OR CURRENT) DEPRESSED: Primary | ICD-10-CM

## 2024-07-03 DIAGNOSIS — F90.0 ATTENTION DEFICIT HYPERACTIVITY DISORDER (ADHD), PREDOMINANTLY INATTENTIVE TYPE: ICD-10-CM

## 2024-07-03 DIAGNOSIS — Z63.0 PARTNER RELATIONAL PROBLEM: ICD-10-CM

## 2024-07-03 PROCEDURE — 99999 PR PBB SHADOW E&M-EST. PATIENT-LVL III: CPT | Mod: PBBFAC,,, | Performed by: PSYCHOLOGIST

## 2024-07-03 RX ORDER — DEXTROAMPHETAMINE SACCHARATE, AMPHETAMINE ASPARTATE MONOHYDRATE, DEXTROAMPHETAMINE SULFATE, AMPHETAMINE SULFATE 12.5; 12.5; 12.5; 12.5 MG/1; MG/1; MG/1; MG/1
1 CAPSULE, EXTENDED RELEASE ORAL EVERY MORNING
Qty: 30 EACH | Refills: 0 | Status: SHIPPED | OUTPATIENT
Start: 2024-07-03 | End: 2024-08-04

## 2024-07-03 RX ORDER — DEXTROAMPHETAMINE SACCHARATE, AMPHETAMINE ASPARTATE MONOHYDRATE, DEXTROAMPHETAMINE SULFATE, AMPHETAMINE SULFATE 12.5; 12.5; 12.5; 12.5 MG/1; MG/1; MG/1; MG/1
1 CAPSULE, EXTENDED RELEASE ORAL EVERY MORNING
Qty: 30 EACH | Refills: 0 | Status: SHIPPED | OUTPATIENT
Start: 2024-07-03 | End: 2024-07-03 | Stop reason: SDUPTHER

## 2024-07-03 SDOH — SOCIAL DETERMINANTS OF HEALTH (SDOH): PROBLEMS IN RELATIONSHIP WITH SPOUSE OR PARTNER: Z63.0

## 2024-07-03 NOTE — PROGRESS NOTES
Outpatient Psychiatry Follow-Up Visit     7/3/2024    Clinical Status of Patient:  Outpatient (Ambulatory)    Chief Complaint:  Iris Johnson is a 29 y.o. female who presents today for follow-up of mood and inattention/distractibility .      Preferred Name: rIis  Gender Identity: cis female  Preferred Pronouns: she/her    LAST VISIT: Iris attended her virtual visit. She said that she is doing okay--she is no longer nursing, and that's been emotional for her. Her supply dropped, and baby was losing weight. She has been done for about a week since he has latched. She is emotional about it--tearful. She wants to increase her Seroquel and Adderall. She said that she is not sleeping well at night. Her  mostly does the night shift for their son to give her a chance to sleep. They still have not changed her name. We agreed to increase her Seroquel and Adderall this round. Her other son, Mat, is doing well.     Plan--continue Lamictal 200 mg; increase Seroquel 300 mg hs; increase Adderall 20 mg bid (sent 3 scripts);  to drive her to change her name;    CURRENT PRESENTATION: Iris attended her visit. She wants therapy--discussed the step program and will put in a referral. She brought up instances in her past--many problems with her step-mom. She blames them for taking her son. She still has not changed her name. She is struggling with changing it because she wants the same last name as her first son, Mat.     She reported that she had the increased Seroquel but then starting getting the 200 mg--reported is not sleeping. She reported that she sweats a lot at night--encouraged her to schedule with GYN to follow-up.    Plan--continue Lamictal 200 mg; Seroquel 300 mg hs; trial of Mydayis 50 mg (sent 1 script); Message me about stimulant--will need 2 additional scripts; consider couples counseling    Katherine (son) will be 1 soon (Sept 19th)  Mat (older son) will be 10 in May--he lives with her dad  and step-mom.     from April 2024.        Prior medicines: Ambien, Lamictal, Vyvanse, clonazepam  ____________________________________________________________    PSYCHOTHERAPY      Site: [facility]  Time: 16 minutes  Participants: Met with patient    Therapeutic Intervention Type: behavior modifying psychotherapy, supportive psychotherapy  Why chosen therapy is appropriate versus another modality: patient responds to this modality, evidence based practice    Target symptoms: anxiety , mood disorder, relational  Primary focus: see above    Outcome monitoring methods: self-report, observation    Patient's response to intervention:  The patient's response to intervention is accepting.    Progress toward goals:  The patient's progress toward goals is fair .        4/15/2024     9:54 AM 1/3/2024     8:54 AM 11/30/2023     2:54 PM   GAD7   1. Feeling nervous, anxious, or on edge? 1 1 3   2. Not being able to stop or control worrying? 1 1 2   3. Worrying too much about different things? 1 1 1   4. Trouble relaxing? 1 1 1   5. Being so restless that it is hard to sit still? 1 1 3   6. Becoming easily annoyed or irritable? 1 1 1   7. Feeling afraid as if something awful might happen? 1 1 1   NOLVIA-7 Score 7 7 12      0-4 = Minimal anxiety  5-9 = Mild anxiety  10-14 = Moderate anxiety  15-21 = Severe anxiety         8/1/2022     9:05 AM   Depression Patient Health Questionnaire   Over the last two weeks how often have you been bothered by little interest or pleasure in doing things Several days   Over the last two weeks how often have you been bothered by feeling down, depressed or hopeless Several days   PHQ-2 Total Score 2     0-4 = No intervention  5 to 9 = Mild  10 to 14 = Moderate  15 to 19 = Moderately severe  =20 = Severe    Review of Systems   PSYCHIATRIC: Pertinant items are noted in the narrative.    Past Medical, Family and Social History: The patient's past medical, family and social history have  been reviewed and updated as appropriate within the electronic medical record - see encounter notes.      Current Outpatient Medications:     dextroamphetamine-amphetamine (MYDAYIS) 50 mg CT24, Take 1 tablet by mouth every morning., Disp: 30 each, Rfl: 0    eletriptan (RELPAX) 40 MG tablet, 1 tab PO PRN migraine. May repeat once in 2 hours. Use no more than 10 days per month., Disp: 10 tablet, Rfl: 11    fremanezumab-vfrm (AJOVY AUTOINJECTOR) 225 mg/1.5 mL autoinjector, Inject 1.5 mLs (225 mg total) into the skin every 28 days., Disp: 1 each, Rfl: 11    lamoTRIgine (LAMICTAL) 200 MG tablet, Take 1 tablet (200 mg total) by mouth every morning., Disp: 30 tablet, Rfl: 5    norethindrone (MICRONOR) 0.35 mg tablet, Take 1 tablet (0.35 mg total) by mouth once daily., Disp: 30 tablet, Rfl: 11    QUEtiapine (SEROQUEL) 300 MG Tab, Take 1 tablet (300 mg total) by mouth every evening., Disp: 30 tablet, Rfl: 5    rimegepant (NURTEC) 75 mg odt, Take 1 tablet (75 mg total) by mouth daily as needed for Migraine. Place ODT tablet on the tongue; alternatively the ODT tablet may be placed under the tongue, Disp: 8 tablet, Rfl: 11    Current Facility-Administered Medications:     onabotulinumtoxina injection 200 Units, 200 Units, Intramuscular, q12 weeks, Deb Aguirre, NP, 200 Units at 08/31/23 1516    onabotulinumtoxina injection 200 Units, 200 Units, Intramuscular, q12 weeks, Deb Aguirre, NP, 200 Units at 05/13/24 1425    Compliance: yes    Side effects: see above    Risk Parameters:  Patient reports no suicidal ideation  Patient reports no homicidal ideation  Patient reports no self-injurious behavior  Patient reports no violent behavior    Exam (detailed: at least 9 elements; comprehensive: all 15 elements)   Constitutional  Vitals:  Most recent vital signs were reviewed.   Last 3 sets of Vitals        5/13/2024     2:05 PM 5/27/2024     9:59 AM 7/3/2024     9:08 AM   Vitals - 1 value per visit   SYSTOLIC 116  110    DIASTOLIC 81  73   Pulse 81  90   Temp 97.3 °F (36.3 °C)     Resp 17     Weight (lb) 128.75  105.82   Weight (kg) 58.4  48   Height 5' (1.524 m)     BMI (Calculated) 25.1     Pain Score Two Eight           General:  age appropriate, casually dressed, neatly groomed, sunglasses, hair pulled back     Musculoskeletal  Muscle Strength/Tone:  no tremor, no tic   Gait & Station:  non-ataxic     Psychiatric  Speech:  no latency; no press   Behavior: wnl   Mood & Affect:  Depressed, irritable?  congruent and appropriate, tearful   Thought Process:  normal and logical   Associations:  intact   Thought Content:  normal, no suicidality, no homicidality, delusions, or paranoia   Insight:  has awareness of illness   Judgement: behavior is adequate to circumstances   Orientation:  grossly intact   Memory: intact for content of interview   Language: grossly intact   Attention Span & Concentration:  Grossly intact   Fund of Knowledge:  intact and appropriate to age and level of education     Assessment and Diagnosis   Status/Progress: Based on the examination today, the patient's problem(s) is/are adequately but not ideally controlled.  New problems have been presented today.   Co-morbidities and psychosocial stressors  are complicating management of the primary condition.  There are no active rule-out diagnoses for this patient at this time.     General Impression:     Encounter Diagnoses   Name Primary?    Bipolar I disorder, most recent episode (or current) depressed Yes    Attention deficit hyperactivity disorder (ADHD), predominantly inattentive type     Partner relational problem        Intervention/Counseling/Treatment Plan   Medication Management: Discussed risks, benefits, and alternatives to treatment plan documented above with patient. I answered all patient questions related to this plan, and patient expressed understanding and agreement.   continue Lamictal 200 mg; Seroquel 300 mg hs; trial of Mydayis 50 mg (sent 1  script)  Referred to STEP therapy  Message me about stimulant--will need 2 additional scripts    Return to Clinic: 3 months    Medication List with Changes/Refills   New Medications    DEXTROAMPHETAMINE-AMPHETAMINE (MYDAYIS) 50 MG CT24    Take 1 tablet by mouth every morning.   Current Medications    ELETRIPTAN (RELPAX) 40 MG TABLET    1 tab PO PRN migraine. May repeat once in 2 hours. Use no more than 10 days per month.    FREMANEZUMAB-VFRM (AJOVY AUTOINJECTOR) 225 MG/1.5 ML AUTOINJECTOR    Inject 1.5 mLs (225 mg total) into the skin every 28 days.    LAMOTRIGINE (LAMICTAL) 200 MG TABLET    Take 1 tablet (200 mg total) by mouth every morning.    NORETHINDRONE (MICRONOR) 0.35 MG TABLET    Take 1 tablet (0.35 mg total) by mouth once daily.    QUETIAPINE (SEROQUEL) 300 MG TAB    Take 1 tablet (300 mg total) by mouth every evening.    RIMEGEPANT (NURTEC) 75 MG ODT    Take 1 tablet (75 mg total) by mouth daily as needed for Migraine. Place ODT tablet on the tongue; alternatively the ODT tablet may be placed under the tongue   Discontinued Medications    DEXTROAMPHETAMINE-AMPHETAMINE (ADDERALL) 20 MG TABLET    Take 1 tablet by mouth 2 (two) times a day.    DEXTROAMPHETAMINE-AMPHETAMINE (ADDERALL) 20 MG TABLET    Take 1 tablet by mouth 2 (two) times a day.    DEXTROAMPHETAMINE-AMPHETAMINE (ADDERALL) 20 MG TABLET    Take 1 tablet by mouth 2 (two) times a day.         I spent an additional 15 minutes performing E/M services with >50% spent on counseling, guidance, coordinating care (not Psychotherapy related) in addition to the 16 minutes performing Psychotherapy.    Time spent with pt including note preparation: 31 minutes     Tiffanie Galvez, PhD, MP  Advanced Practice Medical Psychologist  [facility]--[address]  [place] [id number]  [phone]   [phone] fax

## 2024-07-03 NOTE — PATIENT INSTRUCTIONS

## 2024-07-08 ENCOUNTER — TELEPHONE (OUTPATIENT)
Dept: PSYCHIATRY | Facility: CLINIC | Age: 29
End: 2024-07-08
Payer: COMMERCIAL

## 2024-07-08 ENCOUNTER — PATIENT MESSAGE (OUTPATIENT)
Dept: PSYCHIATRY | Facility: CLINIC | Age: 29
End: 2024-07-08
Payer: COMMERCIAL

## 2024-07-10 ENCOUNTER — OFFICE VISIT (OUTPATIENT)
Dept: PSYCHIATRY | Facility: CLINIC | Age: 29
End: 2024-07-10
Payer: COMMERCIAL

## 2024-07-10 ENCOUNTER — DOCUMENTATION ONLY (OUTPATIENT)
Dept: PSYCHIATRY | Facility: CLINIC | Age: 29
End: 2024-07-10
Payer: COMMERCIAL

## 2024-07-10 DIAGNOSIS — F90.0 ATTENTION DEFICIT HYPERACTIVITY DISORDER (ADHD), PREDOMINANTLY INATTENTIVE TYPE: ICD-10-CM

## 2024-07-10 DIAGNOSIS — F31.30 BIPOLAR I DISORDER, MOST RECENT EPISODE (OR CURRENT) DEPRESSED: ICD-10-CM

## 2024-07-10 PROCEDURE — 99999 PR PBB SHADOW E&M-EST. PATIENT-LVL II: CPT | Mod: PBBFAC,,,

## 2024-07-10 RX ORDER — DEXTROAMPHETAMINE SACCHARATE, AMPHETAMINE ASPARTATE, DEXTROAMPHETAMINE SULFATE AND AMPHETAMINE SULFATE 5; 5; 5; 5 MG/1; MG/1; MG/1; MG/1
1 TABLET ORAL 2 TIMES DAILY
Qty: 60 TABLET | Refills: 0 | Status: SHIPPED | OUTPATIENT
Start: 2024-07-10 | End: 2024-08-09
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
## 2024-07-22 ENCOUNTER — PATIENT MESSAGE (OUTPATIENT)
Dept: NEUROLOGY | Facility: CLINIC | Age: 29
End: 2024-07-22
Payer: COMMERCIAL

## 2024-07-22 ENCOUNTER — TELEPHONE (OUTPATIENT)
Dept: NEUROLOGY | Facility: CLINIC | Age: 29
End: 2024-07-22
Payer: COMMERCIAL

## 2024-07-22 ENCOUNTER — TELEPHONE (OUTPATIENT)
Dept: PSYCHIATRY | Facility: CLINIC | Age: 29
End: 2024-07-22
Payer: COMMERCIAL

## 2024-07-22 NOTE — TELEPHONE ENCOUNTER
Pt called in to call center, never transferred call to the clinic. Checked and patient sent message. Will address there.

## 2024-07-24 ENCOUNTER — OFFICE VISIT (OUTPATIENT)
Dept: PSYCHIATRY | Facility: CLINIC | Age: 29
End: 2024-07-24
Payer: COMMERCIAL

## 2024-07-24 DIAGNOSIS — F90.0 ATTENTION DEFICIT HYPERACTIVITY DISORDER (ADHD), PREDOMINANTLY INATTENTIVE TYPE: ICD-10-CM

## 2024-07-24 DIAGNOSIS — Z63.0 PARTNER RELATIONAL PROBLEM: ICD-10-CM

## 2024-07-24 DIAGNOSIS — F31.30 BIPOLAR I DISORDER, MOST RECENT EPISODE (OR CURRENT) DEPRESSED: Primary | ICD-10-CM

## 2024-07-24 SDOH — SOCIAL DETERMINANTS OF HEALTH (SDOH): PROBLEMS IN RELATIONSHIP WITH SPOUSE OR PARTNER: Z63.0

## 2024-07-24 NOTE — PROGRESS NOTES
[facility]  Individual Psychotherapy (PhD/LCSW)    7/24/2024    Site:  [facility]         Therapeutic Intervention: Met with patient.  Outpatient - Insight oriented psychotherapy 45 min - CPT code 72635     Chief complaint/reason for encounter: depression and anxiety     Content of current session: Iris came into the session today and rated herself a 7, stating that the past week was good. The patient reported that the family went on vacation last week and her oldest son was with them as well. She enjoys the lamb between her two boys. Iris also reported that today is her wedding anniversary of three years. She's looking forward to celebrating with her  later. She also mentioned that today made eleven years of sobriety. SW praised the patient for the accomplishments she achieved and highlighted her strength and resiliency. She reported that her Father-in-law didn't like the idea of a sobriety anniversary celebration initially but, today she received a text from him acknowledging the achievement of sobriety over the years.       [facility], Session 1 (Treatment Initiation)  Session Focus:  Brief check-in  Set agenda  Collect rating scales __PHQ: 22 NOLVIA: 21  Introduction to Therapy and CBT  Treatment Plan/Goals  Review Values and Aspirations  Intervention techniques (if able): __discussed__   Address patient concerns  Summarize session  Feedback about session    Action Plan:  Review therapy materials  Intervention practice:   Use Cognitive-Behavioral Model Diagram for 3 situations   Complete the Bridging Sessions worksheet for Session 2  Ways to overcome obstacles: N/A        07/24/2024    Overall Treatment Plan: _____   Values and Aspirations: _Relationship, self improvement, productivity heath____    Problem List / Patients Goals and Evidence-Based Interventions (include up to 5):    Problem #1: Husband_  Goal: _To have more communication and intimacy.  Therapy Interventions: _      Non-Therapy  Strategies: ___    Problem #2: _Self-improvement   Goal: _Improve self image, find my confidence that got beat down over the years.  Therapy Interventions: _   Non-Therapy Strategies: ___    Problem #3: _Health  Goal: _Manage thoughts and physical health   Therapy Interventions: _  Non-Therapy Strategies: ___    Problem #4: Productivity  Goal: _Get things done, maintain better focus and be motivated.   Therapy Interventions: ___   Non-Therapy Strategies: ___    Problem #5:   Goal: _  Therapy Interventions: ___   Non-Therapy Strategies: ___        Treatment plan:  Target symptoms: depression, anxiety   Why chosen therapy is appropriate versus another modality: evidence based practice  Outcome monitoring methods: self-report, observation, checklist/rating scale  Therapeutic intervention type: insight oriented psychotherapy    Risk parameters:  Patient reports no suicidal ideation  Patient reports no homicidal ideation  Patient reports no self-injurious behavior  Patient reports no violent behavior    Verbal deficits: None    Patient's response to intervention:  The patient's response to intervention is accepting.    Progress toward goals and other mental status changes:  The patient's progress toward goals is good.    Diagnosis:   Name Primary?    Bipolar I disorder, most recent episode (or current) depressed Yes    Attention deficit hyperactivity disorder (ADHD), predominantly inattentive type      Partner relational problem         Plan:  individual psychotherapy    Return to clinic: 1 week    Length of Service (minutes): 45

## 2024-07-29 ENCOUNTER — TELEPHONE (OUTPATIENT)
Dept: PSYCHIATRY | Facility: CLINIC | Age: 29
End: 2024-07-29
Payer: COMMERCIAL

## 2024-07-31 ENCOUNTER — OFFICE VISIT (OUTPATIENT)
Dept: PSYCHIATRY | Facility: CLINIC | Age: 29
End: 2024-07-31
Payer: COMMERCIAL

## 2024-07-31 DIAGNOSIS — F31.30 BIPOLAR I DISORDER, MOST RECENT EPISODE (OR CURRENT) DEPRESSED: Primary | ICD-10-CM

## 2024-07-31 DIAGNOSIS — F90.0 ATTENTION DEFICIT HYPERACTIVITY DISORDER (ADHD), PREDOMINANTLY INATTENTIVE TYPE: ICD-10-CM

## 2024-07-31 DIAGNOSIS — Z63.0 PARTNER RELATIONAL PROBLEM: ICD-10-CM

## 2024-07-31 SDOH — SOCIAL DETERMINANTS OF HEALTH (SDOH): PROBLEMS IN RELATIONSHIP WITH SPOUSE OR PARTNER: Z63.0

## 2024-07-31 NOTE — PROGRESS NOTES
Patient is in the office today for a 4 month follow up. Patient states he has some back and right groin soreness on and off.      1. Have you been to the ER, urgent care clinic since your last visit? Hospitalized since your last visit? No    2. Have you seen or consulted any other health care providers outside of the [address] [place] [address] since your last visit? Include any pap smears or colon screening.  No [facility]  Individual Psychotherapy (PhD/LCSW)    7/31/2024    Site:  [place]         Therapeutic Intervention: Met with patient.  Outpatient - Insight oriented psychotherapy 45 min - CPT code 91438    Chief complaint/reason for encounter: depression and anxiety     Content of current session: The patient , Iris, presented today with her 10 month old son. She mentioned that she enjoyed the wedding anniversary celebration with her spouse. SW praised the Pt and encouraged the idea of additional quality time together. Iris stated that her father in law was the only one to acknowledge her sobriety anniversary. She said she was a bit sad and disappointed that her  didn't mention it. SW provided supportive feedback. She shared her thoughts and emotions regarding the problems she's facing and her reactions to them. Iris exhibited understanding of the information presented. Pt collaborated on the treatment plan and was provided with an action plan to complete at home. SW provided the patient with a blue folder and advised to use for action plan and return to following sessions.    [facility], Session _2_ (Identifying Unhelpful Thinking)  Session Focus:  Brief check-in  Set agenda  Collect rating scales _PHQ: 20 NOLVIA: 20  Review action plan  Review Treatment Plan and Values/Aspirations as needed  Identify unhelpful thinking, Unhelpful thinking patterns  Intervention techniques: Cognitive restructuring  Summarize session  Feedback about session    Action Plan:  Review therapy materials  Intervention practice:   Use triangle diagram for 5 situations  Keep a record of automatic thoughts  Ways to overcome obstacles: Pt given a blue folder after reporting homework left at home as she didn't see it sitting on the table.  _____      07/31/2024    Overall Treatment Plan: _____   Values and Aspirations: _Relationship, self improvement, productivity heath____    Problem List / Patients Goals and Evidence-Based  Interventions (include up to 5):    Problem #1: Husband_  Goal: To have more communication and intimacy.  Therapy Interventions: Review values and aspirations      Non-Therapy Strategies: ___    Problem #2: _Self-improvement   Goal: _Improve self image, find my confidence that got beat down over the years.  Therapy Interventions: Identify unhelpful thinking patterns, reconstructuring thoughts.   Non-Therapy Strategies: ___    Problem #3: _Health  Goal: Manage thoughts and physical health   Therapy Interventions: Get treatment for medical issues, stay active in/outside of therapy.  Non-Therapy Strategies: ___    Problem #4: Productivity  Goal: _Get things done, maintain better focus and be motivated.   Therapy Interventions: Review values and aspirations    Non-Therapy Strategies: ___    Problem #5: Water on me  Goal: Enjoying time in the water for fun. Showering more often during the week.  Therapy Interventions: Identify unhelpful thinking patterns, reconstructuring thoughts, exposure   Non-Therapy Strategies: 20 minute shower 1x week, One hour recreational swim with family/friends.       Treatment plan:  Target symptoms: depression, anxiety   Why chosen therapy is appropriate versus another modality: evidence based practice  Outcome monitoring methods: self-report, observation, checklist/rating scale  Therapeutic intervention type: insight oriented psychotherapy    Risk parameters:  Patient reports no suicidal ideation  Patient reports no homicidal ideation  Patient reports no self-injurious behavior  Patient reports no violent behavior    Verbal deficits: None    Patient's response to intervention:  The patient's response to intervention is accepting.    Progress toward goals and other mental status changes:  The patient's progress toward goals is good.    Diagnosis:   Diagnosis:   Name Primary?    Bipolar I disorder, most recent episode (or current) depressed Yes    Attention deficit hyperactivity disorder  (ADHD), predominantly inattentive type      Partner relational problem          Plan:  individual psychotherapy    Return to clinic: 1 week    Length of Service (minutes): 45      Zuly French LMSW  - Dept of Psychiatry   [facility]  [address] [place] [address]

## 2024-08-05 ENCOUNTER — PATIENT MESSAGE (OUTPATIENT)
Dept: PSYCHIATRY | Facility: CLINIC | Age: 29
End: 2024-08-05
Payer: COMMERCIAL

## 2024-08-05 ENCOUNTER — TELEPHONE (OUTPATIENT)
Dept: PSYCHIATRY | Facility: CLINIC | Age: 29
End: 2024-08-05
Payer: COMMERCIAL

## 2024-08-07 ENCOUNTER — OFFICE VISIT (OUTPATIENT)
Dept: PSYCHIATRY | Facility: CLINIC | Age: 29
End: 2024-08-07
Payer: COMMERCIAL

## 2024-08-07 DIAGNOSIS — F90.0 ATTENTION DEFICIT HYPERACTIVITY DISORDER (ADHD), PREDOMINANTLY INATTENTIVE TYPE: ICD-10-CM

## 2024-08-07 DIAGNOSIS — F31.30 BIPOLAR I DISORDER, MOST RECENT EPISODE (OR CURRENT) DEPRESSED: Primary | ICD-10-CM

## 2024-08-07 DIAGNOSIS — Z63.0 PARTNER RELATIONAL PROBLEM: ICD-10-CM

## 2024-08-07 SDOH — SOCIAL DETERMINANTS OF HEALTH (SDOH): PROBLEMS IN RELATIONSHIP WITH SPOUSE OR PARTNER: Z63.0

## 2024-08-07 NOTE — PROGRESS NOTES
[facility]  Individual Psychotherapy (LCSW)    8/7/2024    Site:  [facility]         Therapeutic Intervention: Met with patient.  Outpatient - Insight oriented psychotherapy 45 min - CPT code 85535    Chief complaint/reason for encounter: depression and anxiety     Content of current session: Iris presented today for session three of [facility]. She provided SW with feedback about the action plan that was completed. rIis reflected on her spouse being involved in helping her with time spent in water. SW praised the Pt and acknowledged the motivation to do the at home interventions. Iris reflected on thoughts and emotions regarding her current challenges and her reponses to them. Iris exhibited understanding of the information presented. Pt collaborated on the treatment plan and was provided with an action plan to complete at home. SW encouraged the patient to complete the action plan and return to the following session.     [facility], Session _3__ ( Challenging Unhelpful Thinking)  Session Focus:  Brief check-in  Set agenda  Collect rating scales PHQ: 19 NOLVIA: 18  Review action plan  Review Treatment Plan and Values/Aspirations as needed  Identify unhelpful thinking, Unhelpful thinking patterns  Intervention techniques: Cognitive Restructuring  Summarize session  Feedback about session    Action Plan:  Review therapy materials  Intervention practice:   Use the triangle diagram for 5 thoughts  Keep a record of automatic thoughts  Ways to overcome obstacles: N/A  _____    08/07/2024    Overall Treatment Plan: _____   Values and Aspirations: _Relationship, self improvement, productivity heath____    Problem List / Patients Goals and Evidence-Based Interventions (include up to 5):    Problem #1: Husband_  Goal: To have more communication and intimacy.  Therapy Interventions: Review values and aspirations      Non-Therapy Strategies: Dates, love language assessment.   Problem #2: _Self-improvement   Goal: _Improve self  image, find my confidence that got beat down over the years.  Therapy Interventions: Identify unhelpful thinking patterns, reconstructuring thoughts.   Non-Therapy Strategies: ___    Problem #3: _Health  Goal: Manage thoughts and physical health   Therapy Interventions: Get treatment for medical issues, stay active in/outside of therapy.  Non-Therapy Strategies: Dance and exercise     Problem #4: Productivity  Goal: _Get things done, maintain better focus and be motivated.   Therapy Interventions: Review values and aspirations    Non-Therapy Strategies: ___    Problem #5: Water on me  Goal: Enjoying time in the water for fun. Showering more often during the week.  Therapy Interventions: Identify unhelpful thinking patterns, reconstructuring thoughts, exposure   Non-Therapy Strategies: 20 minute shower 1x week, One hour recreational swim with family/friends.        Treatment plan:  Target symptoms: depression, anxiety   Why chosen therapy is appropriate versus another modality: evidence based practice  Outcome monitoring methods: self-report, observation, checklist/rating scale  Therapeutic intervention type: insight oriented psychotherapy    Risk parameters:  Patient reports no suicidal ideation  Patient reports no homicidal ideation  Patient reports no self-injurious behavior  Patient reports no violent behavior    Verbal deficits: None    Patient's response to intervention:  The patient's response to intervention is accepting, motivated.    Progress toward goals and other mental status changes:  The patient's progress toward goals is good.    Diagnosis:   Name Primary?    Bipolar I disorder, most recent episode (or current) depressed Yes    Attention deficit hyperactivity disorder (ADHD), predominantly inattentive type      Partner relational problem        Plan:  individual psychotherapy    Return to clinic: 1 week    Length of Service (minutes): 45      Zuly French LMSW  - Dept of Psychiatry    [facility]  [address] [place] [address]

## 2024-08-12 ENCOUNTER — TELEPHONE (OUTPATIENT)
Dept: PSYCHIATRY | Facility: CLINIC | Age: 29
End: 2024-08-12
Payer: COMMERCIAL

## 2024-08-14 ENCOUNTER — PATIENT MESSAGE (OUTPATIENT)
Dept: PSYCHIATRY | Facility: CLINIC | Age: 29
End: 2024-08-14

## 2024-08-14 ENCOUNTER — OFFICE VISIT (OUTPATIENT)
Dept: PSYCHIATRY | Facility: CLINIC | Age: 29
End: 2024-08-14
Payer: COMMERCIAL

## 2024-08-14 DIAGNOSIS — F31.30 BIPOLAR I DISORDER, MOST RECENT EPISODE (OR CURRENT) DEPRESSED: Primary | ICD-10-CM

## 2024-08-14 DIAGNOSIS — F41.9 ANXIETY: ICD-10-CM

## 2024-08-14 DIAGNOSIS — F90.0 ATTENTION DEFICIT HYPERACTIVITY DISORDER (ADHD), PREDOMINANTLY INATTENTIVE TYPE: ICD-10-CM

## 2024-08-14 DIAGNOSIS — Z63.0 PARTNER RELATIONAL PROBLEM: ICD-10-CM

## 2024-08-14 SDOH — SOCIAL DETERMINANTS OF HEALTH (SDOH): PROBLEMS IN RELATIONSHIP WITH SPOUSE OR PARTNER: Z63.0

## 2024-08-14 NOTE — PROGRESS NOTES
"[facility]  Individual Psychotherapy (LMSW)    8/14/2024    Site:  Telemed       45 minutes of total time spent on the encounter, which includes face to face time and non-face to face time preparing to see the patient (eg, review of referral), Obtaining and/or reviewing separately obtained history, Documenting information in the electronic or other health record, Independently interpreting results of research (not separately reported) and communicating results to the patient/family/caregiver.  Each patient to whom he or she provides medical services by telemedicine is:  (1) informed of the relationship between the physician and patient and the respective role of any other health care provider with respect to management of the patient; and (2) notified that he or she may decline to receive medical services by telemedicine and may withdraw from such care at any time.    Therapeutic Intervention: Met with patient.  Outpatient - Insight oriented psychotherapy 45 min - CPT code 54306    Chief complaint/reason for encounter: depression and anxiety     Content of current session: SW met with patient via telehealth for a session in the [facility] program. The patient reflected on the quality time spent with her  and their scheduled activity for the next day. SW led the patient in practicing alternative thoughts from the action plan she worked on, "thought record", over the past week. Pt collaborated on the treatment plan and was provided with an action plan to complete at home. SW encouraged the patient to complete the action plan and return to the following session.     [facility], Session _4_ (Challenging Unhelpful Thinking, Part 2)  Session Focus:  Brief check-in  Set agenda  Collect rating scales PHQ: 18  Review action plan  Review Treatment Plan and Values/Aspirations as needed  Review Thought Records  Intervention techniques: Cognitive Restructuring  Summarize session  Feedback about session    Action " Plan:  Review therapy materials  Intervention practice:  Complete one Thought Record every day  Ways to overcome obstacles: N/A  _____    08/14/2024    Overall Treatment Plan: _____   Values and Aspirations: _Relationship, self improvement, productivity heath____    Problem List / Patients Goals and Evidence-Based Interventions (include up to 5):    Problem #1: Husband_  Goal: To have more communication and intimacy.  Therapy Interventions: Review values and aspirations      Non-Therapy Strategies: Dates, love language assessment.   Problem #2: _Self-improvement   Goal: _Improve self image, find my confidence that got beat down over the years.  Therapy Interventions: Identify unhelpful thinking patterns, reconstructuring thoughts.   Non-Therapy Strategies: ___    Problem #3: _Health  Goal: Manage thoughts and physical health   Therapy Interventions: Get treatment for medical issues, stay active in/outside of therapy.  Non-Therapy Strategies: Dance and exercise     Problem #4: Productivity  Goal: _Get things done, maintain better focus and be motivated.   Therapy Interventions: Review values and aspirations    Non-Therapy Strategies: ___    Problem #5: Water on me  Goal: Enjoying time in the water for fun. Showering more often during the week.  Therapy Interventions: Identify unhelpful thinking patterns, reconstructuring thoughts, exposure   Non-Therapy Strategies: 20 minute shower 1x week, One hour recreational swim with family/friends.       Treatment plan:  Target symptoms: depression, anxiety   Why chosen therapy is appropriate versus another modality: evidence based practice  Outcome monitoring methods: self-report, checklist/rating scale  Therapeutic intervention type: insight oriented psychotherapy    Risk parameters:  Patient reports no suicidal ideation  Patient reports no homicidal ideation  Patient reports no self-injurious behavior  Patient reports no violent behavior    Verbal deficits:  None    Patient's response to intervention:  The patient's response to intervention is motivated.    Progress toward goals and other mental status changes:  The patient's progress toward goals is fair .    Diagnosis:   Name Primary?    Bipolar I disorder, most recent episode (or current) depressed Yes    Attention deficit hyperactivity disorder (ADHD), predominantly inattentive type      Partner relational problem        Plan:  individual psychotherapy    Return to clinic: 1 week    Length of Service (minutes): 45      Zuly French LMSW  - Dept of Psychiatry   [facility]  [id number] [place] [address].  [place] [address]

## 2024-08-19 ENCOUNTER — TELEPHONE (OUTPATIENT)
Dept: PSYCHIATRY | Facility: CLINIC | Age: 29
End: 2024-08-19
Payer: COMMERCIAL

## 2024-08-26 ENCOUNTER — TELEPHONE (OUTPATIENT)
Dept: PSYCHIATRY | Facility: CLINIC | Age: 29
End: 2024-08-26
Payer: COMMERCIAL

## 2024-08-26 PROBLEM — Z37.9 VACUUM-ASSISTED VAGINAL DELIVERY: Status: RESOLVED | Noted: 2023-09-19 | Resolved: 2024-08-26

## 2024-08-27 ENCOUNTER — DOCUMENTATION ONLY (OUTPATIENT)
Dept: PSYCHIATRY | Facility: CLINIC | Age: 29
End: 2024-08-27
Payer: COMMERCIAL

## 2024-08-27 ENCOUNTER — PATIENT MESSAGE (OUTPATIENT)
Dept: PSYCHIATRY | Facility: CLINIC | Age: 29
End: 2024-08-27
Payer: COMMERCIAL

## 2024-08-27 ENCOUNTER — TELEPHONE (OUTPATIENT)
Dept: PSYCHIATRY | Facility: CLINIC | Age: 29
End: 2024-08-27
Payer: COMMERCIAL

## 2024-08-27 ENCOUNTER — TELEPHONE (OUTPATIENT)
Dept: NEUROLOGY | Facility: CLINIC | Age: 29
End: 2024-08-27
Payer: COMMERCIAL

## 2024-08-27 NOTE — PROGRESS NOTES
"[facility]  Individual Psychotherapy (PhD/LCSW)    8/21/2024    Site:Telemed  45 minutes of total time spent on the encounter, which includes face to face time and non-face to face time preparing to see the patient (eg, review of referral), Obtaining and/or reviewing separately obtained history, Documenting information in the electronic or other health record, Independently interpreting results of research (not separately reported) and communicating results to the patient/family/caregiver.  Each patient to whom he or she provides medical services by telemedicine is:  (1) informed of the relationship between the physician and patient and the respective role of any other health care provider with respect to management of the patient; and (2) notified that he or she may decline to receive medical services by telemedicine and may withdraw from such care at any time.    Therapeutic Intervention: Met with patient.  Outpatient - Insight oriented psychotherapy 30 min - CPT code 93789    Chief complaint/reason for encounter: depression and anxiety     Content of current session: SW met with patient via telehealth for follow-up session in the [facility] program. SW praised the patient for her motivation to change and doing the work outside of therapy. SW led the patient in practicing alternative thoughts from the action plan she worked on, "thought record", over the past week. Pt collaborated on the treatment plan and was provided with an action plan to complete at home. SW encouraged the patient to complete the action plan and return to the following session.     [facility], Session 5 (Behavioral Activation)  Session Focus:  Brief check-in  Set agenda  Collect rating scales _____  Review action plan  Review Treatment Plan and Values/Aspirations as needed  "B" in CBT, Behavioral Activation, Identifying Activities  Intervention techniques: Behavioral Activation  Summarize session  Feedback about session    Action " Plan:  Review therapy materials  Intervention practice:   Complete the Activity Monitoring and Mood Rating Chart  Ways to overcome obstacles: N/A  _____      08/21/2024    Overall Treatment Plan: _____   Values and Aspirations: _Relationship, self improvement, productivity heath____    Problem List / Patients Goals and Evidence-Based Interventions (include up to 5):    Problem #1: Husband_  Goal: To have more communication and intimacy.  Therapy Interventions: Review values and aspirations      Non-Therapy Strategies: Dates, love language assessment.   Problem #2: _Self-improvement   Goal: _Improve self image, find my confidence that got beat down over the years.  Therapy Interventions: Identify unhelpful thinking patterns, reconstructuring thoughts.   Non-Therapy Strategies: ___    Problem #3: _Health  Goal: Manage thoughts and physical health   Therapy Interventions: Get treatment for medical issues, stay active in/outside of therapy.  Non-Therapy Strategies: Dance and exercise     Problem #4: Productivity  Goal: _Get things done, maintain better focus and be motivated.   Therapy Interventions: Review values and aspirations    Non-Therapy Strategies: ___    Problem #5: Water on me  Goal: Enjoying time in the water for fun. Showering more often during the week.  Therapy Interventions: Identify unhelpful thinking patterns, reconstructuring thoughts, exposure   Non-Therapy Strategies: 20 minute shower 1x week, One hour recreational swim with family/friends.       Treatment plan:  Target symptoms: depression, anxiety   Why chosen therapy is appropriate versus another modality: evidence based practice  Outcome monitoring methods: self-report, checklist/rating scale  Therapeutic intervention type: insight oriented psychotherapy    Risk parameters:  Patient reports no suicidal ideation  Patient reports no homicidal ideation  Patient reports no self-injurious behavior  Patient reports no violent behavior    Verbal  deficits: None    Patient's response to intervention:  The patient's response to intervention is accepting.    Progress toward goals and other mental status changes:  The patient's progress toward goals is fair .    Diagnosis:   Name Primary?    Bipolar I disorder, most recent episode (or current) depressed Yes    Attention deficit hyperactivity disorder (ADHD), predominantly inattentive type      Partner relational problem          Plan:  individual psychotherapy    Return to clinic: 1 week    Length of Service (minutes): 45        Zuly French LMSW  - Dept of Psychiatry   [facility]  [id number] [place] [address].  [place] [address]

## 2024-08-27 NOTE — TELEPHONE ENCOUNTER
SW contacted the patient and informed her of the new provider, Padilla Cooper LCSW, she is assigned to for the  STeP Brief CBT Program. Pt requested to keep the remainder of her appointments virtual. Pt agreed to SW shadowing the upcoming session. The patient expressed understanding to the information provided to her and no other assistance is needed at this time. SW will continue to be available.

## 2024-08-27 NOTE — TELEPHONE ENCOUNTER
The prior authorization for Iris Johnson's Ajovy prescription has been APPROVED FROM 8/27/24 TO 8/27/25 with copayment of $15.00.

## 2024-08-28 ENCOUNTER — PATIENT MESSAGE (OUTPATIENT)
Dept: OBSTETRICS AND GYNECOLOGY | Facility: CLINIC | Age: 29
End: 2024-08-28
Payer: COMMERCIAL

## 2024-08-28 ENCOUNTER — PATIENT MESSAGE (OUTPATIENT)
Dept: NEUROLOGY | Facility: CLINIC | Age: 29
End: 2024-08-28
Payer: COMMERCIAL

## 2024-08-28 ENCOUNTER — OFFICE VISIT (OUTPATIENT)
Dept: PSYCHIATRY | Facility: CLINIC | Age: 29
End: 2024-08-28
Payer: COMMERCIAL

## 2024-08-28 ENCOUNTER — PATIENT MESSAGE (OUTPATIENT)
Dept: PSYCHIATRY | Facility: CLINIC | Age: 29
End: 2024-08-28
Payer: COMMERCIAL

## 2024-08-28 DIAGNOSIS — F31.30 BIPOLAR I DISORDER, MOST RECENT EPISODE (OR CURRENT) DEPRESSED: Primary | ICD-10-CM

## 2024-08-28 DIAGNOSIS — F90.0 ATTENTION DEFICIT HYPERACTIVITY DISORDER (ADHD), PREDOMINANTLY INATTENTIVE TYPE: ICD-10-CM

## 2024-08-28 DIAGNOSIS — F41.9 ANXIETY: ICD-10-CM

## 2024-08-28 DIAGNOSIS — Z63.0 PARTNER RELATIONAL PROBLEM: ICD-10-CM

## 2024-08-28 PROCEDURE — 90834 PSYTX W PT 45 MINUTES: CPT | Mod: 95,,, | Performed by: SOCIAL WORKER

## 2024-08-28 SDOH — SOCIAL DETERMINANTS OF HEALTH (SDOH): PROBLEMS IN RELATIONSHIP WITH SPOUSE OR PARTNER: Z63.0

## 2024-08-30 DIAGNOSIS — F31.30 BIPOLAR I DISORDER, MOST RECENT EPISODE (OR CURRENT) DEPRESSED: ICD-10-CM

## 2024-08-30 RX ORDER — QUETIAPINE FUMARATE 200 MG/1
200 TABLET, FILM COATED ORAL NIGHTLY
Qty: 30 TABLET | Refills: 5 | Status: SHIPPED | OUTPATIENT
Start: 2024-08-30 | End: 2025-02-26

## 2024-09-04 ENCOUNTER — TELEPHONE (OUTPATIENT)
Dept: PSYCHIATRY | Facility: CLINIC | Age: 29
End: 2024-09-04
Payer: COMMERCIAL
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
## 2024-09-09 ENCOUNTER — OFFICE VISIT (OUTPATIENT)
Dept: INTERNAL MEDICINE | Facility: CLINIC | Age: 29
End: 2024-09-09
Payer: COMMERCIAL

## 2024-09-09 VITALS
BODY MASS INDEX: 20.35 KG/M2 | TEMPERATURE: 97 F | WEIGHT: 107.81 LBS | SYSTOLIC BLOOD PRESSURE: 94 MMHG | DIASTOLIC BLOOD PRESSURE: 68 MMHG | OXYGEN SATURATION: 99 % | HEART RATE: 114 BPM | HEIGHT: 61 IN

## 2024-09-09 DIAGNOSIS — F31.9 BIPOLAR AFFECTIVE DISORDER, REMISSION STATUS UNSPECIFIED: ICD-10-CM

## 2024-09-09 DIAGNOSIS — F90.0 ATTENTION DEFICIT HYPERACTIVITY DISORDER (ADHD), PREDOMINANTLY INATTENTIVE TYPE: ICD-10-CM

## 2024-09-09 DIAGNOSIS — Z00.00 ROUTINE GENERAL MEDICAL EXAMINATION AT A HEALTH CARE FACILITY: Primary | ICD-10-CM

## 2024-09-09 DIAGNOSIS — G43.719 INTRACTABLE CHRONIC MIGRAINE WITHOUT AURA AND WITHOUT STATUS MIGRAINOSUS: ICD-10-CM

## 2024-09-09 DIAGNOSIS — Z32.01 ENCOUNTER FOR PREGNANCY TEST, RESULT POSITIVE: ICD-10-CM

## 2024-09-09 LAB
B-HCG UR QL: POSITIVE
CTP QC/QA: YES

## 2024-09-09 PROCEDURE — 99395 PREV VISIT EST AGE 18-39: CPT | Mod: S$GLB,,, | Performed by: NURSE PRACTITIONER

## 2024-09-09 PROCEDURE — 99999 PR PBB SHADOW E&M-EST. PATIENT-LVL III: CPT | Mod: PBBFAC,,, | Performed by: NURSE PRACTITIONER

## 2024-09-09 PROCEDURE — 81025 URINE PREGNANCY TEST: CPT | Mod: S$GLB,,, | Performed by: NURSE PRACTITIONER

## 2024-09-09 RX ORDER — VITAMIN A, ASCORBIC ACID, CHOLECALCIFEROL, .ALPHA.-TOCOPHEROL ACETATE, DL-, THIAMINE MONONITRATE, RIBOFLAVIN, NIACINAMIDE, PYRIDOXINE HYDROCHLORIDE, FOLIC ACID, CYANOCOBALAMIN, CALCIUM CARBONATE, IRON, ZINC OXIDE, AND CUPRIC OXIDE 4000; 120; 400; 22; 1.84; 3; 20; 10; 1; 12; 200; 29; 25; 2 [IU]/1; MG/1; [IU]/1; [IU]/1; MG/1; MG/1; MG/1; MG/1; MG/1; UG/1; MG/1; MG/1; MG/1; MG/1
1 TABLET ORAL DAILY
Qty: 90 TABLET | Refills: 1 | Status: SHIPPED | OUTPATIENT
Start: 2024-09-09

## 2024-09-09 NOTE — PROGRESS NOTES
"Subjective:       Patient ID: Iris Johnson is a 29 y.o. female.    Chief Complaint: Labs Only    Pt presents to clinic today for annual exam  New to me, PCP DR. Perez   Overall doing well  Follows with pysch   Hx of ADHD, Bipolar, migraines     2 pregnancies, vag birth, full term delivery   Last cycle July 15, 2024  Missed a few of her birth controlled while on Vacation  Positive pregnancy test last week   She has reached out to her specialist to have her medications changed            BP 94/68   Pulse (!) 114   Temp 97.3 °F (36.3 °C) (Tympanic)   Ht 5' 1" (1.549 m)   Wt 48.9 kg (107 lb 12.9 oz)   LMP 07/15/2024 (Approximate)   SpO2 99%   Breastfeeding No   BMI 20.37 kg/m²     Review of Systems   Constitutional:  Negative for activity change, appetite change, chills, diaphoresis, fatigue, fever and unexpected weight change.   HENT: Negative.     Respiratory:  Negative for cough and shortness of breath.    Cardiovascular:  Negative for chest pain, palpitations and leg swelling.   Gastrointestinal: Negative.    Genitourinary: Negative.    Musculoskeletal: Negative.    Skin:  Negative for color change, pallor, rash and wound.   Allergic/Immunologic: Negative for immunocompromised state.   Neurological: Negative.  Negative for dizziness and facial asymmetry.   Hematological:  Negative for adenopathy. Does not bruise/bleed easily.   Psychiatric/Behavioral:  Negative for agitation, behavioral problems and confusion.        Objective:      Physical Exam  Vitals and nursing note reviewed.   Constitutional:       General: She is not in acute distress.     Appearance: Normal appearance. She is well-developed. She is not diaphoretic.   HENT:      Head: Normocephalic and atraumatic.   Cardiovascular:      Rate and Rhythm: Normal rate and regular rhythm.      Heart sounds: Normal heart sounds. No murmur heard.  Pulmonary:      Effort: Pulmonary effort is normal. No respiratory distress.      Breath sounds: Normal " breath sounds.   Musculoskeletal:         General: Normal range of motion.   Skin:     General: Skin is warm and dry.      Findings: No rash.   Neurological:      Mental Status: She is alert.   Psychiatric:         Mood and Affect: Mood normal.         Behavior: Behavior normal. Behavior is cooperative.         Thought Content: Thought content normal.         Judgment: Judgment normal.         Assessment:       1. Routine general medical examination at a health care facility    2. Encounter for pregnancy test, result positive    3. Intractable chronic migraine without aura and without status migrainosus    4. Bipolar affective disorder, remission status unspecified    5. Attention deficit hyperactivity disorder (ADHD), predominantly inattentive type    6. BMI 20.0-20.9, adult        Plan:       Iris was seen today for labs only.    Diagnoses and all orders for this visit:    Routine general medical examination at a health care facility    - Focus on good health habits, low fat diet, regular exercise, seatbelt use, sunscreen use   Encounter for pregnancy test, result positive  -     POCT Urine Pregnancy  -     PNV,calcium 72-iron,carb-folic (PRENATAL PLUS) 29 mg iron- 1 mg Tab; Take 1 tablet by mouth once daily.    Intractable chronic migraine without aura and without status migrainosus    Bipolar affective disorder, remission status unspecified    Attention deficit hyperactivity disorder (ADHD), predominantly inattentive type    BMI 20.0-20.9, adult      POCT pregnancy test positive  Prenatals prescribed, first trimester pregnancy reviewed with pt  Routine lab deferred at this time  Pt to make appt with [facility] for prenatal care  Keep appt with psych and Dr. Perez as scheduled  Follow up for worsening or no improvement in symptoms and PRN.

## 2024-09-16 ENCOUNTER — TELEPHONE (OUTPATIENT)
Dept: PSYCHIATRY | Facility: CLINIC | Age: 29
End: 2024-09-16
Payer: COMMERCIAL

## 2024-09-18 ENCOUNTER — PATIENT MESSAGE (OUTPATIENT)
Dept: PSYCHIATRY | Facility: CLINIC | Age: 29
End: 2024-09-18
Payer: COMMERCIAL

## 2024-09-18 ENCOUNTER — OFFICE VISIT (OUTPATIENT)
Dept: PSYCHIATRY | Facility: CLINIC | Age: 29
End: 2024-09-18
Payer: COMMERCIAL

## 2024-09-18 DIAGNOSIS — F41.9 ANXIETY: ICD-10-CM

## 2024-09-18 DIAGNOSIS — F90.0 ATTENTION DEFICIT HYPERACTIVITY DISORDER (ADHD), PREDOMINANTLY INATTENTIVE TYPE: ICD-10-CM

## 2024-09-18 DIAGNOSIS — Z63.0 PARTNER RELATIONAL PROBLEM: ICD-10-CM

## 2024-09-18 DIAGNOSIS — F31.30 BIPOLAR I DISORDER, MOST RECENT EPISODE (OR CURRENT) DEPRESSED: Primary | ICD-10-CM

## 2024-09-18 PROCEDURE — 90834 PSYTX W PT 45 MINUTES: CPT | Mod: 95,,, | Performed by: SOCIAL WORKER

## 2024-09-18 SDOH — SOCIAL DETERMINANTS OF HEALTH (SDOH): PROBLEMS IN RELATIONSHIP WITH SPOUSE OR PARTNER: Z63.0

## 2024-09-18 NOTE — PROGRESS NOTES
Individual Psychotherapy (PhD/LCSW)    9/18/2024    Site:  [place]        --Via virtual visit with synchronous audio and video.  Patient presented at home, in the [facility].   Each patient to whom medical services by telemedicine is provided is:  (1) informed of the relationship between the physician and patient and the respective role of any other health care provider with respect to management of the patient; and (2) notified that he or she may decline to receive medical services by telemedicine and may withdraw from such care at any time.      Therapeutic Intervention: Met with patient.  Outpatient - Insight oriented psychotherapy 45 min - CPT code 31181, Outpatient - Supportive psychotherapy 45 min - CPT Code 41202, and Outpatient - Interactive psychotherapy 45 min - CPT code 45108    Chief complaint/reason for encounter: attention deficit, mood swings, anxiety, and interpersonal     Interval history and content of current session: 29 year old female returned via virtual visit from home.  Following a transitional session with new therapist, in which psychosocial history was addressed, inclement weather and schedule conflict issues prevent patient follow up for two weeks. This present session represented first return to planned instructional session topics. Patient presented and discussed action plan activities she had completed for Session 5 on Behavior Activation strategies of behaviors to promote improvement in mood, reduction in depressive symptoms.   Then Session 6 planned topic of Relaxation was introduced and the reading reviewed and discussed. Rationale for Relaxation, as well as techniques, were reviewed, with the patient noting that she has some past therapy experience of being taught relaxation techniques but struggling with difficulty to implement or feel any benefit from the exercise. We discussed the educational material's acknowledgment of 3 differing relaxation tactics of 1)  "progressive muscle relaxation ((PMR)) ), 2) Deep breathing, and 3) Imagery.     [facility], Session 6 (Treatment Initiation)  Session Focus:  Brief check-in  Set agenda  Collect rating scales: deferred  Treatment Plan/Goals  Review Values and Aspirations  Intervention techniques (if able): ____  Address patient concerns  Summarize session  Feedback about session    Action Plan:  Review therapy materials  Intervention practice: _____  Ways to overcome obstacles: _____  ______      09/18/2024    Overall Treatment Plan: _____   Values and Aspirations: _____    Problem List / Patients Goals and Evidence-Based Interventions (include up to 5):    Problem #1:   Goal: Better/more communication. Better/more intimacy.   Therapy Interventions: review values and aspirations.  Non-Therapy Strategies: Dates, love language assessment.    Problem #2: desire for self-improvement   Goal:  "Improve self image, find my confidence that got beat down over the years."    Therapy Interventions: Identify unhelpful thinking patterns, restructuring thoughts.  Non-Therapy Strategies: ___    Problem #3: Health concerns.   Goal: Manage thoughts and physical health  Therapy Interventions: Get medical treatment; stay active in/outside of therapy.   Non-Therapy Strategies: Dance and exercise.    Problem #4: Productivity  Goal: Get things done, maintain better focus and be motivated.   Therapy Interventions: Review values and aspirations  Non-Therapy Strategies: ___    Problem #5:  Water on me  Goal: Enjoying time in the water for fun Showering more often during the week.    Therapy Interventions: Identify unhelpful thinking patterns, restructuring thoughts, exposure.    Non-Therapy Strategies: 20 minute shower weekly, one hour recreational swim with family /friends.        Treatment plan:  Target symptoms: distractability, recurrent depression, anxiety , mood swings, trauma history and marital discord  Why chosen therapy is appropriate " versus another modality: relevant to diagnosis, patient responds to this modality  Outcome monitoring methods: self-report, observation, checklist/rating scale  Therapeutic intervention type: insight oriented psychotherapy, interactive psychotherapy, psycho-education    Risk parameters:  Patient reports no suicidal ideation  Patient reports no homicidal ideation  Patient reports no self-injurious behavior  Patient reports no violent behavior    Verbal deficits: None    Patient's response to intervention:  The patient's response to intervention is accepting.    Progress toward goals and other mental status changes:  The patient's progress toward goals is  mixed .    Diagnosis:     ICD-10-CM ICD-9-CM   1. Bipolar I disorder, most recent episode (or current) depressed  F31.30 296.50   2. Anxiety  F41.9 300.00   3. Attention deficit hyperactivity disorder (ADHD), predominantly inattentive type  F90.0 314.00   4. Partner relational problem  Z63.0 V61.10       Plan:  individual psychotherapy    Return to clinic: 1 week    Length of Service (minutes):  50

## 2024-09-19 ENCOUNTER — PATIENT MESSAGE (OUTPATIENT)
Dept: NEUROLOGY | Facility: CLINIC | Age: 29
End: 2024-09-19
Payer: COMMERCIAL

## 2024-09-19 DIAGNOSIS — G43.719 INTRACTABLE CHRONIC MIGRAINE WITHOUT AURA AND WITHOUT STATUS MIGRAINOSUS: Primary | ICD-10-CM

## 2024-09-20 ENCOUNTER — TELEPHONE (OUTPATIENT)
Dept: NEUROLOGY | Facility: CLINIC | Age: 29
End: 2024-09-20
Payer: COMMERCIAL

## 2024-09-20 RX ORDER — BUTALBITAL, ACETAMINOPHEN AND CAFFEINE 50; 325; 40 MG/1; MG/1; MG/1
TABLET ORAL
Qty: 10 TABLET | Refills: 0 | Status: SHIPPED | OUTPATIENT
Start: 2024-09-20

## 2024-09-20 NOTE — TELEPHONE ENCOUNTER
----- Message from Mica Ramos sent at 9/20/2024 10:04 AM CDT -----  Contact: Pharmacy  butalbital-acetaminophen-caffeine -40 mg (FIORICET, ESGIC) -40 mg per tablet    Type:  Pharmacy Calling to Clarify an RX    Name of Caller:Pharmacy     Pharmacy Name:  [facility], [place] [id number] [place] [address]  [address] [address] [address]  Phone: [phone] Fax: [phone]      Prescription Name:butalbital-acetaminophen-caffeine -40 mg (FIORICET, ESGIC) -40 mg per tablet    What do they need to clarify?:MARCIE # is missing from script     Best Call Back Number:See above     Additional Information: Please advise

## 2024-09-23 ENCOUNTER — TELEPHONE (OUTPATIENT)
Dept: PSYCHIATRY | Facility: CLINIC | Age: 29
End: 2024-09-23
Payer: COMMERCIAL

## 2024-09-25 ENCOUNTER — OFFICE VISIT (OUTPATIENT)
Dept: PSYCHIATRY | Facility: CLINIC | Age: 29
End: 2024-09-25
Payer: COMMERCIAL

## 2024-09-25 ENCOUNTER — PATIENT MESSAGE (OUTPATIENT)
Dept: PSYCHIATRY | Facility: CLINIC | Age: 29
End: 2024-09-25
Payer: COMMERCIAL

## 2024-09-25 DIAGNOSIS — F41.9 ANXIETY: ICD-10-CM

## 2024-09-25 DIAGNOSIS — F90.0 ATTENTION DEFICIT HYPERACTIVITY DISORDER (ADHD), PREDOMINANTLY INATTENTIVE TYPE: ICD-10-CM

## 2024-09-25 DIAGNOSIS — F31.30 BIPOLAR I DISORDER, MOST RECENT EPISODE (OR CURRENT) DEPRESSED: Primary | ICD-10-CM

## 2024-09-25 DIAGNOSIS — Z63.0 PARTNER RELATIONAL PROBLEM: ICD-10-CM

## 2024-09-25 PROCEDURE — 90834 PSYTX W PT 45 MINUTES: CPT | Mod: 95,,, | Performed by: SOCIAL WORKER

## 2024-09-25 SDOH — SOCIAL DETERMINANTS OF HEALTH (SDOH): PROBLEMS IN RELATIONSHIP WITH SPOUSE OR PARTNER: Z63.0

## 2024-09-25 NOTE — PROGRESS NOTES
"  Individual Psychotherapy (PhD/LCSW)    9/25/2024    Site:  [facility]        --Via virtual visit with synchronous audio and video.  Patient presented at home, in the [facility].   Each patient to whom medical services by telemedicine is provided is:  (1) informed of the relationship between the physician and patient and the respective role of any other health care provider with respect to management of the patient; and (2) notified that he or she may decline to receive medical services by telemedicine and may withdraw from such care at any time.      Therapeutic Intervention: Met with patient.  Outpatient - Insight oriented psychotherapy 45 min - CPT code 21742, Outpatient - Supportive psychotherapy 45 min - CPT Code 81981, and Outpatient - Interactive psychotherapy 45 min - CPT code 48113    Chief complaint/reason for encounter: attention deficit, mood swings, anxiety, and interpersonal     Interval history and content of current session: 29 year old female returned via virtual visit from home. Patient commenting that her life-long issue with migraine headaches is ever-present but more intense than usual about the past week. Distressed by there being virtually no "pregnancy-safe" medications during pregnancy. She reported on overall pain. Stated she had not read or completed the weekly action plan but had been reading some fiction she was excited about.  Reports she is to have gender reveal for the baby this Sunday.  Discussed with the patient questions concerns her obstacles to completing the action plan. Patient had reported past difficult reactions to the experience of therapists trying to teach her relaxation techniques, and she admitted to a personal sense that relaxation is difficult, that is it not for her. We discussed one by one examples of activities related in some way to relaxation which she has done before and felt gratified by. This included fitness exercise activities she endorsed loving, " "stretching exercises as a part of that, and of visualization of success in challenging or competitive endeavors.    1) progressive muscle relaxation ((PMR)) ), 2) Deep breathing, and 3) Imagery.   Patient agreed to complete the existing action plan before next week's session, now incorporating ideas from our discussesion.     [facility], Session 6 (Treatment Initiation)  Session Focus:  Brief check-in  Set agenda  Collect rating scales: deferred--patient to later send in messaging.  Treatment Plan/Goals  Review Values and Aspirations  Intervention techniques (if able):   Address patient concerns  Summarize session  Feedback about session    Action Plan:  Review therapy materials  Intervention practice: Cognitive restructuring regarding mental blocks to action plan.  Ways to overcome obstacles: practice of self-talk challenging preconceptions of obstacles to goal.      09/18/2024    Overall Treatment Plan: _____   Values and Aspirations: _____    Problem List / Patients Goals and Evidence-Based Interventions (include up to 5):    Problem #1:   Goal: Better/more communication. Better/more intimacy.   Therapy Interventions: review values and aspirations.  Non-Therapy Strategies: Dates, love language assessment.    Problem #2: desire for self-improvement   Goal:  "Improve self image, find my confidence that got beat down over the years."    Therapy Interventions: Identify unhelpful thinking patterns, restructuring thoughts.  Non-Therapy Strategies: ___    Problem #3: Health concerns.   Goal: Manage thoughts and physical health  Therapy Interventions: Get medical treatment; stay active in/outside of therapy.   Non-Therapy Strategies: Dance and exercise.    Problem #4: Productivity  Goal: Get things done, maintain better focus and be motivated.   Therapy Interventions: Review values and aspirations  Non-Therapy Strategies: ___    Problem #5:  Water on me  Goal: Enjoying time in the water for fun Showering more " often during the week.    Therapy Interventions: Identify unhelpful thinking patterns, restructuring thoughts, exposure.    Non-Therapy Strategies: 20 minute shower weekly, one hour recreational swim with family /friends.        Treatment plan:  Target symptoms: distractability, recurrent depression, anxiety , mood swings, trauma history and marital discord  Why chosen therapy is appropriate versus another modality: relevant to diagnosis, patient responds to this modality  Outcome monitoring methods: self-report, observation, checklist/rating scale  Therapeutic intervention type: insight oriented psychotherapy, interactive psychotherapy, psycho-education    Risk parameters:  Patient reports no suicidal ideation  Patient reports no homicidal ideation  Patient reports no self-injurious behavior  Patient reports no violent behavior    Verbal deficits: None    Patient's response to intervention:  The patient's response to intervention is accepting.    Progress toward goals and other mental status changes:  The patient's progress toward goals is  mixed .    Diagnosis:     ICD-10-CM ICD-9-CM   1. Bipolar I disorder, most recent episode (or current) depressed  F31.30 296.50   2. Anxiety  F41.9 300.00   3. Attention deficit hyperactivity disorder (ADHD), predominantly inattentive type  F90.0 314.00   4. Partner relational problem  Z63.0 V61.10       Plan:  individual psychotherapy    Return to clinic: 1 week    Length of Service (minutes):  50

## 2024-09-26 ENCOUNTER — LAB VISIT (OUTPATIENT)
Dept: LAB | Facility: HOSPITAL | Age: 29
End: 2024-09-26
Attending: ADVANCED PRACTICE MIDWIFE
Payer: COMMERCIAL

## 2024-09-26 ENCOUNTER — OFFICE VISIT (OUTPATIENT)
Dept: OBSTETRICS AND GYNECOLOGY | Facility: CLINIC | Age: 29
End: 2024-09-26
Payer: COMMERCIAL

## 2024-09-26 VITALS
WEIGHT: 105.19 LBS | DIASTOLIC BLOOD PRESSURE: 66 MMHG | SYSTOLIC BLOOD PRESSURE: 112 MMHG | BODY MASS INDEX: 19.86 KG/M2 | HEIGHT: 61 IN

## 2024-09-26 DIAGNOSIS — Z32.01 POSITIVE PREGNANCY TEST: ICD-10-CM

## 2024-09-26 DIAGNOSIS — Z32.01 POSITIVE PREGNANCY TEST: Primary | ICD-10-CM

## 2024-09-26 DIAGNOSIS — G43.719 INTRACTABLE CHRONIC MIGRAINE WITHOUT AURA AND WITHOUT STATUS MIGRAINOSUS: ICD-10-CM

## 2024-09-26 PROCEDURE — 87491 CHLMYD TRACH DNA AMP PROBE: CPT | Performed by: ADVANCED PRACTICE MIDWIFE

## 2024-09-26 PROCEDURE — 85025 COMPLETE CBC W/AUTO DIFF WBC: CPT | Performed by: ADVANCED PRACTICE MIDWIFE

## 2024-09-26 PROCEDURE — 99999 PR PBB SHADOW E&M-EST. PATIENT-LVL III: CPT | Mod: PBBFAC,,, | Performed by: ADVANCED PRACTICE MIDWIFE

## 2024-09-26 PROCEDURE — 86762 RUBELLA ANTIBODY: CPT | Performed by: ADVANCED PRACTICE MIDWIFE

## 2024-09-26 PROCEDURE — 86850 RBC ANTIBODY SCREEN: CPT | Performed by: ADVANCED PRACTICE MIDWIFE

## 2024-09-26 PROCEDURE — 83020 HEMOGLOBIN ELECTROPHORESIS: CPT | Performed by: ADVANCED PRACTICE MIDWIFE

## 2024-09-26 PROCEDURE — 84702 CHORIONIC GONADOTROPIN TEST: CPT | Performed by: ADVANCED PRACTICE MIDWIFE

## 2024-09-26 PROCEDURE — 87086 URINE CULTURE/COLONY COUNT: CPT | Performed by: ADVANCED PRACTICE MIDWIFE

## 2024-09-26 PROCEDURE — 86900 BLOOD TYPING SEROLOGIC ABO: CPT | Performed by: ADVANCED PRACTICE MIDWIFE

## 2024-09-26 PROCEDURE — 88175 CYTOPATH C/V AUTO FLUID REDO: CPT | Performed by: ADVANCED PRACTICE MIDWIFE

## 2024-09-26 PROCEDURE — 87389 HIV-1 AG W/HIV-1&-2 AB AG IA: CPT | Performed by: ADVANCED PRACTICE MIDWIFE

## 2024-09-26 PROCEDURE — 86593 SYPHILIS TEST NON-TREP QUANT: CPT | Performed by: ADVANCED PRACTICE MIDWIFE

## 2024-09-26 PROCEDURE — 87591 N.GONORRHOEAE DNA AMP PROB: CPT | Performed by: ADVANCED PRACTICE MIDWIFE

## 2024-09-26 PROCEDURE — 86901 BLOOD TYPING SEROLOGIC RH(D): CPT | Performed by: ADVANCED PRACTICE MIDWIFE

## 2024-09-26 PROCEDURE — 36415 COLL VENOUS BLD VENIPUNCTURE: CPT | Mod: PO | Performed by: ADVANCED PRACTICE MIDWIFE

## 2024-09-26 PROCEDURE — 80074 ACUTE HEPATITIS PANEL: CPT | Performed by: ADVANCED PRACTICE MIDWIFE

## 2024-09-27 LAB
ABO + RH BLD: NORMAL
BACTERIA UR CULT: NORMAL
BACTERIA UR CULT: NORMAL
BASOPHILS # BLD AUTO: 0.04 K/UL (ref 0–0.2)
BASOPHILS NFR BLD: 0.5 % (ref 0–1.9)
BLD GP AB SCN CELLS X3 SERPL QL: NORMAL
DIFFERENTIAL METHOD BLD: ABNORMAL
EOSINOPHIL # BLD AUTO: 0.1 K/UL (ref 0–0.5)
EOSINOPHIL NFR BLD: 0.7 % (ref 0–8)
ERYTHROCYTE [DISTWIDTH] IN BLOOD BY AUTOMATED COUNT: 12.2 % (ref 11.5–14.5)
HAV IGM SERPL QL IA: NORMAL
HBV CORE IGM SERPL QL IA: NORMAL
HBV SURFACE AG SERPL QL IA: NORMAL
HCG INTACT+B SERPL-ACNC: NORMAL MIU/ML
HCT VFR BLD AUTO: 36.9 % (ref 37–48.5)
HCV AB SERPL QL IA: NORMAL
HGB A2 MFR BLD HPLC: 2.6 % (ref 2.2–3.2)
HGB BLD-MCNC: 11.7 G/DL (ref 12–16)
HGB FRACT BLD ELPH-IMP: NORMAL
HGB FRACT BLD ELPH-IMP: NORMAL
HIV 1+2 AB+HIV1 P24 AG SERPL QL IA: NORMAL
IMM GRANULOCYTES # BLD AUTO: 0.01 K/UL (ref 0–0.04)
IMM GRANULOCYTES NFR BLD AUTO: 0.1 % (ref 0–0.5)
LYMPHOCYTES # BLD AUTO: 2.3 K/UL (ref 1–4.8)
LYMPHOCYTES NFR BLD: 31.5 % (ref 18–48)
MCH RBC QN AUTO: 30 PG (ref 27–31)
MCHC RBC AUTO-ENTMCNC: 31.7 G/DL (ref 32–36)
MCV RBC AUTO: 95 FL (ref 82–98)
MONOCYTES # BLD AUTO: 0.4 K/UL (ref 0.3–1)
MONOCYTES NFR BLD: 5.9 % (ref 4–15)
NEUTROPHILS # BLD AUTO: 4.5 K/UL (ref 1.8–7.7)
NEUTROPHILS NFR BLD: 61.3 % (ref 38–73)
NRBC BLD-RTO: 0 /100 WBC
PLATELET # BLD AUTO: 308 K/UL (ref 150–450)
PMV BLD AUTO: 10.5 FL (ref 9.2–12.9)
RBC # BLD AUTO: 3.9 M/UL (ref 4–5.4)
RUBV IGG SER-ACNC: 57.3 IU/ML
RUBV IGG SER-IMP: REACTIVE
SPECIMEN OUTDATE: NORMAL
TREPONEMA PALLIDUM IGG+IGM AB [PRESENCE] IN SERUM OR PLASMA BY IMMUNOASSAY: NONREACTIVE
WBC # BLD AUTO: 7.31 K/UL (ref 3.9–12.7)

## 2024-09-29 LAB
C TRACH DNA SPEC QL NAA+PROBE: NOT DETECTED
N GONORRHOEA DNA SPEC QL NAA+PROBE: NOT DETECTED

## 2024-09-30 ENCOUNTER — TELEPHONE (OUTPATIENT)
Dept: PSYCHIATRY | Facility: CLINIC | Age: 29
End: 2024-09-30
Payer: COMMERCIAL

## 2024-09-30 NOTE — PROGRESS NOTES
CHIEF COMPLAINT:   Patient presents with      Possible Pregnancy        HISTORY OF PRESENT ILLNESS  Iris Johnson 29 y.o.  presents for pregnancy risk assessment.   The patient has no complaints today.  No nausea or vomiting. No bleeding or pain.  Pregnancy was not planned. Pt has looked into options of .  Partner is supportive of pregnancy.  Lives at home with son and partner.  Denies domestic abuse.  Denies chemical/pesticide/radiation exposure. Pt reports that she was doing botox and ajovy for her migraines. Fioricet does not help. Stopped Ajovy and botox is having denials from insurance. Was on botox for last pregnancy and did them in second and third trimesters and it was the only thing that helped. Pt is very worried about increase in migraines with this pregnancy just like last.   OB history:      LMP: Patient's last menstrual period was 07/15/2024 (approximate).  LAURA: 25  EGA: 10w3d      Health Maintenance   Topic Date Due    Lipid Panel  Never done    TETANUS VACCINE  2033    Hepatitis C Screening  Completed       Past Medical History:   Diagnosis Date    ADHD (attention deficit hyperactivity disorder)     Anemia     ASCUS of cervix with negative high risk HPV 2023    Bipolar disorder     Chicken pox     childhood    Headache     Heart palpitations     Hypotension     Insomnia     Migraine     Obstetrical laceration, second degree 2023    Postpartum depression     Substance abuse     prior use, no current use    Syncope 2009    admitted x 1    Syncope        Past Surgical History:   Procedure Laterality Date    dental extraction ()      RHINOPLASTY  2010    TONSILLECTOMY         Family History   Problem Relation Name Age of Onset    Hypertension Paternal Grandfather Cheriton     Hyperlipidemia Paternal Grandfather Win     Heart disease Paternal Grandfather Win     Heart block Paternal Grandfather Cheriton     Cancer Paternal Grandfather Win     Hypertension  Paternal Grandmother Mony     Hyperlipidemia Paternal Grandmother Mony     Breast cancer Paternal Grandmother Mony 55    Asthma Paternal Grandmother Mony     Depression Paternal Grandmother Mony     Arthritis Paternal Grandmother Mony     Cancer Paternal Grandmother Mony     Stroke Paternal Grandmother Mony     Hyperlipidemia Father      Breast cancer Mother Esther     Skin cancer Mother Esther     Bipolar disorder Mother Esther     Cervical cancer Mother Esther     Cancer Mother Esther     Mental illness Mother Esther     Ovarian cancer Neg Hx      Colon cancer Neg Hx         Social History     Socioeconomic History    Marital status:     Number of children: 1   Occupational History    Occupation: construction     Comment: history of waitressing   Tobacco Use    Smoking status: Former     Types: Vaping with nicotine     Passive exposure: Past    Smokeless tobacco: Never   Substance and Sexual Activity    Alcohol use: Not Currently     Comment: 1-2 drinks lper week    Drug use: Not Currently     Types: Amphetamines, Marijuana     Comment: narcotics, ecstacy (all prior use)    Sexual activity: Not Currently     Partners: Male     Birth control/protection: None     Comment: birth control pill     Social Drivers of Health     Financial Resource Strain: High Risk (5/27/2024)    Overall Financial Resource Strain (CARDIA)     Difficulty of Paying Living Expenses: Hard   Food Insecurity: No Food Insecurity (5/27/2024)    Hunger Vital Sign     Worried About Running Out of Food in the Last Year: Never true     Ran Out of Food in the Last Year: Never true   Physical Activity: Sufficiently Active (5/27/2024)    Exercise Vital Sign     Days of Exercise per Week: 3 days     Minutes of Exercise per Session: 150+ min   Stress: Stress Concern Present (5/27/2024)    [employer] [place] [employer] - Occupational Stress Questionnaire     Feeling of Stress : Rather much   Housing Stability: Unknown (5/27/2024)     Housing Stability Vital Sign     Unable to Pay for Housing in the Last Year: No       Current Outpatient Medications   Medication Sig Dispense Refill    butalbital-acetaminophen-caffeine -40 mg (FIORICET, ESGIC) -40 mg per tablet 1 tab PO PRN migraine. No more than 10 tab per month. 10 tablet 0    lamoTRIgine (LAMICTAL) 200 MG tablet Take 1 tablet (200 mg total) by mouth every morning. 30 tablet 5    PNV,calcium 72-iron,carb-folic (PRENATAL PLUS) 29 mg iron- 1 mg Tab Take 1 tablet by mouth once daily. 90 tablet 1    QUEtiapine (SEROQUEL) 200 MG Tab Take 1 tablet (200 mg total) by mouth every evening. 30 tablet 5     Current Facility-Administered Medications   Medication Dose Route Frequency Provider Last Rate Last Admin    onabotulinumtoxina injection 200 Units  200 Units Intramuscular q12 weeks Deb Aguirre, NP   200 Units at 08/31/23 1516    onabotulinumtoxina injection 200 Units  200 Units Intramuscular q12 weeks Deb Aguirre, NP   200 Units at 05/13/24 1425       Review of patient's allergies indicates:   Allergen Reactions    Bactrim [sulfamethoxazole-trimethoprim]     Sulfa (sulfonamide antibiotics)          PHYSICAL EXAM   Vitals:    09/26/24 1032   BP: 112/66        PAIN SCALE: 0/10 None    PHYSICAL EXAM    ROS:  GENERAL: No fever, chills, fatigability or weight loss.  CV: Denies chest pain  PULM: Denies shortness of breath or wheezing.  ABDOMEN: Appetite fine. No weight loss. Denies diarrhea, abdominal pain, hematemesis or blood in stool.  URINARY: No flank pain, dysuria or hematuria.  REPRODUCTIVE: No abnormal vaginal bleeding.       PE:   APPEARANCE: Well nourished, well developed, in no acute distress  CHEST: Clear to auscultation bilaterally  CV: Regular rate and rhythm  ABDOMEN: Soft. No tenderness or masses. No hepatosplenomegaly. No hernias  PELVIC:   VULVA: No lesions. Normal female genitalia.  URETHRAL MEATUS: Normal size and location, no lesions, no prolapse.  URETHRA: No  masses, tenderness, prolapse or scarring.  VAGINA: Moist and well rugated, no discharge, no significant cystocele or rectocele.  CERVIX: No lesions, normal diameter, no stenosis, no cervical motion tenderness.     UPT +    A/P:     -      Patient was counseled today on A.C.S. Pap guidelines and recommendations for yearly pelvic exams, mammograms and monthly self breast exams; to see her PCP for other health maintenance and pregnancy.   -      Patient's medications and medical history reviewed with patient and implications in pregnancy.   -      Pregnancy course discussed and 'AtoZ' book given. Patient was counseled on proper weight gain based on the [place] [facility]'s recommendations based on her pre-pregnancy weight. Discussed foods to avoid in pregnancy (i.e. sushi, fish that are high in mercury, deli meat, and unpasteurized cheeses). Discussed prenatal vitamin options (i.e. stool softener, DHA). Discussed potential medical problems in pregnancy.  -     Discussed risk of Toxoplasmosis transmission from pets and reviewed risk reduction techniques.  -     Chromosomal abnormality risk discussed and available testing offered - wants after confirmation of dates  -     Pt was counseled on exercise in pregnancy and weight gain recommendations.  -     Familiar with the practice including CNM collaboration.   -     Follow-up initial OB with u/s.   -     Labs today   -     Pap smear and genprobe collected today   -     Remains on seroquel and lamictal with pregnancy, taking prenatal vitamins with extra folate

## 2024-10-01 ENCOUNTER — TELEPHONE (OUTPATIENT)
Dept: PSYCHIATRY | Facility: CLINIC | Age: 29
End: 2024-10-01
Payer: COMMERCIAL

## 2024-10-02 ENCOUNTER — PATIENT MESSAGE (OUTPATIENT)
Dept: PSYCHIATRY | Facility: CLINIC | Age: 29
End: 2024-10-02
Payer: COMMERCIAL

## 2024-10-02 ENCOUNTER — OFFICE VISIT (OUTPATIENT)
Dept: PSYCHIATRY | Facility: CLINIC | Age: 29
End: 2024-10-02
Payer: COMMERCIAL

## 2024-10-02 DIAGNOSIS — F31.30 BIPOLAR I DISORDER, MOST RECENT EPISODE (OR CURRENT) DEPRESSED: Primary | ICD-10-CM

## 2024-10-02 DIAGNOSIS — F90.0 ATTENTION DEFICIT HYPERACTIVITY DISORDER (ADHD), PREDOMINANTLY INATTENTIVE TYPE: ICD-10-CM

## 2024-10-02 DIAGNOSIS — F41.9 ANXIETY: ICD-10-CM

## 2024-10-02 PROCEDURE — 90834 PSYTX W PT 45 MINUTES: CPT | Mod: 95,,, | Performed by: SOCIAL WORKER

## 2024-10-02 NOTE — PROGRESS NOTES
Individual Psychotherapy (PhD/LCSW)    10/2/2024    Site:  [place]        --Via virtual visit with synchronous audio and video.  Patient presented at home, in the state [facility].   Each patient to whom medical services by telemedicine is provided is:  (1) informed of the relationship between the physician and patient and the respective role of any other health care provider with respect to management of the patient; and (2) notified that he or she may decline to receive medical services by telemedicine and may withdraw from such care at any time.      Therapeutic Intervention: Met with patient.  Outpatient - Insight oriented psychotherapy 45 min - CPT code 62357, Outpatient - Supportive psychotherapy 45 min - CPT Code 51685, and Outpatient - Interactive psychotherapy 45 min - CPT code 43023    Chief complaint/reason for encounter: attention deficit, mood swings, anxiety, and interpersonal     Interval history and content of current session: 29 year old female returned for follow up psycho-education STeP session, via virtual visit from home, where she was overseeing her toddler child. Patient reported struggle with head and jaw pain and overall health condition; said she is not allowed most of her medication, due to her pregnancy, and that is impacting her physical discomfort ability to focus. Stated she looked over her assigned reading in the previous week's Action Plan, but she did not feel she absorbed the content well. Spent time with patient, reviewing and elaborating on the concepts. Patient agreeing to complete the rest of the reading, which was to represent basic concepts we discussed this session.       [facility], Session 9 (Exposure therapy, part 1)  Session Focus:  Brief check-in  Set agenda  Collect rating scales: deferred--patient to later send in messaging.  PHQ-9:  16; NOLVIA-7:  19  Treatment Plan/Goals  Review Values and Aspirations  Intervention techniques (if able):   Address patient  "concerns  Summarize session  Feedback about session    Action Plan:  Review therapy materials  Intervention practice: Cognitive restructuring regarding mental blocks to action plan.  Ways to overcome obstacles: practice of self-talk challenging preconceptions of obstacles to goal.      09/18/2024    Overall Treatment Plan: _____   Values and Aspirations: _____    Problem List / Patients Goals and Evidence-Based Interventions (include up to 5):    Problem #1:   Goal: Better/more communication. Better/more intimacy.   Therapy Interventions: review values and aspirations.  Non-Therapy Strategies: Dates, love language assessment.    Problem #2: desire for self-improvement   Goal:  "Improve self image, find my confidence that got beat down over the years."    Therapy Interventions: Identify unhelpful thinking patterns, restructuring thoughts.  Non-Therapy Strategies: ___    Problem #3: Health concerns.   Goal: Manage thoughts and physical health  Therapy Interventions: Get medical treatment; stay active in/outside of therapy.   Non-Therapy Strategies: Dance and exercise.    Problem #4: Productivity  Goal: Get things done, maintain better focus and be motivated.   Therapy Interventions: Review values and aspirations  Non-Therapy Strategies: ___    Problem #5:  Water on me  Goal: Enjoying time in the water for fun Showering more often during the week.    Therapy Interventions: Identify unhelpful thinking patterns, restructuring thoughts, exposure.    Non-Therapy Strategies: 20 minute shower weekly, one hour recreational swim with family /friends.        Treatment plan:  Target symptoms: distractability, recurrent depression, anxiety , mood swings, trauma history and marital discord  Why chosen therapy is appropriate versus another modality: relevant to diagnosis, patient responds to this modality  Outcome monitoring methods: self-report, observation, checklist/rating scale  Therapeutic intervention type: insight " oriented psychotherapy, interactive psychotherapy, psycho-education    Risk parameters:  Patient reports no suicidal ideation  Patient reports no homicidal ideation  Patient reports no self-injurious behavior  Patient reports no violent behavior    Verbal deficits: None    Patient's response to intervention:  The patient's response to intervention is accepting.    Progress toward goals and other mental status changes:  The patient's progress toward goals is  mixed .    Diagnosis:   No diagnosis found.      Plan:  individual psychotherapy    Return to clinic: 1 week    Length of Service (minutes):  50

## 2024-10-03 ENCOUNTER — PROCEDURE VISIT (OUTPATIENT)
Dept: OBSTETRICS AND GYNECOLOGY | Facility: CLINIC | Age: 29
End: 2024-10-03
Payer: COMMERCIAL

## 2024-10-03 ENCOUNTER — OFFICE VISIT (OUTPATIENT)
Dept: PSYCHIATRY | Facility: CLINIC | Age: 29
End: 2024-10-03
Payer: COMMERCIAL

## 2024-10-03 ENCOUNTER — PATIENT MESSAGE (OUTPATIENT)
Dept: NEUROLOGY | Facility: CLINIC | Age: 29
End: 2024-10-03
Payer: COMMERCIAL

## 2024-10-03 DIAGNOSIS — O99.341 BIPOLAR DISEASE DURING PREGNANCY IN FIRST TRIMESTER: Primary | ICD-10-CM

## 2024-10-03 DIAGNOSIS — Z32.01 POSITIVE PREGNANCY TEST: ICD-10-CM

## 2024-10-03 DIAGNOSIS — F90.0 ATTENTION DEFICIT HYPERACTIVITY DISORDER (ADHD), PREDOMINANTLY INATTENTIVE TYPE: ICD-10-CM

## 2024-10-03 DIAGNOSIS — F31.9 BIPOLAR DISEASE DURING PREGNANCY IN FIRST TRIMESTER: Primary | ICD-10-CM

## 2024-10-03 PROCEDURE — 76801 OB US < 14 WKS SINGLE FETUS: CPT | Mod: S$GLB,,, | Performed by: OBSTETRICS & GYNECOLOGY

## 2024-10-03 RX ORDER — LAMOTRIGINE 200 MG/1
200 TABLET ORAL EVERY MORNING
Qty: 30 TABLET | Refills: 3 | Status: SHIPPED | OUTPATIENT
Start: 2024-10-03 | End: 2025-01-31

## 2024-10-03 NOTE — PROGRESS NOTES
Outpatient Psychiatry Follow-Up Visit     10/3/2024      Virtual Visit    The patient location is: Patient's home/ Patient reported that his/her location at the time of this visit was in the [facility]     Visit type: Virtual visit with synchronous audio and video     Each patient to whom he or she provides medical services by telehealth is: (1) informed of the relationship between the medical psychologist and patient and the respective role of any other health care provider with respect to management of the patient; and (2) notified that he or she may decline to receive medical services by telehealth and may withdraw from such care at any time.    I also informed patient of the following:   Tiffanie Galvez, PhD, MPAP:  [place] medical license number: [id number]    My contact info:  [facility] / 2nd Floor  [address]  [place] [address]   Ph: [phone]    If technology issues, call office phone: Ph: [phone] or [phone]  If crisis: Dial 911 or go to nearest Emergency Room (ER)  If questions related to privacy practices: contact [facility] Information Department: [phone]    Clinical Status of Patient:  Outpatient (Ambulatory)    Chief Complaint:  Iris Johnson is a 29 y.o. female who presents today for follow-up of mood and inattention/distractibility .      Preferred Name: Iris  Gender Identity: cis female  Preferred Pronouns: she/her    LAST VISIT: Iris attended her visit. She wants therapy--discussed the step program and will put in a referral. She brought up instances in her past--many problems with her step-mom. She blames them for taking her son. She still has not changed her name. She is struggling with changing it because she wants the same last name as her first son, Mat.     She reported that she had the increased Seroquel but then starting getting the 200 mg--reported is not sleeping. She reported that she sweats a lot at  night--encouraged her to schedule with GYN to follow-up.    Plan--continue Lamictal 200 mg; Seroquel 300 mg hs; trial of Mydayis 50 mg (sent 1 script); Message me about stimulant--will need 2 additional scripts; consider couples counseling    CURRENT PRESENTATION: Iris attended her virtual visit. She has an ultrasound today and hopes that they will learn how far along she is. She is having a boy. She really did not want to be pregnant and may have considered  if she had the option without having to travel. Because of the pregnancy, she is not on any stimulants or migraine medicines. She was tearful when discussing this--and angry. She has not been sleeping well--baby has not been feeling well. She has started STeP therapy--will likely continue. She is hurting (migraine) and is angry about the whole situation.    Plan--continue Lamictal 200 mg; Seroquel 200 mg hs;     Katherine (son) 2 y/o ()  Mat (older son) will be 10 in May--he lives with her dad and step-mom.  Roddy ()     from 2024.        Prior medicines: Ambien, Lamictal, Vyvanse, clonazepam, Adderall  ____________________________________________________________          10/3/2024     9:52 AM 4/15/2024     9:54 AM 1/3/2024     8:54 AM   GAD7   1. Feeling nervous, anxious, or on edge? 3  1  1    2. Not being able to stop or control worrying? 2  1  1    3. Worrying too much about different things? 2  1  1    4. Trouble relaxing? 3  1  1    5. Being so restless that it is hard to sit still? 3  1  1    6. Becoming easily annoyed or irritable? 3  1  1    7. Feeling afraid as if something awful might happen? 2  1  1    8. If you checked off any problems, how difficult have these problems made it for you to do your work, take care of things at home, or get along with other people? 2      NOLVIA-7 Score 18  7 7       Patient-reported      0-4 = Minimal anxiety  5-9 = Mild anxiety  10-14 = Moderate anxiety  15-21 = Severe anxiety          8/1/2022     9:05 AM   Depression Patient Health Questionnaire   Over the last two weeks how often have you been bothered by little interest or pleasure in doing things Several days   Over the last two weeks how often have you been bothered by feeling down, depressed or hopeless Several days   PHQ-2 Total Score 2     0-4 = No intervention  5 to 9 = Mild  10 to 14 = Moderate  15 to 19 = Moderately severe  =20 = Severe    Review of Systems   PSYCHIATRIC: Pertinant items are noted in the narrative.    Past Medical, Family and Social History: The patient's past medical, family and social history have been reviewed and updated as appropriate within the electronic medical record - see encounter notes.      Current Outpatient Medications:     butalbital-acetaminophen-caffeine -40 mg (FIORICET, ESGIC) -40 mg per tablet, 1 tab PO PRN migraine. No more than 10 tab per month., Disp: 10 tablet, Rfl: 0    lamoTRIgine (LAMICTAL) 200 MG tablet, Take 1 tablet (200 mg total) by mouth every morning., Disp: 30 tablet, Rfl: 3    PNV,calcium 72-iron,carb-folic (PRENATAL PLUS) 29 mg iron- 1 mg Tab, Take 1 tablet by mouth once daily., Disp: 90 tablet, Rfl: 1    QUEtiapine (SEROQUEL) 200 MG Tab, Take 1 tablet (200 mg total) by mouth every evening., Disp: 30 tablet, Rfl: 5    Current Facility-Administered Medications:     onabotulinumtoxina injection 200 Units, 200 Units, Intramuscular, q12 weeks, Deb Aguirre, NP, 200 Units at 08/31/23 1516    onabotulinumtoxina injection 200 Units, 200 Units, Intramuscular, q12 weeks, Deb Aguirre, NP, 200 Units at 05/13/24 1425    Compliance: yes    Side effects: see above    Risk Parameters:  Patient reports no suicidal ideation  Patient reports no homicidal ideation  Patient reports no self-injurious behavior  Patient reports no violent behavior    Exam (detailed: at least 9 elements; comprehensive: all 15 elements)   Constitutional  Vitals:  Most recent vital signs were  "reviewed.   Last 3 sets of Vitals        7/3/2024     9:08 AM 9/9/2024    11:09 AM 9/26/2024    10:32 AM   Vitals - 1 value per visit   SYSTOLIC 110 94 112   DIASTOLIC 73 68 66   Pulse 90 114    Temp  97.3 °F (36.3 °C)    SPO2  99 %    Weight (lb) 105.82 107.81 105.16   Weight (kg) 48 48.9 47.7   Height  5' 1" (1.549 m) 5' 1" (1.549 m)   BMI (Calculated)  20.4 19.9   Pain Score   Zero          General:  age appropriate, casually dressed, neatly groomed, tinted glasses, hair pulled back     Musculoskeletal  Muscle Strength/Tone:  no tremor, no tic   Gait & Station:  video visit     Psychiatric  Speech:  no latency; no press   Behavior: wnl   Mood & Affect:  Depressed, irritable?  congruent and appropriate   Thought Process:  normal and logical   Associations:  intact   Thought Content:  normal, no suicidality, no homicidality, delusions, or paranoia   Insight:  has awareness of illness   Judgement: behavior is adequate to circumstances   Orientation:  grossly intact   Memory: intact for content of interview   Language: grossly intact   Attention Span & Concentration:  Grossly intact   Fund of Knowledge:  intact and appropriate to age and level of education     Assessment and Diagnosis   Status/Progress: Based on the examination today, the patient's problem(s) is/are adequately but not ideally controlled.  New problems have been presented today.   Co-morbidities and psychosocial stressors  are complicating management of the primary condition.  There are no active rule-out diagnoses for this patient at this time.     General Impression:     Encounter Diagnoses   Name Primary?    Bipolar disease during pregnancy in first trimester Yes    Attention deficit hyperactivity disorder (ADHD), predominantly inattentive type        Intervention/Counseling/Treatment Plan   Medication Management: Discussed risks, benefits, and alternatives to treatment plan documented above with patient. I answered all patient questions related to " this plan, and patient expressed understanding and agreement.   continue Lamictal 200 mg; Seroquel 200 mg hs  Continue STEP therapy      Return to Clinic:  4 months    Medication List with Changes/Refills   Current Medications    BUTALBITAL-ACETAMINOPHEN-CAFFEINE -40 MG (FIORICET, ESGIC) -40 MG PER TABLET    1 tab PO PRN migraine. No more than 10 tab per month.    PNV,CALCIUM 72-IRON,CARB-FOLIC (PRENATAL PLUS) 29 MG IRON- 1 MG TAB    Take 1 tablet by mouth once daily.    QUETIAPINE (SEROQUEL) 200 MG TAB    Take 1 tablet (200 mg total) by mouth every evening.   Changed and/or Refilled Medications    Modified Medication Previous Medication    LAMOTRIGINE (LAMICTAL) 200 MG TABLET lamoTRIgine (LAMICTAL) 200 MG tablet       Take 1 tablet (200 mg total) by mouth every morning.    Take 1 tablet (200 mg total) by mouth every morning.       Time spent with pt including note preparation: 18 minutes     Tiffanie Galvez, PhD, MP  Advanced Practice Medical Psychologist  [facility]  [place] [address]  [phone] ph  [phone] fax

## 2024-10-03 NOTE — PATIENT INSTRUCTIONS

## 2024-10-05 LAB
FINAL PATHOLOGIC DIAGNOSIS: NORMAL
Lab: NORMAL

## 2024-10-07 ENCOUNTER — TELEPHONE (OUTPATIENT)
Dept: PSYCHIATRY | Facility: CLINIC | Age: 29
End: 2024-10-07
Payer: COMMERCIAL

## 2024-10-07 ENCOUNTER — PATIENT MESSAGE (OUTPATIENT)
Dept: PSYCHIATRY | Facility: CLINIC | Age: 29
End: 2024-10-07
Payer: COMMERCIAL

## 2024-10-08 ENCOUNTER — PATIENT MESSAGE (OUTPATIENT)
Dept: NEUROLOGY | Facility: CLINIC | Age: 29
End: 2024-10-08
Payer: COMMERCIAL

## 2024-10-09 ENCOUNTER — PATIENT MESSAGE (OUTPATIENT)
Dept: PSYCHIATRY | Facility: CLINIC | Age: 29
End: 2024-10-09

## 2024-10-09 ENCOUNTER — OFFICE VISIT (OUTPATIENT)
Dept: PSYCHIATRY | Facility: CLINIC | Age: 29
End: 2024-10-09
Payer: COMMERCIAL

## 2024-10-09 DIAGNOSIS — F90.0 ATTENTION DEFICIT HYPERACTIVITY DISORDER (ADHD), PREDOMINANTLY INATTENTIVE TYPE: ICD-10-CM

## 2024-10-09 DIAGNOSIS — F41.9 ANXIETY: ICD-10-CM

## 2024-10-09 DIAGNOSIS — O99.341 BIPOLAR DISEASE DURING PREGNANCY IN FIRST TRIMESTER: Primary | ICD-10-CM

## 2024-10-09 DIAGNOSIS — F31.9 BIPOLAR DISEASE DURING PREGNANCY IN FIRST TRIMESTER: Primary | ICD-10-CM

## 2024-10-09 DIAGNOSIS — Z63.0 PARTNER RELATIONAL PROBLEM: ICD-10-CM

## 2024-10-09 PROCEDURE — 90834 PSYTX W PT 45 MINUTES: CPT | Mod: 95,,, | Performed by: SOCIAL WORKER

## 2024-10-09 SDOH — SOCIAL DETERMINANTS OF HEALTH (SDOH): PROBLEMS IN RELATIONSHIP WITH SPOUSE OR PARTNER: Z63.0

## 2024-10-09 NOTE — PROGRESS NOTES
[facility]  Individual Psychotherapy (PhD/LCSW)    10/9/2024    Site:  [facility]       --Via virtual visit with synchronous audio and video.  Patient presented at home, in the [facility].   Each patient to whom medical services by telemedicine is provided is:  (1) informed of the relationship between the physician and patient and the respective role of any other health care provider with respect to management of the patient; and (2) notified that he or she may decline to receive medical services by telemedicine and may withdraw from such care at any time.      Therapeutic Intervention: Met with patient.  Outpatient - Insight oriented psychotherapy 45 min - CPT code 28074 and Outpatient - Supportive psychotherapy 45 min - CPT Code 35880    Chief complaint/reason for encounter: attention deficit, mood swings, anxiety, somatic, and interpersonal     Interval history and content of current session:  Met via virtual visit, patient presenting from home. This is her 10th overall session, 5th with the present provider. Patient at home due to her young child still being home sick. Patient reporting significant physical discomfort ongoing, because her normal pain meds are now off-limits, due to her pregnancy, estimated to be at 12 weeks. Was excited to get to see a ultrasound image of the baby.     [facility], Session 10 (Exposure therapy, part 1, continued)  Session Focus:  Brief check-in  Set agenda  Collect rating scales, PHQ-9: 19, NOLVIA-7: 17  Treatment Plan/Goals  Review Values and Aspirations  Intervention techniques (if able):   Address patient concerns  Summarize session  Feedback about session    Action Plan:  Review therapy materials  Intervention practice: Cognitive restructuring regarding mental blocks to action plan.  Ways to overcome obstacles: self-talk challenging preconceptions of obstacles to goal. Management of routine and self-care.          10/9/2024    Overall Treatment Plan: _____   Values  "and Aspirations: _____    Problem List / Patients Goals and Evidence-Based Interventions (include up to 5):    Problem #1:   Goal: Better/more communication. Better/more intimacy.   Therapy Interventions: review values and aspirations.  Non-Therapy Strategies: Dates, love language assessment.    Problem #2: desire for self-improvement   Goal:  "Improve self image, find my confidence that got beat down over the years."    Therapy Interventions: Identify unhelpful thinking patterns, restructuring thoughts.  Non-Therapy Strategies: ___    Problem #3: Health concerns.   Goal: Manage thoughts and physical health  Therapy Interventions: Get medical treatment; stay active in/outside of therapy.   Non-Therapy Strategies: Dance and exercise.    Problem #4: Productivity  Goal: Get things done, maintain better focus and be motivated.   Therapy Interventions: Review values and aspirations  Non-Therapy Strategies: ___    Problem #5:  Water on me  Goal: Enjoying time in the water for fun Showering more often during the week.    Therapy Interventions: Identify unhelpful thinking patterns, restructuring thoughts, exposure.    Non-Therapy Strategies: 20 minute shower weekly, one hour recreational swim with family /friends.        Treatment plan:  Target symptoms:  distractibility, recurrent depression, anxiety, mood swings, trauma history and marital discord  Why chosen therapy is appropriate versus another modality: relevant to diagnosis, patient responds to this modality  Outcome monitoring methods: self-report, observation, checklist/rating scale  Therapeutic intervention type: insight oriented psychotherapy, interactive psychotherapy    Risk parameters:  Patient reports no suicidal ideation  Patient reports no homicidal ideation  Patient reports no self-injurious behavior  Patient reports no violent behavior    Verbal deficits: None    Patient's response to intervention:  The patient's response to intervention is " accepting.    Progress toward goals and other mental status changes:  The patient's progress toward goals is  mixed .    Diagnosis:     ICD-10-CM ICD-9-CM   1. Bipolar disease during pregnancy in first trimester  O99.341 648.43    F31.9 296.80   2. Attention deficit hyperactivity disorder (ADHD), predominantly inattentive type  F90.0 314.00   3. Anxiety  F41.9 300.00   4. Partner relational problem  Z63.0 V61.10       Plan:  individual psychotherapy    Return to clinic: 1 week    Length of Service (minutes): 45

## 2024-10-14 ENCOUNTER — PATIENT MESSAGE (OUTPATIENT)
Dept: PSYCHIATRY | Facility: CLINIC | Age: 29
End: 2024-10-14
Payer: COMMERCIAL

## 2024-10-16 ENCOUNTER — INITIAL PRENATAL (OUTPATIENT)
Dept: OBSTETRICS AND GYNECOLOGY | Facility: CLINIC | Age: 29
End: 2024-10-16
Payer: COMMERCIAL

## 2024-10-16 VITALS
WEIGHT: 109.81 LBS | BODY MASS INDEX: 20.74 KG/M2 | SYSTOLIC BLOOD PRESSURE: 118 MMHG | DIASTOLIC BLOOD PRESSURE: 60 MMHG

## 2024-10-16 DIAGNOSIS — Z34.82 ENCOUNTER FOR SUPERVISION OF OTHER NORMAL PREGNANCY IN SECOND TRIMESTER: Primary | ICD-10-CM

## 2024-10-16 DIAGNOSIS — G43.719 INTRACTABLE CHRONIC MIGRAINE WITHOUT AURA AND WITHOUT STATUS MIGRAINOSUS: ICD-10-CM

## 2024-10-16 DIAGNOSIS — F31.9 BIPOLAR AFFECTIVE DISORDER, REMISSION STATUS UNSPECIFIED: ICD-10-CM

## 2024-10-16 PROCEDURE — 99999 PR PBB SHADOW E&M-EST. PATIENT-LVL III: CPT | Mod: PBBFAC,,, | Performed by: ADVANCED PRACTICE MIDWIFE

## 2024-10-16 RX ORDER — VITAMIN A, VITAMIN C, VITAMIN D, VITAMIN E, THIAMINE, RIBOFLAVIN, NIACIN, VITAMIN B6, FOLIC ACID, VITAMIN B12, CALCIUM, IRON, ZINC, COPPER 4000; 120; 400; 22; 1.84; 3; 20; 10; 1; 12; 200; 27; 25; 2 [IU]/1; MG/1; [IU]/1; [IU]/1; MG/1; MG/1; MG/1; MG/1; MG/1; UG/1; MG/1; MG/1; MG/1; MG/1
1 TABLET ORAL
COMMUNITY
Start: 2024-09-09

## 2024-10-21 ENCOUNTER — PATIENT MESSAGE (OUTPATIENT)
Dept: PSYCHIATRY | Facility: CLINIC | Age: 29
End: 2024-10-21
Payer: COMMERCIAL

## 2024-10-21 NOTE — PROGRESS NOTES
29 y.o. female  at 13w2d here for NOB exam  denies VB or cramping  Insurance has not officially approved botox for her migraines but she reports they are getting closer to approval and is awaiting the ability to get her injections  Reports that fioricet is not helping, aware of suggestion for magnesium   /60   Wt 49.8 kg (109 lb 12.6 oz)   LMP 07/15/2024 (Approximate)   BMI 20.74 kg/m²   Reviewed prenatal labs   Enrolled in connected moms    1. Intractable chronic migraine without aura and without status migrainosus  Overview:  Seeing neuro, botox injections are the only thing that helps, got them last pregnancy but having insurance issues with this pregnancy   Fioricet not helping      Orders:  -     Ambulatory referral/consult to Perinatology; Future; Expected date: 10/28/2024    2. Bipolar affective disorder, remission status unspecified  Overview:  Seeing Tiffanie Galvez at [facility]  Lamictal and seroquel         Will place consult for MFM   Ultrasound done 10/3/24 with SVIUP, fetus grossly WNL with normal cardiac acitivity, uterus, cervix and adnexae WNL, no fluid seen in cul-de-sac, LAURA 25, 11w0d  Reviewed warning signs, pregnancy precautions and how/when to call.  RTC x 4 wks, call or present sooner prn.

## 2024-10-22 ENCOUNTER — TELEPHONE (OUTPATIENT)
Dept: OBSTETRICS AND GYNECOLOGY | Facility: CLINIC | Age: 29
End: 2024-10-22
Payer: COMMERCIAL

## 2024-10-23 ENCOUNTER — PATIENT MESSAGE (OUTPATIENT)
Dept: ADMINISTRATIVE | Facility: OTHER | Age: 29
End: 2024-10-23
Payer: COMMERCIAL

## 2024-10-23 ENCOUNTER — PATIENT MESSAGE (OUTPATIENT)
Dept: PSYCHIATRY | Facility: CLINIC | Age: 29
End: 2024-10-23
Payer: COMMERCIAL

## 2024-10-23 ENCOUNTER — OFFICE VISIT (OUTPATIENT)
Dept: PSYCHIATRY | Facility: CLINIC | Age: 29
End: 2024-10-23
Payer: COMMERCIAL

## 2024-10-23 DIAGNOSIS — F90.0 ATTENTION DEFICIT HYPERACTIVITY DISORDER (ADHD), PREDOMINANTLY INATTENTIVE TYPE: ICD-10-CM

## 2024-10-23 DIAGNOSIS — F41.9 ANXIETY: ICD-10-CM

## 2024-10-23 DIAGNOSIS — F31.9 BIPOLAR DISEASE DURING PREGNANCY IN FIRST TRIMESTER: Primary | ICD-10-CM

## 2024-10-23 DIAGNOSIS — O99.341 BIPOLAR DISEASE DURING PREGNANCY IN FIRST TRIMESTER: Primary | ICD-10-CM

## 2024-10-23 PROCEDURE — 90834 PSYTX W PT 45 MINUTES: CPT | Mod: 95,,, | Performed by: SOCIAL WORKER

## 2024-11-04 ENCOUNTER — OFFICE VISIT (OUTPATIENT)
Dept: MATERNAL FETAL MEDICINE | Facility: CLINIC | Age: 29
End: 2024-11-04
Attending: OBSTETRICS & GYNECOLOGY
Payer: COMMERCIAL

## 2024-11-04 ENCOUNTER — PROCEDURE VISIT (OUTPATIENT)
Dept: NEUROLOGY | Facility: CLINIC | Age: 29
End: 2024-11-04
Payer: COMMERCIAL

## 2024-11-04 ENCOUNTER — PATIENT MESSAGE (OUTPATIENT)
Dept: OTHER | Facility: OTHER | Age: 29
End: 2024-11-04
Payer: COMMERCIAL

## 2024-11-04 VITALS
RESPIRATION RATE: 17 BRPM | HEIGHT: 61 IN | SYSTOLIC BLOOD PRESSURE: 109 MMHG | HEART RATE: 93 BPM | WEIGHT: 109.81 LBS | TEMPERATURE: 98 F | DIASTOLIC BLOOD PRESSURE: 62 MMHG | BODY MASS INDEX: 20.73 KG/M2

## 2024-11-04 DIAGNOSIS — G43.719 INTRACTABLE CHRONIC MIGRAINE WITHOUT AURA AND WITHOUT STATUS MIGRAINOSUS: Primary | ICD-10-CM

## 2024-11-04 DIAGNOSIS — G43.719 INTRACTABLE CHRONIC MIGRAINE WITHOUT AURA AND WITHOUT STATUS MIGRAINOSUS: ICD-10-CM

## 2024-11-04 PROCEDURE — 64615 CHEMODENERV MUSC MIGRAINE: CPT | Mod: S$GLB,,, | Performed by: NURSE PRACTITIONER

## 2024-11-04 PROCEDURE — 99214 OFFICE O/P EST MOD 30 MIN: CPT | Mod: 95,,, | Performed by: OBSTETRICS & GYNECOLOGY

## 2024-11-04 NOTE — PROGRESS NOTES
The patient location is: [place]  The chief complaint leading to consultation is: Migraines    Visit type: audiovisual    Face to Face time with patient: 35  60 minutes of total time spent on the encounter, which includes face to face time and non-face to face time preparing to see the patient (eg, review of tests), Obtaining and/or reviewing separately obtained history, Documenting clinical information in the electronic or other health record, Independently interpreting results (not separately reported) and communicating results to the patient/family/caregiver, or Care coordination (not separately reported).         Each patient to whom he or she provides medical services by telemedicine is:  (1) informed of the relationship between the physician and patient and the respective role of any other health care provider with respect to management of the patient; and (2) notified that he or she may decline to receive medical services by telemedicine and may withdraw from such care at any time.    Notes:       Chief complaint: Refractory migaine headaches    Provider requesting consultation: COLIN Atkins CNM    29 y.o. [id number] 16w1d EGA  PMH:  Past Medical History:   Diagnosis Date    ADHD (attention deficit hyperactivity disorder)     Anemia     ASCUS of cervix with negative high risk HPV 2023    Bipolar disorder     Chicken pox     childhood    Headache     Heart palpitations     Hypotension     Insomnia     Migraine     Obstetrical laceration, second degree 2023    Postpartum depression     Substance abuse     prior use, no current use    Syncope 2009    admitted x 1    Syncope        PObHx:  OB History    Para Term  AB Living   3 2 2     2   SAB IAB Ectopic Multiple Live Births         0 2      # Outcome Date GA Lbr Karlo/2nd Weight Sex Type Anes PTL Lv   3 Current            2 Term 23 40w6d / 00:10 3.5 kg (7 lb 11.5 oz) M Vag-Spont EPI N MONICA      Complications: Fetal Intolerance   1 Term       Vag-Spont          PSH:  Past Surgical History:   Procedure Laterality Date    dental extraction (2013)  2013    RHINOPLASTY  2010    TONSILLECTOMY         Family history:family history includes Arthritis in her paternal grandmother; Asthma in her paternal grandmother; Bipolar disorder in her mother; Breast cancer in her mother; Breast cancer (age of onset: 55) in her paternal grandmother; Cancer in her mother, paternal grandfather, and paternal grandmother; Cervical cancer in her mother; Depression in her paternal grandmother; Heart block in her paternal grandfather; Heart disease in her paternal grandfather; Hyperlipidemia in her father, paternal grandfather, and paternal grandmother; Hypertension in her paternal grandfather and paternal grandmother; Mental illness in her mother; Skin cancer in her mother; Stroke in her paternal grandmother.    Social history: reports that she has quit smoking. Her smoking use included vaping with nicotine. She has been exposed to tobacco smoke. She has never used smokeless tobacco. She reports that she does not currently use alcohol. She reports that she does not currently use drugs after having used the following drugs: Amphetamines and Marijuana.    A detailed fetal anatomical ultrasound was completed today.  See details in imaging section of [facility].      MIGRAINE HEADACHES    The patient has more than 15 headaches a month which last for more than 4 hours a day. The patient has been unresponsive and refractory to multiple attempts at conventional therapy including  lamotrigine, seroquel, Lyrica, Topamax, desipramine, fluoxetine, Wellbutrin, Celexa, Lexapro, gabapentin.  She has had good results in the past with Botox injections but had to stop 2/2 insurance issues.  She had her first Botox injection yesterday and has had some relief.      In pregnancy, migraine headaches often improve, with about 1/3 of patients experiencing worsening symptoms. The mainstays of therapy include  supportive care and acetaminophen use. NSAIDS should be avoided due to potential premature closure of the ductus arteriosus. Second line therapy includes the use of Metoclopramide and Benadryl (which has produced a hyperactivity reaction in her).  She has not tried Periactin.  Opioid use is discouraged as they have a habit-forming quality and can worsen the clinical situation with the formation of rebound headaches. Fioricet is no longer considered a good alternative due to the absence of supplemental analgesia and the associated risks of medication overuse headache and addiction.    Pharmacologic interventions to prevent primary headaches during pregnancy may be considered in those experiencing frequent or disruptive headaches and after careful discussion with the patient .  In her case she has tried mot of   First line prophylaxis in the prevention of primary headaches are calcium channel blockers and antihistamines. The use of beta-blockers and oral magnesium remain reasonable options as well.  She has proven refractory to most of these interventions.    If an antiepileptic is to be considered for headache prevention, carbamazepine should be discussed. However, it may be associated with major congenital anomalies (OR 1.37) and minor anomalies (OR 10.8).  She is now past the point in gestation where this would be an issue.      Recommendations:  Avoid NSAID use  Encourage hydration  Continue Botox treatments.  Since she continues to have frequent headaches, addition of a prophylactic agent is suggested. We will discuss this with neurology.  Limited information on Nurtec use after the 1st trimester is available, registry was reviewed.   Continue consultation and follow-up with Neurology  If migraines occur >1x per week, we encourage consideration of prophylactic treatment; however avoid use in the first trimester if possible  Consider for treatment:  Acetaminophen 1000 mg every 6-8 hours PO as needed for recurrent  headache  Can pair with use of caffeine, though not more than 200 mg daily, metoclopramide 10 mg PO (Rx sent), Mag sulfate 400mg PO or Periactin.  Persistent headaches despite that regimen should be evaluated by primary team  For prophylaxis against recurrent headaches, consider magnesium sulfate 400 mg PO daily in addition to her Botox.  Regardless of continuation or discontinuation of any medications, continue serial growth ultrasounds due to her medication exposures during gestation.    her on signs and symptoms of preeclampsia, as her headaches may confound diagnosis    I will discuss further treatment with neurology.    The patient was given an opportunity to ask questions about management and the diease process.  She expressed an understanding of and agreement to the above impression and plan. All questions were answered to her satisfaction.  She was given contact information to the [facility] clinic to address further concerns.

## 2024-11-04 NOTE — PROCEDURES
Procedures  A time out was conducted just before the start of the procedure to verify the correct patient and procedure, procedure location, and all relevant critical information.      Conventional methods of treatment such as multiple medications, both on and   off label have been tried including: lamotrigine, seroquel, Lyrica, Topamax, desipramine, fluoxetine, Wellbutrin, Celexa, Lexapro, gabapentin     The patient has been unresponsive and refractory.The patient meets criteria for chronic headaches according to the ICHD-II, the patient has more than 15 headaches a month which last for more than 4 hours a day.     Botox session number: 1 (last Botox was over 6 months ago)  Last session was 12 weeks ago and resulted in improvement of: n/a     I am aiming for at least 50%  improvement in the patient's symptoms. Frequency of treatment is every 3 months unless no response to the treatments, at which time we will discontinue the injections.      DESCRIPTION OF PROCEDURE: After obtaining informed consent and under   aseptic technique, a total of 155 units of botulinum toxin type A were   injected in the following muscles:      -- Procerus 5 units  --  5 units bilaterally  -- Frontalis 20 units  -- Temporalis 20 units bilaterally  -- Occipitalis 15 units bilaterally  -- Upper cervical paraspinals 10 units bilaterally  -- Trapezius 15 units bilaterally.      The patient tolerated the procedure well. There were no complications. The patient was given a prescription for repeat treatment in 12 weeks      Unavoidable waste 45 units    SERA Goodman

## 2024-11-08 ENCOUNTER — PATIENT MESSAGE (OUTPATIENT)
Dept: OBSTETRICS AND GYNECOLOGY | Facility: CLINIC | Age: 29
End: 2024-11-08
Payer: COMMERCIAL

## 2024-11-11 ENCOUNTER — PATIENT MESSAGE (OUTPATIENT)
Dept: OTHER | Facility: OTHER | Age: 29
End: 2024-11-11
Payer: COMMERCIAL

## 2024-11-14 ENCOUNTER — ROUTINE PRENATAL (OUTPATIENT)
Dept: OBSTETRICS AND GYNECOLOGY | Facility: CLINIC | Age: 29
End: 2024-11-14
Payer: COMMERCIAL

## 2024-11-14 VITALS
SYSTOLIC BLOOD PRESSURE: 103 MMHG | WEIGHT: 110.25 LBS | BODY MASS INDEX: 20.83 KG/M2 | DIASTOLIC BLOOD PRESSURE: 59 MMHG

## 2024-11-14 DIAGNOSIS — Z36.89 ENCOUNTER FOR FETAL ANATOMIC SURVEY: ICD-10-CM

## 2024-11-14 DIAGNOSIS — F31.9 BIPOLAR AFFECTIVE DISORDER, REMISSION STATUS UNSPECIFIED: ICD-10-CM

## 2024-11-14 DIAGNOSIS — Z34.82 ENCOUNTER FOR SUPERVISION OF OTHER NORMAL PREGNANCY IN SECOND TRIMESTER: Primary | ICD-10-CM

## 2024-11-14 DIAGNOSIS — G43.719 INTRACTABLE CHRONIC MIGRAINE WITHOUT AURA AND WITHOUT STATUS MIGRAINOSUS: ICD-10-CM

## 2024-11-14 PROCEDURE — 0502F SUBSEQUENT PRENATAL CARE: CPT | Mod: S$GLB,,, | Performed by: ADVANCED PRACTICE MIDWIFE

## 2024-11-14 PROCEDURE — 99999 PR PBB SHADOW E&M-EST. PATIENT-LVL II: CPT | Mod: PBBFAC,,, | Performed by: ADVANCED PRACTICE MIDWIFE

## 2024-11-14 RX ORDER — METOCLOPRAMIDE 10 MG/1
10 TABLET ORAL
Qty: 90 TABLET | Refills: 1 | Status: SHIPPED | OUTPATIENT
Start: 2024-11-14 | End: 2025-11-14

## 2024-11-14 RX ORDER — MAGNESIUM SULFATE 100 MG
200 CAPSULE ORAL 2 TIMES DAILY PRN
Qty: 30 CAPSULE | Refills: 1 | Status: SHIPPED | OUTPATIENT
Start: 2024-11-14

## 2024-11-14 NOTE — PROGRESS NOTES
29 y.o. female  at 17w3d   Feeling flutters, denies VB, LOF or cramping  Doing well   BP (!) 103/59   Wt 50 kg (110 lb 3.7 oz)   LMP 07/15/2024 (Approximate)   BMI 20.83 kg/m²   TW lbs   Genetic testing not done  Anatomy scan ordered    1. Encounter for fetal anatomic survey  -     US OB/GYN Procedure ([facility])-Future; Future    2. Encounter for supervision of other normal pregnancy in second trimester    3. Intractable chronic migraine without aura and without status migrainosus  Overview:  Seeing neuro, botox injections are the only thing that helps, got them last pregnancy but having insurance issues with this pregnancy   Fioricet not helping      Orders:  -     metoclopramide HCl (REGLAN) 10 MG tablet; Take 1 tablet (10 mg total) by mouth 3 (three) times daily with meals.  Dispense: 90 tablet; Refill: 1  -     magnesium sulfate 100 mg Cap; Take 200 mg by mouth 2 (two) times daily as needed (Migraine).  Dispense: 30 capsule; Refill: 1    4. Bipolar affective disorder, remission status unspecified  Overview:  Seeing Tiffanie Galvez at [facility] Lamictal and seroquel         Discussed BTL vs  vasectomy, will have him call his insurance to see cost  Reviewed warning signs, pregnancy precautions and how/when to call.  RTC x 3 wks, call or present sooner prn.     Reviewed MFM recommendations:   Recommendations:  · Avoid NSAID use  · Encourage hydration  · Continue Botox treatments.  Since she continues to have frequent headaches, addition of a prophylactic agent is suggested. We will discuss this with neurology.  Limited information on Nurtec use after the 1st trimester is available, registry was reviewed.   · Continue consultation and follow-up with Neurology  · If migraines occur >1x per week, we encourage consideration of prophylactic treatment; however avoid use in the first trimester if possible  · Consider for treatment:  º Acetaminophen 1000 mg every 6-8 hours PO as needed for recurrent  headache  º Can pair with use of caffeine, though not more than 200 mg daily, metoclopramide 10 mg PO (Rx sent), Mag sulfate 400mg PO or Periactin.  º Persistent headaches despite that regimen should be evaluated by primary team  · For prophylaxis against recurrent headaches, consider magnesium sulfate 400 mg PO daily in addition to her Botox.  · Regardless of continuation or discontinuation of any medications, continue serial growth ultrasounds due to her medication exposures during gestation.   ·  her on signs and symptoms of preeclampsia, as her headaches may confound diagnosis

## 2024-11-19 ENCOUNTER — OFFICE VISIT (OUTPATIENT)
Dept: INTERNAL MEDICINE | Facility: CLINIC | Age: 29
End: 2024-11-19
Payer: COMMERCIAL

## 2024-11-19 VITALS
BODY MASS INDEX: 21.18 KG/M2 | OXYGEN SATURATION: 97 % | SYSTOLIC BLOOD PRESSURE: 102 MMHG | HEIGHT: 61 IN | DIASTOLIC BLOOD PRESSURE: 78 MMHG | WEIGHT: 112.19 LBS | HEART RATE: 99 BPM | TEMPERATURE: 97 F

## 2024-11-19 DIAGNOSIS — J02.9 SORE THROAT: ICD-10-CM

## 2024-11-19 DIAGNOSIS — J06.9 VIRAL URI: Primary | ICD-10-CM

## 2024-11-19 LAB
CTP QC/QA: YES
MOLECULAR STREP A: NEGATIVE

## 2024-11-19 PROCEDURE — 99213 OFFICE O/P EST LOW 20 MIN: CPT | Mod: S$GLB,,, | Performed by: NURSE PRACTITIONER

## 2024-11-19 PROCEDURE — 87651 STREP A DNA AMP PROBE: CPT | Mod: QW,S$GLB,, | Performed by: NURSE PRACTITIONER

## 2024-11-19 PROCEDURE — 99999 PR PBB SHADOW E&M-EST. PATIENT-LVL III: CPT | Mod: PBBFAC,,, | Performed by: NURSE PRACTITIONER

## 2024-11-19 NOTE — PROGRESS NOTES
"Subjective:       Patient ID: Iris Johnson is a 29 y.o. female.    Chief Complaint: Sore Throat (Sont tested positive for strep , pt has sore throat ) and Nasal Congestion    Pt presents to clinic today for sore throat and congestion  Younger child with cold, older child with strep  Started with symptoms a few days ago  Of note, she is 18 weeks pregnant.   She has been using honey and natural herbs to help manage her symptoms         /78 (Patient Position: Sitting)   Pulse 99   Temp 97 °F (36.1 °C) (Tympanic)   Ht 5' 1" (1.549 m)   Wt 50.9 kg (112 lb 3.4 oz)   LMP 07/15/2024 (Approximate)   SpO2 97%   BMI 21.20 kg/m²     Review of Systems   Constitutional:  Negative for activity change, appetite change, chills, diaphoresis, fatigue, fever and unexpected weight change.   HENT:  Positive for congestion, ear pain, postnasal drip, rhinorrhea, sneezing and sore throat. Negative for dental problem, drooling, ear discharge, hearing loss, mouth sores, nosebleeds, tinnitus, trouble swallowing and voice change.    Eyes:  Negative for pain, discharge and redness.   Respiratory:  Positive for cough. Negative for choking, chest tightness, shortness of breath and wheezing.    Cardiovascular:  Negative for chest pain, palpitations and leg swelling.   Gastrointestinal:  Negative for abdominal pain, diarrhea, nausea and vomiting.   Endocrine: Negative for cold intolerance and heat intolerance.   Genitourinary:  Negative for dysuria.   Musculoskeletal:  Negative for arthralgias, joint swelling, myalgias and neck pain.   Skin:  Negative for rash.   Allergic/Immunologic: Positive for environmental allergies. Negative for food allergies and immunocompromised state.   Neurological:  Negative for syncope, light-headedness and headaches.       Objective:      Physical Exam  Vitals and nursing note reviewed.   Constitutional:       Appearance: She is well-developed. She is not diaphoretic.   HENT:      Head: Normocephalic and " atraumatic.      Right Ear: Tympanic membrane, ear canal and external ear normal.      Left Ear: Tympanic membrane, ear canal and external ear normal.      Nose: Mucosal edema and rhinorrhea present.      Right Sinus: No maxillary sinus tenderness or frontal sinus tenderness.      Left Sinus: No maxillary sinus tenderness or frontal sinus tenderness.      Mouth/Throat:      Pharynx: Uvula midline. No oropharyngeal exudate or posterior oropharyngeal erythema.   Eyes:      General:         Right eye: No discharge.         Left eye: No discharge.      Conjunctiva/sclera: Conjunctivae normal.   Cardiovascular:      Rate and Rhythm: Normal rate and regular rhythm.      Heart sounds: Normal heart sounds. No murmur heard.  Pulmonary:      Effort: Pulmonary effort is normal. No accessory muscle usage or respiratory distress.      Breath sounds: Normal breath sounds. No decreased breath sounds, wheezing, rhonchi or rales.   Chest:      Chest wall: No tenderness.   Abdominal:      General: There is no distension.      Palpations: Abdomen is soft.   Musculoskeletal:         General: Normal range of motion.      Cervical back: Normal range of motion.   Skin:     General: Skin is warm and dry.   Neurological:      Mental Status: She is alert and oriented to person, place, and time.   Psychiatric:         Behavior: Behavior normal.         Assessment:       1. Viral URI    2. Sore throat        Plan:       Iris was seen today for sore throat and nasal congestion.    Diagnoses and all orders for this visit:    Viral URI    Sore throat  -     POCT Strep A, Molecular      Supportive care discussed  Safe use of meds in pregnancy discussed  Continue honey prn for cough  Monitor symptoms  Follow up for worsening or no improvement in symptoms and PRN.

## 2024-11-21 ENCOUNTER — PATIENT MESSAGE (OUTPATIENT)
Dept: MATERNAL FETAL MEDICINE | Facility: CLINIC | Age: 29
End: 2024-11-21
Payer: COMMERCIAL

## 2024-11-22 ENCOUNTER — PATIENT MESSAGE (OUTPATIENT)
Dept: MATERNAL FETAL MEDICINE | Facility: CLINIC | Age: 29
End: 2024-11-22

## 2024-11-22 ENCOUNTER — OFFICE VISIT (OUTPATIENT)
Dept: MATERNAL FETAL MEDICINE | Facility: CLINIC | Age: 29
End: 2024-11-22
Attending: OBSTETRICS & GYNECOLOGY
Payer: COMMERCIAL

## 2024-11-22 DIAGNOSIS — G43.719 INTRACTABLE CHRONIC MIGRAINE WITHOUT AURA AND WITHOUT STATUS MIGRAINOSUS: Primary | ICD-10-CM

## 2024-11-22 DIAGNOSIS — Z34.82 ENCOUNTER FOR SUPERVISION OF OTHER NORMAL PREGNANCY IN SECOND TRIMESTER: ICD-10-CM

## 2024-11-22 RX ORDER — CYPROHEPTADINE HYDROCHLORIDE 4 MG/1
4 TABLET ORAL 3 TIMES DAILY
Qty: 100 TABLET | Refills: 3 | Status: SHIPPED | OUTPATIENT
Start: 2024-11-22

## 2024-11-22 NOTE — PROGRESS NOTES
The patient location is: [place]  The chief complaint leading to consultation is: Migraine H/A complicating pa    Visit type: audiovisual    Face to Face time with patient: 15 min  20 minutes of total time spent on the encounter, which includes face to face time and non-face to face time preparing to see the patient (eg, review of tests), Obtaining and/or reviewing separately obtained history, Documenting clinical information in the electronic or other health record, Independently interpreting results (not separately reported) and communicating results to the patient/family/caregiver, or Care coordination (not separately reported).         Each patient to whom he or she provides medical services by telemedicine is:  (1) informed of the relationship between the physician and patient and the respective role of any other health care provider with respect to management of the patient; and (2) notified that he or she may decline to receive medical services by telemedicine and may withdraw from such care at any time.    Notes:       Maternal Fetal Medicine follow up consult    SUBJECTIVE:     Iris Johnson is a 29 y.o.  female with IUP at 18w4d who is seen in follow up consultation by [facility].  Pregnancy complications include:   No problems updated.    Previous notes reviewed.   No changes to medical, surgical, family, social, or obstetric history.    Interval history since last M visit:  No change in headaches.  Her children and she have been ill with viral symptoms.  She has been prescribed Reglan most likely for nausea but could not find this in the prior notes.  She has not picked this up from the pharmacy because of illness.    Medications reviewed.    Care team members:  COLIN LONGORIAM - Primary OB       OBJECTIVE:   LMP 07/15/2024 (Approximate)       ASSESSMENT/PLAN:     29 y.o.  female with IUP at 18w4d  I have discussed her case with neurology.  They are very uncomfortable using Nurtec due to its lack  of information in pregnancy.  They have recommended magnesium but this has not worked for her in the past.  We have also discussed the use of Periactin.  She has not tried this.  I have prescribed Periactin 4 mg 3 times a day.  Given that she says she has significant difficulty sleeping I told her to take a double dose before bedtime and a single dose of 4 mg on a b.i.d. dosage schedule.    I will see her back by telemedicine in 2-3 weeks' time to evaluate effectiveness of the Periactin.  The patient was given an opportunity to ask questions about management and the diease process.  She expressed an understanding of and agreement to the above impression and plan. All questions were answered to her satisfaction.  She was given contact information to the [facility] clinic to address further concerns.

## 2024-11-30 ENCOUNTER — PATIENT MESSAGE (OUTPATIENT)
Dept: NEUROLOGY | Facility: CLINIC | Age: 29
End: 2024-11-30
Payer: COMMERCIAL

## 2024-12-02 ENCOUNTER — PATIENT MESSAGE (OUTPATIENT)
Dept: OTHER | Facility: OTHER | Age: 29
End: 2024-12-02
Payer: COMMERCIAL

## 2024-12-05 ENCOUNTER — PROCEDURE VISIT (OUTPATIENT)
Dept: OBSTETRICS AND GYNECOLOGY | Facility: CLINIC | Age: 29
End: 2024-12-05
Payer: COMMERCIAL

## 2024-12-05 ENCOUNTER — ROUTINE PRENATAL (OUTPATIENT)
Dept: OBSTETRICS AND GYNECOLOGY | Facility: CLINIC | Age: 29
End: 2024-12-05
Payer: COMMERCIAL

## 2024-12-05 VITALS — DIASTOLIC BLOOD PRESSURE: 68 MMHG | SYSTOLIC BLOOD PRESSURE: 124 MMHG

## 2024-12-05 DIAGNOSIS — Z34.82 ENCOUNTER FOR SUPERVISION OF OTHER NORMAL PREGNANCY IN SECOND TRIMESTER: ICD-10-CM

## 2024-12-05 DIAGNOSIS — F41.9 ANXIETY: ICD-10-CM

## 2024-12-05 DIAGNOSIS — F31.9 BIPOLAR AFFECTIVE DISORDER, REMISSION STATUS UNSPECIFIED: ICD-10-CM

## 2024-12-05 DIAGNOSIS — G43.719 INTRACTABLE CHRONIC MIGRAINE WITHOUT AURA AND WITHOUT STATUS MIGRAINOSUS: ICD-10-CM

## 2024-12-05 DIAGNOSIS — F90.0 ATTENTION DEFICIT HYPERACTIVITY DISORDER (ADHD), PREDOMINANTLY INATTENTIVE TYPE: Primary | ICD-10-CM

## 2024-12-05 DIAGNOSIS — Z36.89 ENCOUNTER FOR FETAL ANATOMIC SURVEY: ICD-10-CM

## 2024-12-05 PROCEDURE — 99999 PR PBB SHADOW E&M-EST. PATIENT-LVL II: CPT | Mod: PBBFAC,,,

## 2024-12-05 PROCEDURE — 76805 OB US >/= 14 WKS SNGL FETUS: CPT | Mod: S$GLB,,, | Performed by: OBSTETRICS & GYNECOLOGY

## 2024-12-05 NOTE — PROGRESS NOTES
29 y.o. female  at 20w3d   is feeling flutters/FM, denies VB, LOF or cramping  Doing well without concerns  TW lbs   /68   LMP 07/15/2024 (Approximate)      Reviewed anatomy US-normal fetal anatomy with no obvious abnormalities. S=D. Normal amniotic fluid, normal placental location in anterior position, no evidence of previa. Normal appearing cervical length at 35.5mm. male gender. 3VC. EFW 12%tile. Breech Will await MFM recommendations.      1. Attention deficit hyperactivity disorder (ADHD), predominantly inattentive type  Overview:  No meds      2. Bipolar affective disorder, remission status unspecified  Overview:  Seeing Tiffanie Galvez at [facility]  Lamictal and seroquel      3. Anxiety    4. Intractable chronic migraine without aura and without status migrainosus  Overview:  Pt trying magnesium oil for relief  Seeing neuro, botox injections are the only thing that helps, got them last pregnancy but having insurance issues with this pregnancy   Fioricet not helping        5. Encounter for supervision of other normal pregnancy in second trimester         Reviewed warning signs, normal FM,  labor precautions and how/when to call.  RTC x 4 wks, call or present sooner prn.

## 2024-12-12 ENCOUNTER — OFFICE VISIT (OUTPATIENT)
Dept: MATERNAL FETAL MEDICINE | Facility: CLINIC | Age: 29
End: 2024-12-12
Attending: OBSTETRICS & GYNECOLOGY
Payer: COMMERCIAL

## 2024-12-12 ENCOUNTER — TELEPHONE (OUTPATIENT)
Dept: OBSTETRICS AND GYNECOLOGY | Facility: CLINIC | Age: 29
End: 2024-12-12
Payer: COMMERCIAL

## 2024-12-12 ENCOUNTER — PATIENT MESSAGE (OUTPATIENT)
Dept: OBSTETRICS AND GYNECOLOGY | Facility: CLINIC | Age: 29
End: 2024-12-12
Payer: COMMERCIAL

## 2024-12-12 DIAGNOSIS — Z34.02 ENCOUNTER FOR SUPERVISION OF NORMAL FIRST PREGNANCY IN SECOND TRIMESTER: Primary | ICD-10-CM

## 2024-12-12 DIAGNOSIS — G43.719 INTRACTABLE CHRONIC MIGRAINE WITHOUT AURA AND WITHOUT STATUS MIGRAINOSUS: ICD-10-CM

## 2024-12-12 NOTE — PROGRESS NOTES
The patient location is: [facility]  The chief complaint leading to consultation is: Migraines complicating pregnancy    Visit type: audiovisual    Face to Face time with patient: 15 min  20 minutes of total time spent on the encounter, which includes face to face time and non-face to face time preparing to see the patient (eg, review of tests), Obtaining and/or reviewing separately obtained history, Documenting clinical information in the electronic or other health record, Independently interpreting results (not separately reported) and communicating results to the patient/family/caregiver, or Care coordination (not separately reported).         Each patient to whom he or she provides medical services by telemedicine is:  (1) informed of the relationship between the physician and patient and the respective role of any other health care provider with respect to management of the patient; and (2) notified that he or she may decline to receive medical services by telemedicine and may withdraw from such care at any time.    Notes:     Maternal Fetal Medicine follow up consult    SUBJECTIVE:     Iris Johnson is a 29 y.o.  female with IUP at 21w3d who is seen in follow up consultation by [facility].  Pregnancy complications include:   No problems updated.    Previous notes reviewed.   No changes to medical, surgical, family, social, or obstetric history.    Interval history since last MFM visit:  The patient states that her migraines have been improving with Botox injections.  She has not started taking the Periactin.  She does report recent problems with dental pain and is receiving treatment for this.  I have asked her to send these notes to us.    Medications reviewed.    Care team members:  COLIN Atkins CNM - Primary OB       OBJECTIVE:   LMP 07/15/2024 (Approximate)       ASSESSMENT/PLAN:     29 y.o.  female with IUP at 21w3d    As neurology has apparently.  Care for her migraines [facility] will follow this from a  distance.  Dental work on going  The patient was given an opportunity to ask questions about management and the diease process.  She expressed an understanding of and agreement to the above impression and plan. All questions were answered to her satisfaction.  She was given contact information to the [facility] clinic to address further concerns.

## 2024-12-12 NOTE — TELEPHONE ENCOUNTER
----- Message from Michael Atkins sent at 12/11/2024  7:32 AM CST -----  Yes  ----- Message -----  From: Sony Iraheta MA  Sent: 12/9/2024   4:06 PM CST  To: Michael Atkins CNM    Is it ok for me to write a letter for her?  ----- Message -----  From: Shahana Adams  Sent: 12/9/2024   2:54 PM CST  To: [facility] Staff    Name of Caller:Rosalie with Dr. Clifford Garcia Call Back Number:[phone] and fax number is [phone]  Additional Information: Patient is 21 weeks pregnant and she is requesting a release for dental work.

## 2024-12-12 NOTE — LETTER
December 12, 2024    Iris Johnson  [address] [place] [address]              [facility] 2nd Fl  [address] [facility]  [place] [address]  Phone: [phone]  Fax: [phone]      To Whom It May Concern:    Ms. Johnson is currently under our care for pregnancy.  Estimated Date of Delivery: 4/21/2025    Any elective dental work should preferably be scheduled during or after the mid-trimester or postpartum.    Dental procedures can be performed with local anesthesia without epinephrine. Recommended antibiotics include penicillins, erythromycin, and cephalosporins. Tylenol #3 or Percocet may be used for pain control. No x-rays are allowed for routine screening. For dental problems, up to four x-rays may be taken with proper abdominal shield.    Sincerely,    Sony Iraheta MA

## 2024-12-16 ENCOUNTER — PATIENT MESSAGE (OUTPATIENT)
Dept: NEUROLOGY | Facility: CLINIC | Age: 29
End: 2024-12-16
Payer: COMMERCIAL

## 2024-12-16 ENCOUNTER — PATIENT MESSAGE (OUTPATIENT)
Dept: OBSTETRICS AND GYNECOLOGY | Facility: CLINIC | Age: 29
End: 2024-12-16
Payer: COMMERCIAL

## 2024-12-17 ENCOUNTER — OFFICE VISIT (OUTPATIENT)
Dept: NEUROLOGY | Facility: CLINIC | Age: 29
End: 2024-12-17
Payer: COMMERCIAL

## 2024-12-17 DIAGNOSIS — G43.719 INTRACTABLE CHRONIC MIGRAINE WITHOUT AURA AND WITHOUT STATUS MIGRAINOSUS: Primary | ICD-10-CM

## 2024-12-17 DIAGNOSIS — B37.31 VAGINA, CANDIDIASIS: Primary | ICD-10-CM

## 2024-12-17 PROCEDURE — 99213 OFFICE O/P EST LOW 20 MIN: CPT | Mod: 95,,, | Performed by: NURSE PRACTITIONER

## 2024-12-17 RX ORDER — FLUCONAZOLE 100 MG/1
100 TABLET ORAL DAILY
Qty: 1 TABLET | Refills: 0 | Status: SHIPPED | OUTPATIENT
Start: 2024-12-17 | End: 2024-12-18

## 2024-12-17 NOTE — PROGRESS NOTES
[facility]    Chief complaint: chronic migraine     S:    29 y.o. ambidextrous F with PMHx of ADHD, iron deficiency anemia, bipolar 1 disorder, OCD, anxiety, depression, insomnia who presents for further follow up of headache.     INTERVAL HISTORY 12/17/24  The patient location is: home   The chief complaint leading to consultation is: follow up  Visit type: audiovisual  20 minutes of total time spent on the encounter, which includes face to face time and non-face to face time preparing to see the patient (eg, review of tests), Obtaining and/or reviewing separately obtained history, Documenting clinical information in the electronic or other health record, Independently interpreting results (not separately reported) and communicating results to the patient/family/caregiver, or Care coordination (not separately reported).   Each patient to whom he or she provides medical services by telemedicine is:  (1) informed of the relationship between the physician and patient and the respective role of any other health care provider with respect to management of the patient; and (2) notified that he or she may decline to receive medical services by telemedicine and may withdraw from such care at any time.    Notes:     Last office visit was seven months ago and we restarted Botox about six weeks ago. Periactin has been added since Botox restarted. She is 22 weeks pregnant.     Today she reports she is much better. She has fewer headaches. She does not feel the periactin is helping. She takes tylenol often to treat headaches. Fioricet was previously not helpful. No side effects to Botox. Otherwise information below is reviewed and verified with no changes made    INTERVAL HISTORY 5/27/24  The patient location is: home  The chief complaint leading to consultation is: follow up  Visit type: audiovisual  Face to Face time with patient: 10  20 minutes of total time spent on the encounter,  which includes face to face time and non-face to face time preparing to see the patient (eg, review of tests), Obtaining and/or reviewing separately obtained history, Documenting clinical information in the electronic or other health record, Independently interpreting results (not separately reported) and communicating results to the patient/family/caregiver, or Care coordination (not separately reported).   Each patient to whom he or she provides medical services by telemedicine is:  (1) informed of the relationship between the physician and patient and the respective role of any other health care provider with respect to management of the patient; and (2) notified that he or she may decline to receive medical services by telemedicine and may withdraw from such care at any time.    Notes:     Last office visit was about 18 months ago and most recent Botox was two weeks ago.    Today she reports she is doing well. She has about 10-15 mild headaches per month with 5-6 escalation to migraine per month. Current head pain 0 with range 0-10. She takes Nurtec or eletriptan both are effective. Otherwise information below is reviewed and verified with no changes made    INTERVAL HISTORY 10/25/22  The patient location is: home  The chief complaint leading to consultation is: follow up  Visit type: audiovisual  Face to Face time with patient: 15  20 minutes of total time spent on the encounter, which includes face to face time and non-face to face time preparing to see the patient (eg, review of tests), Obtaining and/or reviewing separately obtained history, Documenting clinical information in the electronic or other health record, Independently interpreting results (not separately reported) and communicating results to the patient/family/caregiver, or Care coordination (not separately reported).   Each patient to whom he or she provides medical services by telemedicine is:  (1) informed of the relationship between the  physician and patient and the respective role of any other health care provider with respect to management of the patient; and (2) notified that he or she may decline to receive medical services by telemedicine and may withdraw from such care at any time.    Notes:     Previously She had completed 2 cycles of Botox. She mentions that after the Botox cycle 3 on 6/20/22 she had a migraine x 12 days. She treated with nurtec, eletriptan and sumatriptan SC with limited success. nurtec helped some, but eletriptan did not help at all. After those 12 days she is much better. She has had 2 migraine attacks last month and they were 2-4 days long. She feels that her acute medications work in the follow order from best to worse: sumatriptan sc (makes her have worsening pain, dizziness for about 30 minutes so has to lay down, but improves the intensity, is functional after a few hours, but migraine does not necessarily resolve) > Nurtec (if takes it and goes to bed typically wakes up attack free, but has to take it early >>eletriptan 40mg (has only a very small window to treat very early in process, if waits too long then will not be effect. Overall she is over 50% improved, but still battling the longer migraine attacks. She is open to other options for acute manageent. She does find Botox works well. The first Btx caused 10 day of migraine attack. The second did not. She is not sure if it was because she presented with severe migraine already that the headache occurred for so long. She has been having more neck pain and feels that might be triggering some of the headache as well. Typically will have neck pain along with the headache. She would like to continue Botox for prevention.    Today she is six weeks s/p third Botox and reports much better. She reports fewer headaches. In the past 2 months, she has had 3 migraines. Current pain 0 with range 0-9. She takes Nurtec, eletriptan, and sumatriptan IM depending on severity. She  has tried DHE NS with success too. She is having some dental issues which is causing some facial pain and headaches. She is planning to conceive when possible. She is on Seroquel and Lamictal. She has plans to stay on Seroquel but will start tapering off Lamictal. She wishes to stay on Botox when/if pregnant. Otherwise information below is reviewed and verified with no changes made    Headache history from initial evaluation on 11/30/21:  Age at onset and course over time: she had them since elementary school, she reports that she has been having over the years more days of headache since childhood. She mentions that since getting dark glasses in 2017 they improved some. She mentions that the only time she did not have migraine was during the pregnancy. She mentions that she even had Daith Piercing which did not work. She mentions that she could before push through day #4 of migraine, but she is mentions that she is having more disabling attacks.    Location: entire head   Character: stabbing, pressure, throbbing/pounding, sharp   Intensity:  3-10/10  Has about 10 days of no headache   Frequency: 20/30 days   She gets a sick feeling like she knows an attack will hit her. She mentions that if she does not wake up with one in the morning, by 2 pm it just hit her and just starts at once and all the symptoms start at once. She mentions that when she leaves work and has things to do and she will have worsening when she is doing things. She mentions that she has not been to work in over 2 weeks now . She mentions that she would leave at lunch time to go home and has not been back.   Duration: minimum 1 week of migraine , when it's severe she feels unsafe.   She is waking up with them, but if does not wake up with them by 2 pm she will have sudden onset of the attack.   She will use APAP/NSAID and will use   Aura: none   Associated symptoms: photophobia, phonophobia, osmophobia, kinesiophobia, neck tightness/pain,  nausea/vomiting  Other neurologic symptoms: dizziness, vertigo, ringing in the ears, mood changes, problems with concentration, memory, task completion, relaxation, neck tightness, neck pain.   Precipitating factors: light, noise  Alleviating factors: darkness, heat, ice, massage, local pressure  Aggravating factors: bright lights, loud noises  High pitched steady ringing in right ear  Family history of headache: paternal cousins, paternal grandmother has headache all the time  ER/UC visits:   Caffeine: 4 cups of coffee  Sleep: trouble falling asleep, trouble staying asleep, un refreshing sleep     Medication history:  Acute:  apap  nsaid- ibuprofen, asa, naproxen  fioricet  excedrin  Hydrocodone   Sumatriptan 20mg NS - does not like this  Sumatriptan 6mg sc - works well at pain relief, but then 30 minutes of feeling terrible after it so she holds off on using.   Eletriptan 40mg - can help , but very small window very early in migraine, otherwise did not work.   Nurtec ODT 75mg - helps some earlier in atacks.     Preventive:  Lamotrigine 200 mg for mood stabilizer  seroquel 300 mg nightly   pregabalin  topiramate   Desipramine   Fluoxetine   Bupropion   Citalopram  escitalopram  Gabapentin   Diazepam  Alprazolam   Lorazepam   clonazepam  Botox 155 units restarted on 11/4/24  Periactin     Neuroimaging and other studies:   She has had MRIs int he past and has been told normal.   She has metal in her tongue that is stuck and is trying to find someone to surgically remove it.       No changes to past medical history, surgical or family history since last appointment.   Social history:  Occupation: construction, manual labor, operating equipment.   Currently on CDL      7.5y son  No plans of further pregnancies.   Social History     Tobacco Use    Smoking status: Former     Types: Vaping with nicotine     Passive exposure: Past    Smokeless tobacco: Never   Substance Use Topics    Alcohol use: Not Currently      Comment: 1-2 drinks lper week    Drug use: Not Currently     Types: Amphetamines, Marijuana     Comment: narcotics, ecstacy (all prior use)     Review of patient's allergies indicates:   Allergen Reactions    Bactrim [sulfamethoxazole-trimethoprim]     Sulfa (sulfonamide antibiotics)        Current Outpatient Medications:     cyproheptadine (PERIACTIN) 4 mg tablet, Take 1 tablet (4 mg total) by mouth 3 (three) times daily., Disp: 100 tablet, Rfl: 3    fluconazole (DIFLUCAN) 100 MG tablet, Take 1 tablet (100 mg total) by mouth once daily. for 1 day, Disp: 1 tablet, Rfl: 0    lamoTRIgine (LAMICTAL) 200 MG tablet, Take 1 tablet (200 mg total) by mouth every morning., Disp: 30 tablet, Rfl: 3    QUEtiapine (SEROQUEL) 200 MG Tab, Take 1 tablet (200 mg total) by mouth every evening., Disp: 30 tablet, Rfl: 5    Current Facility-Administered Medications:     onabotulinumtoxina injection 200 Units, 200 Units, Intramuscular, q12 weeks, Deb Aguirre, NP, 200 Units at 08/31/23 1516    onabotulinumtoxina injection 200 Units, 200 Units, Intramuscular, q12 weeks, Deb Aguirre, NP, 200 Units at 11/04/24 1323      There were no vitals filed for this visit.     Impression:  Intractable Chronic migraine without aura - she has had lifelong migraine disease which has been chronic for many years without much improvement on oral preventives. She has been overusing OTC medications to get through the day and manage. She has been on OCPs for 7.5 years now without any worsening headache. Initially she was having 20/30 days of severe migraine with significant disability as per MIDAS score and history. Since third Botox for chornic migraine she has had in the last 6 weeks 1- migraine attacks, they are lasting less to 2 days and feels sumatriptan is working, but does not like the sumatriptan effect. We will transition her to 4mg sc. Also will provide Eletriptan 40mg for acute management of milder attacks and Nurtec to see if adding  that will also provide benefit without return of migraine. She is currently 22 weeks pregnant.     Medication overuse hadache - resolved.     Comorbidities:  ADHD -> Adderall    iron deficiency anemia    bipolar 1 disorder - sees psychiatry   OCD, anxiety, depression, insomnia -> behavioral health     Plan:   1- For preventive management: a. Continue Botox q 12weeks  Muscles to be injected:  total of 155 units of botulinum toxin type A were  injected in the following muscles: Procerus 5 units,  5 units bilaterally, frontalis 20 units, temporalis 20 units bilaterally, occipitalis 15 units, upper cervical paraspinals 10 units bilaterally and trapezius 15 units bilaterally. Total of 155 units in 31 sites.            2- Acute management:   Continue limited tylenol.      For nausea: Zofran ODT 4 mg every 8 hours as needed       RTC for next Botox.         SERA Goodman

## 2025-01-02 ENCOUNTER — ROUTINE PRENATAL (OUTPATIENT)
Dept: OBSTETRICS AND GYNECOLOGY | Facility: CLINIC | Age: 30
End: 2025-01-02
Payer: COMMERCIAL

## 2025-01-02 VITALS
BODY MASS INDEX: 22.62 KG/M2 | SYSTOLIC BLOOD PRESSURE: 112 MMHG | WEIGHT: 119.69 LBS | DIASTOLIC BLOOD PRESSURE: 60 MMHG

## 2025-01-02 DIAGNOSIS — G43.719 INTRACTABLE CHRONIC MIGRAINE WITHOUT AURA AND WITHOUT STATUS MIGRAINOSUS: ICD-10-CM

## 2025-01-02 DIAGNOSIS — Z34.82 ENCOUNTER FOR SUPERVISION OF OTHER NORMAL PREGNANCY IN SECOND TRIMESTER: Primary | ICD-10-CM

## 2025-01-02 DIAGNOSIS — F31.9 BIPOLAR AFFECTIVE DISORDER, REMISSION STATUS UNSPECIFIED: ICD-10-CM

## 2025-01-02 PROCEDURE — 99999 PR PBB SHADOW E&M-EST. PATIENT-LVL III: CPT | Mod: PBBFAC,,, | Performed by: ADVANCED PRACTICE MIDWIFE

## 2025-01-02 PROCEDURE — 0502F SUBSEQUENT PRENATAL CARE: CPT | Mod: S$GLB,,, | Performed by: ADVANCED PRACTICE MIDWIFE

## 2025-01-02 NOTE — PROGRESS NOTES
29 y.o. female  at 24w3d   Reports + FM, denies VB, LOF, or cramping  Doing well without concerns, has had some vomiting and diarrhea, getting better  /60   Wt 54.3 kg (119 lb 11.4 oz)   LMP 07/15/2024 (Approximate)   BMI 22.62 kg/m²   TWG: 10 lbs   Reviewed upcoming 28wk labs, (A POS) and orders placed  Tdap handout provided and explained      1. Encounter for supervision of other normal pregnancy in second trimester  -     OB Glucose Screen; Future; Expected date: 2025  -     CBC auto differential; Future; Expected date: 2025  -     Treponema Pallidium Antibodies IgG, IgM; Future; Expected date: 2025  -     HIV 1/2 Ag/Ab (4th Gen); Future; Expected date: 2025    2. Bipolar affective disorder, remission status unspecified  Overview:  Seeing Tiffanie Galvez at [facility]  Lamictal and seroquel      3. Intractable chronic migraine without aura and without status migrainosus  Overview:  Pt trying magnesium oil for relief  Seeing neuro, botox injections are the only thing that helps, got them last pregnancy but having insurance issues with this pregnancy   Fioricet not helping             Reviewed warning signs, normal FM,  labor precautions and how/when to call.  RTC x 4 wks, call or present sooner prn.

## 2025-01-13 ENCOUNTER — PATIENT MESSAGE (OUTPATIENT)
Dept: OTHER | Facility: OTHER | Age: 30
End: 2025-01-13
Payer: COMMERCIAL

## 2025-01-27 ENCOUNTER — PROCEDURE VISIT (OUTPATIENT)
Dept: NEUROLOGY | Facility: CLINIC | Age: 30
End: 2025-01-27
Payer: COMMERCIAL

## 2025-01-27 ENCOUNTER — PATIENT MESSAGE (OUTPATIENT)
Dept: OTHER | Facility: OTHER | Age: 30
End: 2025-01-27
Payer: COMMERCIAL

## 2025-01-27 VITALS
SYSTOLIC BLOOD PRESSURE: 114 MMHG | DIASTOLIC BLOOD PRESSURE: 67 MMHG | TEMPERATURE: 97 F | HEIGHT: 61 IN | WEIGHT: 119.69 LBS | BODY MASS INDEX: 22.6 KG/M2 | HEART RATE: 98 BPM | RESPIRATION RATE: 17 BRPM

## 2025-01-27 DIAGNOSIS — G43.719 INTRACTABLE CHRONIC MIGRAINE WITHOUT AURA AND WITHOUT STATUS MIGRAINOSUS: Primary | ICD-10-CM

## 2025-01-27 PROCEDURE — 64615 CHEMODENERV MUSC MIGRAINE: CPT | Mod: S$GLB,,, | Performed by: NURSE PRACTITIONER

## 2025-01-27 NOTE — PROCEDURES
Procedures  A time out was conducted just before the start of the procedure to verify the correct patient and procedure, procedure location, and all relevant critical information.      Conventional methods of treatment such as multiple medications, both on and   off label have been tried including: lamotrigine, seroquel, Lyrica, Topamax, desipramine, fluoxetine, Wellbutrin, Celexa, Lexapro, gabapentin     The patient has been unresponsive and refractory.The patient meets criteria for chronic headaches according to the ICHD-II, the patient has more than 15 headaches a month which last for more than 4 hours a day.     Botox session number: 2  Last session was 12 weeks ago and resulted in improvement of: n/a     I am aiming for at least 50%  improvement in the patient's symptoms. Frequency of treatment is every 3 months unless no response to the treatments, at which time we will discontinue the injections.      DESCRIPTION OF PROCEDURE: After obtaining informed consent and under   aseptic technique, a total of 155 units of botulinum toxin type A were   injected in the following muscles:      -- Procerus 5 units  --  5 units bilaterally  -- Frontalis 20 units  -- Temporalis 20 units bilaterally  -- Occipitalis 15 units bilaterally  -- Upper cervical paraspinals 10 units bilaterally  -- Trapezius 15 units bilaterally.      The patient tolerated the procedure well. There were no complications. The patient was given a prescription for repeat treatment in 12 weeks      Unavoidable waste 45 units    SERA Goodman

## 2025-01-29 ENCOUNTER — LAB VISIT (OUTPATIENT)
Dept: LAB | Facility: HOSPITAL | Age: 30
End: 2025-01-29
Attending: ADVANCED PRACTICE MIDWIFE
Payer: COMMERCIAL

## 2025-01-29 ENCOUNTER — PATIENT MESSAGE (OUTPATIENT)
Dept: OBSTETRICS AND GYNECOLOGY | Facility: CLINIC | Age: 30
End: 2025-01-29

## 2025-01-29 ENCOUNTER — ROUTINE PRENATAL (OUTPATIENT)
Dept: OBSTETRICS AND GYNECOLOGY | Facility: CLINIC | Age: 30
End: 2025-01-29
Payer: COMMERCIAL

## 2025-01-29 VITALS
SYSTOLIC BLOOD PRESSURE: 110 MMHG | DIASTOLIC BLOOD PRESSURE: 68 MMHG | WEIGHT: 127.19 LBS | BODY MASS INDEX: 24.04 KG/M2

## 2025-01-29 DIAGNOSIS — G43.719 INTRACTABLE CHRONIC MIGRAINE WITHOUT AURA AND WITHOUT STATUS MIGRAINOSUS: ICD-10-CM

## 2025-01-29 DIAGNOSIS — Z87.42 HISTORY OF VAGINAL SORES: ICD-10-CM

## 2025-01-29 DIAGNOSIS — F31.9 BIPOLAR AFFECTIVE DISORDER, REMISSION STATUS UNSPECIFIED: ICD-10-CM

## 2025-01-29 DIAGNOSIS — Z34.83 ENCOUNTER FOR SUPERVISION OF OTHER NORMAL PREGNANCY IN THIRD TRIMESTER: ICD-10-CM

## 2025-01-29 DIAGNOSIS — Z34.83 ENCOUNTER FOR SUPERVISION OF OTHER NORMAL PREGNANCY IN THIRD TRIMESTER: Primary | ICD-10-CM

## 2025-01-29 DIAGNOSIS — Z34.82 ENCOUNTER FOR SUPERVISION OF OTHER NORMAL PREGNANCY IN SECOND TRIMESTER: ICD-10-CM

## 2025-01-29 LAB
ESTIMATED AVG GLUCOSE: 85 MG/DL (ref 68–131)
HBA1C MFR BLD: 4.6 % (ref 4–5.6)
TREPONEMA PALLIDUM IGG+IGM AB [PRESENCE] IN SERUM OR PLASMA BY IMMUNOASSAY: NONREACTIVE

## 2025-01-29 PROCEDURE — 83036 HEMOGLOBIN GLYCOSYLATED A1C: CPT | Performed by: ADVANCED PRACTICE MIDWIFE

## 2025-01-29 PROCEDURE — 87389 HIV-1 AG W/HIV-1&-2 AB AG IA: CPT | Performed by: ADVANCED PRACTICE MIDWIFE

## 2025-01-29 PROCEDURE — 36415 COLL VENOUS BLD VENIPUNCTURE: CPT | Mod: PO | Performed by: ADVANCED PRACTICE MIDWIFE

## 2025-01-29 PROCEDURE — 99999 PR PBB SHADOW E&M-EST. PATIENT-LVL III: CPT | Mod: PBBFAC,,, | Performed by: ADVANCED PRACTICE MIDWIFE

## 2025-01-29 PROCEDURE — 85025 COMPLETE CBC W/AUTO DIFF WBC: CPT | Performed by: ADVANCED PRACTICE MIDWIFE

## 2025-01-29 PROCEDURE — 86790 VIRUS ANTIBODY NOS: CPT | Mod: 91 | Performed by: ADVANCED PRACTICE MIDWIFE

## 2025-01-29 PROCEDURE — 86695 HERPES SIMPLEX TYPE 1 TEST: CPT | Performed by: ADVANCED PRACTICE MIDWIFE

## 2025-01-29 PROCEDURE — 86593 SYPHILIS TEST NON-TREP QUANT: CPT | Performed by: ADVANCED PRACTICE MIDWIFE

## 2025-01-29 PROCEDURE — 0502F SUBSEQUENT PRENATAL CARE: CPT | Mod: S$GLB,,, | Performed by: ADVANCED PRACTICE MIDWIFE

## 2025-01-29 NOTE — PROGRESS NOTES
29 y.o. female  at 28w2d   Reports + FM, denies VB, LOF or CTX  Doing ok but hasn't been sleeping well, advised talking with psych provider to discuss changing medication  /68   Wt 57.7 kg (127 lb 3.3 oz)   LMP 07/15/2024 (Approximate)   BMI 24.04 kg/m²   TW lbs   28wk labs today (A POS), drank a sugary coffee prior to visit, will draw alternative labs  Tdap reviewed with pt, will offer at nv  Will start visits every 2 weeks    1. Encounter for supervision of other normal pregnancy in third trimester  -     Hemoglobin A1C; Future; Expected date: 2025    2. Bipolar affective disorder, remission status unspecified  Overview:  Seeing Tiffanie Galvez at [facility]  Lamictal and seroquel      3. Intractable chronic migraine without aura and without status migrainosus  Overview:  Pt trying magnesium oil for relief  Seeing neuro, botox injections are the only thing that helps, got them last pregnancy but having insurance issues with this pregnancy   Fioricet not helping        4. History of vaginal sores  -     HERPES SIMPLEX 1&2 IGG; Future; Expected date: 2025  -     HERPESVIRUS-6 HUMAN IGG AND IGM ANTIBODIES; Future; Expected date: 2025  Pt reports having sore on vagina that comes and goes, will add labs with 28wk labs today       Reviewed warning signs, normal FKCs,  labor precautions and how/when to call.  RTC x 2 wks, call or present sooner prn.

## 2025-01-30 DIAGNOSIS — B37.31 VAGINA, CANDIDIASIS: Primary | ICD-10-CM

## 2025-01-30 LAB
BASOPHILS # BLD AUTO: 0.07 K/UL (ref 0–0.2)
BASOPHILS NFR BLD: 0.7 % (ref 0–1.9)
DIFFERENTIAL METHOD BLD: ABNORMAL
EOSINOPHIL # BLD AUTO: 0.1 K/UL (ref 0–0.5)
EOSINOPHIL NFR BLD: 0.7 % (ref 0–8)
ERYTHROCYTE [DISTWIDTH] IN BLOOD BY AUTOMATED COUNT: 12.8 % (ref 11.5–14.5)
HCT VFR BLD AUTO: 33.8 % (ref 37–48.5)
HGB BLD-MCNC: 10.7 G/DL (ref 12–16)
HIV 1+2 AB+HIV1 P24 AG SERPL QL IA: NORMAL
HSV1 IGG SERPL QL IA: POSITIVE
HSV2 IGG SERPL QL IA: NEGATIVE
IMM GRANULOCYTES # BLD AUTO: 0.04 K/UL (ref 0–0.04)
IMM GRANULOCYTES NFR BLD AUTO: 0.4 % (ref 0–0.5)
LYMPHOCYTES # BLD AUTO: 2.5 K/UL (ref 1–4.8)
LYMPHOCYTES NFR BLD: 24.7 % (ref 18–48)
MCH RBC QN AUTO: 31.8 PG (ref 27–31)
MCHC RBC AUTO-ENTMCNC: 31.7 G/DL (ref 32–36)
MCV RBC AUTO: 100 FL (ref 82–98)
MONOCYTES # BLD AUTO: 0.8 K/UL (ref 0.3–1)
MONOCYTES NFR BLD: 8.1 % (ref 4–15)
NEUTROPHILS # BLD AUTO: 6.6 K/UL (ref 1.8–7.7)
NEUTROPHILS NFR BLD: 65.4 % (ref 38–73)
NRBC BLD-RTO: 0 /100 WBC
PLATELET # BLD AUTO: 254 K/UL (ref 150–450)
PMV BLD AUTO: 11.1 FL (ref 9.2–12.9)
RBC # BLD AUTO: 3.37 M/UL (ref 4–5.4)
WBC # BLD AUTO: 10.04 K/UL (ref 3.9–12.7)

## 2025-01-30 RX ORDER — FLUCONAZOLE 100 MG/1
100 TABLET ORAL DAILY
Qty: 1 TABLET | Refills: 0 | Status: SHIPPED | OUTPATIENT
Start: 2025-01-30 | End: 2025-01-31

## 2025-02-04 LAB
HHV6 IGG TITR SER IF: ABNORMAL {TITER}
HHV6 IGG+IGM SER-IMP: ABNORMAL
HHV6 IGM TITR SER IF: ABNORMAL {TITER}

## 2025-02-10 ENCOUNTER — PATIENT MESSAGE (OUTPATIENT)
Dept: OTHER | Facility: OTHER | Age: 30
End: 2025-02-10
Payer: COMMERCIAL

## 2025-02-13 ENCOUNTER — ROUTINE PRENATAL (OUTPATIENT)
Dept: OBSTETRICS AND GYNECOLOGY | Facility: CLINIC | Age: 30
End: 2025-02-13
Payer: COMMERCIAL

## 2025-02-13 VITALS
BODY MASS INDEX: 25.08 KG/M2 | WEIGHT: 132.69 LBS | SYSTOLIC BLOOD PRESSURE: 114 MMHG | DIASTOLIC BLOOD PRESSURE: 72 MMHG

## 2025-02-13 DIAGNOSIS — G43.719 INTRACTABLE CHRONIC MIGRAINE WITHOUT AURA AND WITHOUT STATUS MIGRAINOSUS: ICD-10-CM

## 2025-02-13 DIAGNOSIS — Z23 NEED FOR DIPHTHERIA-TETANUS-PERTUSSIS (TDAP) VACCINE: ICD-10-CM

## 2025-02-13 DIAGNOSIS — Z86.19 HISTORY OF PCR DNA POSITIVE FOR HSV1: ICD-10-CM

## 2025-02-13 DIAGNOSIS — R87.610 ASCUS OF CERVIX WITH NEGATIVE HIGH RISK HPV: ICD-10-CM

## 2025-02-13 DIAGNOSIS — Z34.83 ENCOUNTER FOR SUPERVISION OF OTHER NORMAL PREGNANCY IN THIRD TRIMESTER: Primary | ICD-10-CM

## 2025-02-13 DIAGNOSIS — F31.9 BIPOLAR AFFECTIVE DISORDER, REMISSION STATUS UNSPECIFIED: ICD-10-CM

## 2025-02-13 PROCEDURE — 90471 IMMUNIZATION ADMIN: CPT | Mod: S$GLB,,, | Performed by: OBSTETRICS & GYNECOLOGY

## 2025-02-13 PROCEDURE — 99999 PR PBB SHADOW E&M-EST. PATIENT-LVL III: CPT | Mod: PBBFAC,,, | Performed by: ADVANCED PRACTICE MIDWIFE

## 2025-02-13 PROCEDURE — 0502F SUBSEQUENT PRENATAL CARE: CPT | Mod: S$GLB,,, | Performed by: ADVANCED PRACTICE MIDWIFE

## 2025-02-13 PROCEDURE — 90715 TDAP VACCINE 7 YRS/> IM: CPT | Mod: S$GLB,,, | Performed by: OBSTETRICS & GYNECOLOGY

## 2025-02-13 NOTE — PROGRESS NOTES
29 y.o. female  at 30w3d   Reports + FM, denies VB, LOF or CTX  Doing well without concerns, has appointment with neurology  for next botox  /72   Wt 60.2 kg (132 lb 11.5 oz)   LMP 07/15/2024 (Approximate)   BMI 25.08 kg/m²   TW lbs   Reviewed 28wk lab results (A POS)  Anemia, slight, increasing in her diet  A1C WNL  Tdap today    1. Encounter for supervision of other normal pregnancy in third trimester    2. ASCUS of cervix with negative high risk HPV  Overview:  Retest 3 years      3. Bipolar affective disorder, remission status unspecified  Overview:  Seeing Tiffanie Galvez at [facility]  Lamictal and seroquel      4. Intractable chronic migraine without aura and without status migrainosus  Overview:  Pt trying magnesium oil for relief  Seeing neuro, botox injections are the only thing that helps, got them last pregnancy but having insurance issues with this pregnancy   Fioricet not helping        5. Need for diphtheria-tetanus-pertussis (Tdap) vaccine  -     Tdap (BOOSTRIX) vaccine injection 0.5 mL    6. HSV1  Overview:  Valtrex at 36 wks, pt aware           Reviewed warning signs, normal FKCs,  labor precautions and how/when to call.  RTC x 2 wks, call or present sooner prn.

## 2025-02-24 ENCOUNTER — PATIENT MESSAGE (OUTPATIENT)
Dept: OTHER | Facility: OTHER | Age: 30
End: 2025-02-24
Payer: COMMERCIAL

## 2025-02-26 ENCOUNTER — ROUTINE PRENATAL (OUTPATIENT)
Dept: OBSTETRICS AND GYNECOLOGY | Facility: CLINIC | Age: 30
End: 2025-02-26
Payer: COMMERCIAL

## 2025-02-26 VITALS
DIASTOLIC BLOOD PRESSURE: 70 MMHG | SYSTOLIC BLOOD PRESSURE: 126 MMHG | WEIGHT: 136.88 LBS | BODY MASS INDEX: 25.87 KG/M2

## 2025-02-26 DIAGNOSIS — Z34.83 ENCOUNTER FOR SUPERVISION OF OTHER NORMAL PREGNANCY IN THIRD TRIMESTER: Primary | ICD-10-CM

## 2025-02-26 DIAGNOSIS — Z36.89 ENCOUNTER FOR ULTRASOUND TO ASSESS FETAL GROWTH: ICD-10-CM

## 2025-02-26 DIAGNOSIS — G43.719 INTRACTABLE CHRONIC MIGRAINE WITHOUT AURA AND WITHOUT STATUS MIGRAINOSUS: ICD-10-CM

## 2025-02-26 DIAGNOSIS — F31.9 BIPOLAR AFFECTIVE DISORDER, REMISSION STATUS UNSPECIFIED: ICD-10-CM

## 2025-02-26 DIAGNOSIS — Z86.19 HISTORY OF PCR DNA POSITIVE FOR HSV1: ICD-10-CM

## 2025-02-26 DIAGNOSIS — F90.0 ATTENTION DEFICIT HYPERACTIVITY DISORDER (ADHD), PREDOMINANTLY INATTENTIVE TYPE: ICD-10-CM

## 2025-02-26 PROCEDURE — 99999 PR PBB SHADOW E&M-EST. PATIENT-LVL II: CPT | Mod: PBBFAC,,, | Performed by: ADVANCED PRACTICE MIDWIFE

## 2025-02-26 PROCEDURE — 0502F SUBSEQUENT PRENATAL CARE: CPT | Mod: S$GLB,,, | Performed by: ADVANCED PRACTICE MIDWIFE

## 2025-02-26 NOTE — PROGRESS NOTES
29 y.o. female  at 32w2d   Reports + FM, denies VB, LOF or CTX  Doing well without concerns, ready for baby   /70   Wt 62.1 kg (136 lb 14.5 oz)   LMP 07/15/2024 (Approximate)   BMI 25.87 kg/m²   TW lbs   Tdap given at last visit    1. Encounter for supervision of other normal pregnancy in third trimester    2. HSV1  Overview:  Valtrex at 36 wks, pt aware      3. Attention deficit hyperactivity disorder (ADHD), predominantly inattentive type  Overview:  No meds      4. Bipolar affective disorder, remission status unspecified  Overview:  Seeing Tiffanie Galvez at [facility]  Lamictal and seroquel      5. Intractable chronic migraine without aura and without status migrainosus  Overview:  Pt trying magnesium oil for relief  Seeing neuro, botox injections are the only thing that helps, got them last pregnancy but having insurance issues with this pregnancy   Fioricet not helping        6. Encounter for ultrasound to assess fetal growth  -     US OB/GYN Procedure ([facility])-Future; Future         Reviewed warning signs, normal FKCs,  labor precautions and how/when to call.  RTC x 2 wks, call or present sooner prn.

## 2025-03-05 ENCOUNTER — PATIENT MESSAGE (OUTPATIENT)
Dept: PSYCHIATRY | Facility: CLINIC | Age: 30
End: 2025-03-05
Payer: COMMERCIAL

## 2025-03-05 ENCOUNTER — PATIENT MESSAGE (OUTPATIENT)
Dept: PRIMARY CARE CLINIC | Facility: CLINIC | Age: 30
End: 2025-03-05
Payer: COMMERCIAL

## 2025-03-05 DIAGNOSIS — O99.341 BIPOLAR DISEASE DURING PREGNANCY IN FIRST TRIMESTER: ICD-10-CM

## 2025-03-05 DIAGNOSIS — F31.9 BIPOLAR DISEASE DURING PREGNANCY IN FIRST TRIMESTER: ICD-10-CM

## 2025-03-05 DIAGNOSIS — F31.30 BIPOLAR I DISORDER, MOST RECENT EPISODE (OR CURRENT) DEPRESSED: ICD-10-CM

## 2025-03-05 RX ORDER — LAMOTRIGINE 200 MG/1
200 TABLET ORAL EVERY MORNING
Qty: 30 TABLET | Refills: 3 | Status: CANCELLED | OUTPATIENT
Start: 2025-03-05 | End: 2025-07-03

## 2025-03-05 RX ORDER — QUETIAPINE FUMARATE 200 MG/1
200 TABLET, FILM COATED ORAL NIGHTLY
Qty: 30 TABLET | Refills: 5 | Status: CANCELLED | OUTPATIENT
Start: 2025-03-05 | End: 2025-09-01

## 2025-03-07 ENCOUNTER — OFFICE VISIT (OUTPATIENT)
Dept: PSYCHIATRY | Facility: CLINIC | Age: 30
End: 2025-03-07
Payer: COMMERCIAL

## 2025-03-07 DIAGNOSIS — O99.343 BIPOLAR DISEASE DURING PREGNANCY IN THIRD TRIMESTER: Primary | ICD-10-CM

## 2025-03-07 DIAGNOSIS — F31.9 BIPOLAR DISEASE DURING PREGNANCY IN THIRD TRIMESTER: Primary | ICD-10-CM

## 2025-03-07 DIAGNOSIS — F90.8 OTHER SPECIFIED ATTENTION DEFICIT HYPERACTIVITY DISORDER (ADHD): ICD-10-CM

## 2025-03-07 RX ORDER — LAMOTRIGINE 200 MG/1
200 TABLET ORAL EVERY MORNING
Qty: 30 TABLET | Refills: 2 | Status: SHIPPED | OUTPATIENT
Start: 2025-03-07 | End: 2025-06-05

## 2025-03-07 RX ORDER — QUETIAPINE FUMARATE 200 MG/1
200 TABLET, FILM COATED ORAL NIGHTLY
Qty: 30 TABLET | Refills: 2 | Status: SHIPPED | OUTPATIENT
Start: 2025-03-07 | End: 2025-06-05

## 2025-03-07 NOTE — PROGRESS NOTES
Outpatient Psychiatry Follow-Up Visit    3/7/2025    Virtual Visit    The patient location is: Patient's home/ Patient reported that his/her location at the time of this visit was in the [facility]     Visit type: Virtual visit with synchronous audio and video     Each patient to whom he or she provides medical services by telehealth is: (1) informed of the relationship between the medical psychologist and patient and the respective role of any other health care provider with respect to management of the patient; and (2) notified that he or she may decline to receive medical services by telehealth and may withdraw from such care at any time.    I also informed patient of the following:   Tiffanie Galvez, PhD, MPAP:  [place] medical license number: [id number]    My contact info:  [facility] / 2nd Floor  [address] [place] [address]   Ph: [phone]    If technology issues, call office phone: Ph: [phone] or [phone]  If crisis: Dial 911 or go to nearest Emergency Room (ER)  If questions related to privacy practices: contact [facility] Information Department: [phone]    Preferred Name: Iris  Gender Identity: cis female  Preferred Pronouns: she/her      History of Present Illness    CHIEF COMPLAINT:  Iris, a 33-week pregnant woman, presents for follow-up of bipolar disorder, ADHD, sleep issues, and pregnancy-related concerns. The practitioner last saw the patient in October.    HPI:  Iris reports significant sleep disturbances, including difficulty sleeping for more than an hour at a time for weeks, with vivid, distressing nightmares involving themes of being chased, tortured, raped, or killed. These dreams leave her feeling exhausted upon waking, with leg pain as if she had been running all night.    Iris exhibits signs of heightened anxiety, as evidenced by her nightmares and overall stress levels. She expresses anger about the pregnancy  and significant financial stress, including medical bills, increased house insurance costs, and the need for a new vehicle to accommodate a car seat.    At 33 weeks pregnant, the patient reports increasing pain and decreased mobility. She experiences sciatica pain and pressure on her lower back and hips, which affects her ability to keep up with her toddler son.    Iris continues to take Lamotrigine 200 mg and Seroquel 200 mg but still struggles with sleep issues. She expresses a desire to resume taking Adderall after giving birth. Iris has suspended therapy sessions due to difficulty focusing without Adderall during pregnancy.    The sleep disturbances and pregnancy-related discomfort significantly impact the patient's daily functioning. She struggles to keep up with her active toddler, relying on containment and bribery to manage her son's behavior due to her limited mobility.    Iris and her midwife have agreed to try inducing labor early, aiming for early April instead of the original due date of April 21st. Iris expresses a strong desire to breastfeed.    MEDICATIONS:  Iris is on Lamotrigine 200 mg and Seroquel 200 mg. She has discontinued Adderall due to pregnancy.    SURGICAL HISTORY:  Iris's partner, Roddy, has an upcoming surgery scheduled for the second week of April 2025. The indication for the surgery is not specified.    LIFESTYLE:  Iris is currently unable to work due to her pregnancy. She lives in a house with her  Roddy and their child Katherine, who is not yet a year and a half old. They are expecting another baby soon. As a form of self-care, she takes baths every couple of days. Iris's relationship with Roddy appears strained due to financial stress and the pregnancy. Due to pregnancy-related physical limitations, the patient is experiencing difficulty keeping up with her active young child.      ROS:  Musculoskeletal: -joint pain, +muscle weakness  Psychiatric: +sleep difficulty        PSYCHIATRIC: Pertinant items are noted in the narrative.    Past Medical, Family and Social History: The patient's past medical, family and social history have been reviewed and updated as appropriate within the electronic medical record - see encounter notes.     since October 2024.            3/7/2025     8:22 AM 10/3/2024     9:52 AM 4/15/2024     9:54 AM   GAD7   1. Feeling nervous, anxious, or on edge? 2 3 1   2. Not being able to stop or control worrying? 2 2 1   3. Worrying too much about different things? 2 2 1   4. Trouble relaxing? 3 3 1   5. Being so restless that it is hard to sit still? 3 3 1   6. Becoming easily annoyed or irritable? 3 3 1   7. Feeling afraid as if something awful might happen? 1 2 1   8. If you checked off any problems, how difficult have these problems made it for you to do your work, take care of things at home, or get along with other people? 2 2    NOLVIA-7 Score 16  18  7       Patient-reported      0-4 = Minimal anxiety  5-9 = Mild anxiety  10-14 = Moderate anxiety  15-21 = Severe anxiety         8/1/2022     9:05 AM   Depression Patient Health Questionnaire   Over the last two weeks how often have you been bothered by little interest or pleasure in doing things Several days    Over the last two weeks how often have you been bothered by feeling down, depressed or hopeless Several days   PHQ-2 Total Score 2       Data saved with a previous flowsheet row definition     0-4 = No intervention  5 to 9 = Mild  10 to 14 = Moderate  15 to 19 = Moderately severe  =20 = Severe    Risk Parameters:  Patient reports no suicidal ideation  Patient reports no homicidal ideation  Patient reports no self-injurious behavior  Patient reports no violent behavior    Exam (detailed: at least 9 elements; comprehensive: all 15 elements)   Constitutional  Vitals:  Most recent vital signs were reviewed.   Last 3 sets of Vitals        1/29/2025    10:55 AM 2/13/2025    11:31 AM 2/26/2025    11:37 AM    Vitals - 1 value per visit   SYSTOLIC 110 114 126   DIASTOLIC 68 72 70   Weight (lb) 127.21 132.72 136.91   Weight (kg) 57.7 60.2 62.1          General:  age appropriate, casually dressed, dark circles under eyes     Musculoskeletal  Muscle Strength/Tone:  no tremor, no tic   Gait & Station:  video visit     Psychiatric  Speech:  no latency; no press   Behavior: In bed   Mood & Affect:  anxious, tired  congruent and appropriate   Thought Process:  normal and logical   Associations:  intact   Thought Content:  normal, no suicidality, no homicidality, delusions, or paranoia   Insight:  has awareness of illness   Judgement: behavior is adequate to circumstances   Orientation:  grossly intact   Memory: intact for content of interview   Language: grossly intact   Attention Span & Concentration:  Grossly intact   Fund of Knowledge:  intact and appropriate to age and level of education     General Impression:     Encounter Diagnoses   Name Primary?    Bipolar disease during pregnancy in third trimester Yes    Other specified attention deficit hyperactivity disorder (ADHD)        Assessment & Plan    - Assessed sleep issues and anxiety during pregnancy  - Evaluated impact of current medications (Lamotrigine 200mg, Seroquel 200mg) on sleep  - Considered need for therapy, noting patient's difficulty focusing without Adderall  - Discussed potential for restarting Adderall postpartum, considering impact on sleep  - Recommend cognitive techniques for managing nightmares  - Will consult with pediatrician regarding stimulant use while breastfeeding    ANXIETY DISORDER:  - Explained the relationship between anxiety and recurring nightmares.  - Iris to practice cognitive techniques to overcome nightmares:  - When awake, imagine empowering solutions to dream scenarios.  - Visualize overcoming threats or challenges in dreams.    BIPOLAR DISORDER:  - Continued Lamotrigine 200mg and Seroquel 200mg.  - Refilled both medications at  [facility].    OTHER:  - Discussed the importance of self-care during pregnancy.  - Explained that bottle feeding is acceptable if breastfeeding is not feasible.  - Recommend incorporating self-care activities into daily routine.    FOLLOW-UP:  - Follow up in early June (about 3 months from now).  - Contact the office if any issues arise before the scheduled appointment.         I spent an additional 11 minutes performing E/M services with >50% spent on counseling, guidance, coordinating care (not Psychotherapy related) in addition to the 16 minutes performing Psychotherapy.    I spent a total of 27 minutes on the day of the visit. This includes face to face time and non-face to face time preparing to see the patient (eg, review of tests), obtaining and/or reviewing separately obtained history, documenting clinical information in the electronic or other health record, independently interpreting results and communicating results to the patient/family/caregiver, or care coordinator.        Tiffanie Galvez, PhD, MP  Advanced Practice Medical Psychologist  [facility] [address]  [place] [address]  [phone]   [phone] fax    This note was generated with the assistance of ambient listening technology. Verbal consent was obtained by the patient and accompanying visitor(s) for the recording of patient appointment to facilitate this note. I attest to having reviewed and edited the generated note for accuracy, though some syntax or spelling errors may persist. Please contact the author of this note for any clarification.

## 2025-03-07 NOTE — PATIENT INSTRUCTIONS

## 2025-03-12 ENCOUNTER — PATIENT MESSAGE (OUTPATIENT)
Dept: OBSTETRICS AND GYNECOLOGY | Facility: CLINIC | Age: 30
End: 2025-03-12

## 2025-03-12 ENCOUNTER — ROUTINE PRENATAL (OUTPATIENT)
Dept: OBSTETRICS AND GYNECOLOGY | Facility: CLINIC | Age: 30
End: 2025-03-12
Payer: COMMERCIAL

## 2025-03-12 VITALS
SYSTOLIC BLOOD PRESSURE: 112 MMHG | DIASTOLIC BLOOD PRESSURE: 60 MMHG | BODY MASS INDEX: 25.45 KG/M2 | WEIGHT: 134.69 LBS

## 2025-03-12 DIAGNOSIS — G43.719 INTRACTABLE CHRONIC MIGRAINE WITHOUT AURA AND WITHOUT STATUS MIGRAINOSUS: ICD-10-CM

## 2025-03-12 DIAGNOSIS — N76.0 BV (BACTERIAL VAGINOSIS): ICD-10-CM

## 2025-03-12 DIAGNOSIS — B96.89 BV (BACTERIAL VAGINOSIS): ICD-10-CM

## 2025-03-12 DIAGNOSIS — F31.9 BIPOLAR AFFECTIVE DISORDER, REMISSION STATUS UNSPECIFIED: ICD-10-CM

## 2025-03-12 DIAGNOSIS — Z34.83 ENCOUNTER FOR SUPERVISION OF OTHER NORMAL PREGNANCY IN THIRD TRIMESTER: Primary | ICD-10-CM

## 2025-03-12 DIAGNOSIS — Z86.19 HISTORY OF PCR DNA POSITIVE FOR HSV1: ICD-10-CM

## 2025-03-12 PROCEDURE — 87210 SMEAR WET MOUNT SALINE/INK: CPT | Mod: QW,S$GLB,, | Performed by: ADVANCED PRACTICE MIDWIFE

## 2025-03-12 PROCEDURE — 99999 PR PBB SHADOW E&M-EST. PATIENT-LVL II: CPT | Mod: PBBFAC,,, | Performed by: ADVANCED PRACTICE MIDWIFE

## 2025-03-12 PROCEDURE — 0502F SUBSEQUENT PRENATAL CARE: CPT | Mod: S$GLB,,, | Performed by: ADVANCED PRACTICE MIDWIFE

## 2025-03-12 RX ORDER — VALACYCLOVIR HYDROCHLORIDE 500 MG/1
500 TABLET, FILM COATED ORAL 2 TIMES DAILY
Qty: 60 TABLET | Refills: 0 | Status: SHIPPED | OUTPATIENT
Start: 2025-03-12 | End: 2025-04-11

## 2025-03-12 RX ORDER — METRONIDAZOLE 500 MG/1
500 TABLET ORAL 2 TIMES DAILY
Qty: 14 TABLET | Refills: 0 | Status: SHIPPED | OUTPATIENT
Start: 2025-03-12 | End: 2025-03-19

## 2025-03-12 NOTE — PROGRESS NOTES
29 y.o. female  at 34w2d  Reports + FM, denies VB, LOF or regular CTX  Doing well  Reporting vaginal irritation and redness   /60   Wt 61.1 kg (134 lb 11.2 oz)   LMP 07/15/2024 (Approximate)   BMI 25.45 kg/m²   TW lbs   GBS discussed and handout attached to AVS  Growth US scheduled for next visit    1. Encounter for supervision of other normal pregnancy in third trimester    2. HSV1  Overview:  Valtrex at 36 wks, pt aware      3. BV (bacterial vaginosis)  -     metroNIDAZOLE (FLAGYL) 500 MG tablet; Take 1 tablet (500 mg total) by mouth 2 (two) times daily. for 7 days  Dispense: 14 tablet; Refill: 0    4. Intractable chronic migraine without aura and without status migrainosus  Overview:  Pt trying magnesium oil for relief  Seeing neuro, botox injections are the only thing that helps, got them last pregnancy but having insurance issues with this pregnancy   Fioricet not helping        5. Bipolar affective disorder, remission status unspecified  Overview:  Seeing Tiffanie Galvez at [facility] Lamictal and seroquel      Other orders  -     valACYclovir (VALTREX) 500 MG tablet; Take 1 tablet (500 mg total) by mouth 2 (two) times daily.  Dispense: 60 tablet; Refill: 0       +clue cells on wet prep, rx sent and explained how to take  Reviewed warning signs, normal FKCs, labor precautions and how/when to call.  RTC x 2 wk, call or present sooner prn.

## 2025-03-17 ENCOUNTER — PATIENT MESSAGE (OUTPATIENT)
Dept: OTHER | Facility: OTHER | Age: 30
End: 2025-03-17
Payer: COMMERCIAL

## 2025-03-19 ENCOUNTER — PATIENT MESSAGE (OUTPATIENT)
Dept: OBSTETRICS AND GYNECOLOGY | Facility: CLINIC | Age: 30
End: 2025-03-19
Payer: COMMERCIAL

## 2025-03-19 ENCOUNTER — PATIENT MESSAGE (OUTPATIENT)
Dept: NEUROLOGY | Facility: CLINIC | Age: 30
End: 2025-03-19
Payer: COMMERCIAL

## 2025-03-19 DIAGNOSIS — G43.719 INTRACTABLE CHRONIC MIGRAINE WITHOUT AURA AND WITHOUT STATUS MIGRAINOSUS: Primary | ICD-10-CM

## 2025-03-19 DIAGNOSIS — B37.31 VAGINAL YEAST INFECTION: Primary | ICD-10-CM

## 2025-03-19 RX ORDER — RIMEGEPANT SULFATE 75 MG/75MG
75 TABLET, ORALLY DISINTEGRATING ORAL DAILY PRN
Qty: 16 TABLET | Refills: 11 | Status: SHIPPED | OUTPATIENT
Start: 2025-03-19

## 2025-03-19 RX ORDER — FLUCONAZOLE 100 MG/1
150 TABLET ORAL ONCE
Qty: 1 TABLET | Refills: 0 | Status: SHIPPED | OUTPATIENT
Start: 2025-03-19 | End: 2025-03-19

## 2025-03-27 ENCOUNTER — ROUTINE PRENATAL (OUTPATIENT)
Dept: OBSTETRICS AND GYNECOLOGY | Facility: CLINIC | Age: 30
End: 2025-03-27
Payer: COMMERCIAL

## 2025-03-27 ENCOUNTER — PROCEDURE VISIT (OUTPATIENT)
Dept: OBSTETRICS AND GYNECOLOGY | Facility: CLINIC | Age: 30
End: 2025-03-27
Payer: COMMERCIAL

## 2025-03-27 VITALS
BODY MASS INDEX: 25.91 KG/M2 | WEIGHT: 137.13 LBS | DIASTOLIC BLOOD PRESSURE: 70 MMHG | SYSTOLIC BLOOD PRESSURE: 118 MMHG

## 2025-03-27 DIAGNOSIS — Z86.19 HISTORY OF PCR DNA POSITIVE FOR HSV1: ICD-10-CM

## 2025-03-27 DIAGNOSIS — Z34.83 ENCOUNTER FOR SUPERVISION OF OTHER NORMAL PREGNANCY IN THIRD TRIMESTER: Primary | ICD-10-CM

## 2025-03-27 DIAGNOSIS — F31.9 BIPOLAR AFFECTIVE DISORDER, REMISSION STATUS UNSPECIFIED: ICD-10-CM

## 2025-03-27 DIAGNOSIS — Z36.89 ENCOUNTER FOR ULTRASOUND TO ASSESS FETAL GROWTH: ICD-10-CM

## 2025-03-27 DIAGNOSIS — G43.719 INTRACTABLE CHRONIC MIGRAINE WITHOUT AURA AND WITHOUT STATUS MIGRAINOSUS: ICD-10-CM

## 2025-03-27 PROCEDURE — 99999 PR PBB SHADOW E&M-EST. PATIENT-LVL III: CPT | Mod: PBBFAC,,, | Performed by: ADVANCED PRACTICE MIDWIFE

## 2025-03-27 PROCEDURE — 76816 OB US FOLLOW-UP PER FETUS: CPT | Mod: S$GLB,,, | Performed by: OBSTETRICS & GYNECOLOGY

## 2025-03-27 PROCEDURE — 0502F SUBSEQUENT PRENATAL CARE: CPT | Mod: S$GLB,,, | Performed by: ADVANCED PRACTICE MIDWIFE

## 2025-03-27 NOTE — PROGRESS NOTES
29 y.o. female  at 36w3d  Reports + FM, denies VB, LOF or regular CTX  Doing well, having some back and groin pain   /70   Wt 62.2 kg (137 lb 2 oz)   LMP 07/15/2024 (Approximate)   BMI 25.91 kg/m²   TW lbs   GBS collected today   The skin of the suprapubic region was evaluated and appears clean, dry and intact.    VE per pt request   US today with cephalic presentation, anterior placenta, MVP 6.7cm, EFW 22%, 5lb 12oz, AC 39%, reviewed with pt and     1. Encounter for supervision of other normal pregnancy in third trimester  -     Group B Streptococcus, PCR    2. Intractable chronic migraine without aura and without status migrainosus  Overview:  Pt trying magnesium oil for relief  Seeing neuro, botox injections are the only thing that helps, got them last pregnancy but having insurance issues with this pregnancy   Fioricet not helping        3. Bipolar affective disorder, remission status unspecified  Overview:  Seeing Tiffanie Galvez at [facility]  Lamictal and seroquel      4. HSV1  Overview:  Valtrex at 36 wks, pt aware           Reviewed warning signs, normal FKCs, labor precautions and how/when to call.  RTC x 1 wk, call or present sooner prn.

## 2025-04-03 ENCOUNTER — ROUTINE PRENATAL (OUTPATIENT)
Dept: OBSTETRICS AND GYNECOLOGY | Facility: CLINIC | Age: 30
End: 2025-04-03
Payer: COMMERCIAL

## 2025-04-03 VITALS — DIASTOLIC BLOOD PRESSURE: 62 MMHG | SYSTOLIC BLOOD PRESSURE: 137 MMHG | WEIGHT: 133 LBS | BODY MASS INDEX: 25.13 KG/M2

## 2025-04-03 DIAGNOSIS — F31.9 BIPOLAR AFFECTIVE DISORDER, REMISSION STATUS UNSPECIFIED: ICD-10-CM

## 2025-04-03 DIAGNOSIS — G43.719 INTRACTABLE CHRONIC MIGRAINE WITHOUT AURA AND WITHOUT STATUS MIGRAINOSUS: ICD-10-CM

## 2025-04-03 DIAGNOSIS — Z34.83 ENCOUNTER FOR SUPERVISION OF OTHER NORMAL PREGNANCY IN THIRD TRIMESTER: Primary | ICD-10-CM

## 2025-04-03 DIAGNOSIS — Z86.19 HISTORY OF PCR DNA POSITIVE FOR HSV1: ICD-10-CM

## 2025-04-03 PROCEDURE — 87653 STREP B DNA AMP PROBE: CPT | Performed by: ADVANCED PRACTICE MIDWIFE

## 2025-04-03 PROCEDURE — 0502F SUBSEQUENT PRENATAL CARE: CPT | Mod: S$GLB,,, | Performed by: ADVANCED PRACTICE MIDWIFE

## 2025-04-03 PROCEDURE — 99999 PR PBB SHADOW E&M-EST. PATIENT-LVL III: CPT | Mod: PBBFAC,,, | Performed by: ADVANCED PRACTICE MIDWIFE

## 2025-04-03 NOTE — PROGRESS NOTES
29 y.o. female  at 37w3d   Reports + FM, denies VB, LOF or regular CTX  Doing ok, having pelvic pain/pressure, some BHCs, mucous discharge  /62   Wt 60.3 kg (133 lb)   LMP 07/15/2024 (Approximate)   BMI 25.13 kg/m²   TW lbs     1. Encounter for supervision of other normal pregnancy in third trimester  -     Group B Streptococcus, PCR    2. HSV1  Overview:  Valtrex at 36 wks, pt aware      3. Bipolar affective disorder, remission status unspecified  Overview:  Seeing Tiffanie Galvez at [facility]  Lamictal and seroquel      4. Intractable chronic migraine without aura and without status migrainosus  Overview:  Pt trying magnesium oil for relief  Seeing neuro, botox injections are the only thing that helps, got them last pregnancy but having insurance issues with this pregnancy   Fioricet not helping           VE per pt request  GBS remains pending, lab called and unable to find solution, GBS redrawn today  Reviewed warning signs, normal FKCs, labor precautions and how/when to call.  RTC x 1 wk, call or present sooner prn.

## 2025-04-05 LAB — GROUP B STREPTOCOCCUS, PCR (OHS): NEGATIVE

## 2025-04-10 ENCOUNTER — ROUTINE PRENATAL (OUTPATIENT)
Dept: OBSTETRICS AND GYNECOLOGY | Facility: CLINIC | Age: 30
End: 2025-04-10
Payer: COMMERCIAL

## 2025-04-10 VITALS
BODY MASS INDEX: 26.62 KG/M2 | DIASTOLIC BLOOD PRESSURE: 65 MMHG | SYSTOLIC BLOOD PRESSURE: 113 MMHG | WEIGHT: 140.88 LBS

## 2025-04-10 DIAGNOSIS — F31.9 BIPOLAR AFFECTIVE DISORDER, REMISSION STATUS UNSPECIFIED: ICD-10-CM

## 2025-04-10 DIAGNOSIS — Z34.83 ENCOUNTER FOR SUPERVISION OF OTHER NORMAL PREGNANCY IN THIRD TRIMESTER: Primary | ICD-10-CM

## 2025-04-10 DIAGNOSIS — F90.0 ATTENTION DEFICIT HYPERACTIVITY DISORDER (ADHD), PREDOMINANTLY INATTENTIVE TYPE: ICD-10-CM

## 2025-04-10 DIAGNOSIS — Z86.19 HISTORY OF PCR DNA POSITIVE FOR HSV1: ICD-10-CM

## 2025-04-10 DIAGNOSIS — G43.719 INTRACTABLE CHRONIC MIGRAINE WITHOUT AURA AND WITHOUT STATUS MIGRAINOSUS: ICD-10-CM

## 2025-04-10 PROCEDURE — 0502F SUBSEQUENT PRENATAL CARE: CPT | Mod: S$GLB,,, | Performed by: ADVANCED PRACTICE MIDWIFE

## 2025-04-10 PROCEDURE — 99999 PR PBB SHADOW E&M-EST. PATIENT-LVL III: CPT | Mod: PBBFAC,,, | Performed by: ADVANCED PRACTICE MIDWIFE

## 2025-04-14 ENCOUNTER — PATIENT MESSAGE (OUTPATIENT)
Dept: NEUROLOGY | Facility: CLINIC | Age: 30
End: 2025-04-14
Payer: COMMERCIAL

## 2025-04-14 NOTE — PROGRESS NOTES
29 y.o. female  at 38w3d   Reports + FM, denies VB, LOF or regular CTX  Doing well without concerns, ready for baby   /65   Wt 63.9 kg (140 lb 14 oz)   LMP 07/15/2024 (Approximate)   BMI 26.62 kg/m²   TW lbs     1. Encounter for supervision of other normal pregnancy in third trimester    2. Intractable chronic migraine without aura and without status migrainosus  Overview:  Pt trying magnesium oil for relief  Seeing neuro, botox injections are the only thing that helps, got them last pregnancy but having insurance issues with this pregnancy   Fioricet not helping        3. Bipolar affective disorder, remission status unspecified  Overview:  Seeing Tiffanie Galvez at [facility]  Lamictal and seroquel      4. HSV1  Overview:  Valtrex at 36 wks, pt aware      5. Attention deficit hyperactivity disorder (ADHD), predominantly inattentive type  Overview:  No meds           Reviewed warning signs, normal FKCs, labor precautions and how/when to call.  RTC x 1 wk, call or present sooner prn.

## 2025-04-16 ENCOUNTER — PATIENT MESSAGE (OUTPATIENT)
Dept: OBSTETRICS AND GYNECOLOGY | Facility: CLINIC | Age: 30
End: 2025-04-16

## 2025-04-16 ENCOUNTER — ROUTINE PRENATAL (OUTPATIENT)
Dept: OBSTETRICS AND GYNECOLOGY | Facility: CLINIC | Age: 30
End: 2025-04-16
Payer: COMMERCIAL

## 2025-04-16 VITALS
SYSTOLIC BLOOD PRESSURE: 118 MMHG | WEIGHT: 141.56 LBS | DIASTOLIC BLOOD PRESSURE: 72 MMHG | BODY MASS INDEX: 26.74 KG/M2

## 2025-04-16 DIAGNOSIS — F31.9 BIPOLAR AFFECTIVE DISORDER, REMISSION STATUS UNSPECIFIED: ICD-10-CM

## 2025-04-16 DIAGNOSIS — Z86.19 HISTORY OF PCR DNA POSITIVE FOR HSV1: ICD-10-CM

## 2025-04-16 DIAGNOSIS — F90.0 ATTENTION DEFICIT HYPERACTIVITY DISORDER (ADHD), PREDOMINANTLY INATTENTIVE TYPE: ICD-10-CM

## 2025-04-16 DIAGNOSIS — G43.719 INTRACTABLE CHRONIC MIGRAINE WITHOUT AURA AND WITHOUT STATUS MIGRAINOSUS: ICD-10-CM

## 2025-04-16 DIAGNOSIS — Z34.83 ENCOUNTER FOR SUPERVISION OF OTHER NORMAL PREGNANCY IN THIRD TRIMESTER: Primary | ICD-10-CM

## 2025-04-16 PROCEDURE — 0502F SUBSEQUENT PRENATAL CARE: CPT | Mod: S$GLB,,, | Performed by: ADVANCED PRACTICE MIDWIFE

## 2025-04-16 PROCEDURE — 99999 PR PBB SHADOW E&M-EST. PATIENT-LVL II: CPT | Mod: PBBFAC,,, | Performed by: ADVANCED PRACTICE MIDWIFE

## 2025-04-16 NOTE — PROGRESS NOTES
29 y.o. female  at 39w2d   Reports + FM, denies VB, LOF or regular CTX  Doing well without concerns, very ready for baby, was having BHCs yesterday   /72   Wt 64.2 kg (141 lb 8.6 oz)   LMP 07/15/2024 (Approximate)   BMI 26.74 kg/m²   TW lbs     1. Encounter for supervision of other normal pregnancy in third trimester    2. HSV1  Overview:  Valtrex at 36 wks, pt aware      3. Attention deficit hyperactivity disorder (ADHD), predominantly inattentive type  Overview:  No meds      4. Bipolar affective disorder, remission status unspecified  Overview:  Seeing Tiffanie Galvez at [facility]  Lamictal and seroquel      5. Intractable chronic migraine without aura and without status migrainosus  Overview:  Pt trying magnesium oil for relief  Seeing neuro, botox injections are the only thing that helps, got them last pregnancy but having insurance issues with this pregnancy   Fioricet not helping        Missed botox appointment d/t neck pain   VE per pt request with membrane sweep  Reviewed warning signs, normal FKCs, labor precautions and how/when to call.  RTC x 1 wk, call or present sooner prn.

## 2025-04-21 ENCOUNTER — PATIENT MESSAGE (OUTPATIENT)
Dept: OBSTETRICS AND GYNECOLOGY | Facility: CLINIC | Age: 30
End: 2025-04-21
Payer: COMMERCIAL

## 2025-04-23 ENCOUNTER — ROUTINE PRENATAL (OUTPATIENT)
Dept: OBSTETRICS AND GYNECOLOGY | Facility: CLINIC | Age: 30
End: 2025-04-23
Payer: COMMERCIAL

## 2025-04-23 VITALS
DIASTOLIC BLOOD PRESSURE: 62 MMHG | BODY MASS INDEX: 27.41 KG/M2 | WEIGHT: 145.06 LBS | SYSTOLIC BLOOD PRESSURE: 110 MMHG

## 2025-04-23 DIAGNOSIS — Z86.19 HISTORY OF PCR DNA POSITIVE FOR HSV1: ICD-10-CM

## 2025-04-23 DIAGNOSIS — Z34.83 ENCOUNTER FOR SUPERVISION OF OTHER NORMAL PREGNANCY IN THIRD TRIMESTER: ICD-10-CM

## 2025-04-23 DIAGNOSIS — G43.719 INTRACTABLE CHRONIC MIGRAINE WITHOUT AURA AND WITHOUT STATUS MIGRAINOSUS: ICD-10-CM

## 2025-04-23 DIAGNOSIS — O48.0 POST-TERM PREGNANCY, 40-42 WEEKS OF GESTATION: Primary | ICD-10-CM

## 2025-04-23 PROCEDURE — 99999 PR PBB SHADOW E&M-EST. PATIENT-LVL III: CPT | Mod: PBBFAC,,, | Performed by: ADVANCED PRACTICE MIDWIFE

## 2025-04-23 RX ORDER — FLUCONAZOLE 100 MG/1
100 TABLET ORAL DAILY
Qty: 1 TABLET | Refills: 0 | Status: SHIPPED | OUTPATIENT
Start: 2025-04-23 | End: 2025-04-24

## 2025-04-23 NOTE — PROGRESS NOTES
30 y.o. female  at 40w2d   Reports + FM, denies VB, LOF or regular CTX  Doing well without concerns, VERY VERY ready for baby   /62   Wt 65.8 kg (145 lb 1 oz)   LMP 07/15/2024 (Approximate)   BMI 27.41 kg/m²   TW lbs       1. Post-term pregnancy, 40-42 weeks of gestation  -     US OB/GYN Procedure ([facility])-Future; Future    2. Intractable chronic migraine without aura and without status migrainosus  Overview:  Pt trying magnesium oil for relief  Seeing neuro, botox injections are the only thing that helps, got them last pregnancy but having insurance issues with this pregnancy   Fioricet not helping        3. HSV1  Overview:  Valtrex at 36 wks, pt aware      4. Encounter for supervision of other normal pregnancy in third trimester       VE declined at this time, has yeast infection and will  medication at pharmacy downstairs  Discussed postdates management and IOL - she prefers to await spontaneous labor if possible   BPPs discussed and ordered  Will plan to schedule IOL if still pregnant at nv  Reviewed warning signs, normal FKCs, labor precautions and how/when to call.  RTC x 1 wk, call or present sooner prn.

## 2025-04-26 ENCOUNTER — ANESTHESIA (OUTPATIENT)
Dept: OBSTETRICS AND GYNECOLOGY | Facility: HOSPITAL | Age: 30
End: 2025-04-26
Payer: COMMERCIAL

## 2025-04-26 ENCOUNTER — PATIENT MESSAGE (OUTPATIENT)
Dept: OBSTETRICS AND GYNECOLOGY | Facility: CLINIC | Age: 30
End: 2025-04-26
Payer: COMMERCIAL

## 2025-04-26 ENCOUNTER — ANESTHESIA EVENT (OUTPATIENT)
Dept: OBSTETRICS AND GYNECOLOGY | Facility: HOSPITAL | Age: 30
End: 2025-04-26
Payer: COMMERCIAL

## 2025-04-26 ENCOUNTER — HOSPITAL ENCOUNTER (INPATIENT)
Facility: HOSPITAL | Age: 30
LOS: 2 days | Discharge: HOME | End: 2025-04-28
Attending: OBSTETRICS & GYNECOLOGY | Admitting: OBSTETRICS & GYNECOLOGY
Payer: COMMERCIAL

## 2025-04-26 DIAGNOSIS — O42.90 AMNIOTIC FLUID LEAKING: Primary | ICD-10-CM

## 2025-04-26 LAB
ABSOLUTE EOSINOPHIL (OHS): 0.21 K/UL
ABSOLUTE MONOCYTE (OHS): 0.88 K/UL (ref 0.3–1)
ABSOLUTE NEUTROPHIL COUNT (OHS): 6.96 K/UL (ref 1.8–7.7)
ALBUMIN SERPL BCP-MCNC: 3.1 G/DL (ref 3.5–5.2)
ALP SERPL-CCNC: 157 UNIT/L (ref 40–150)
ALT SERPL W/O P-5'-P-CCNC: 12 UNIT/L (ref 10–44)
ANION GAP (OHS): 12 MMOL/L (ref 8–16)
AST SERPL-CCNC: 26 UNIT/L (ref 11–45)
BASOPHILS # BLD AUTO: 0.07 K/UL
BASOPHILS NFR BLD AUTO: 0.6 %
BILIRUB SERPL-MCNC: 0.2 MG/DL (ref 0.1–1)
BUN SERPL-MCNC: 7 MG/DL (ref 6–20)
CALCIUM SERPL-MCNC: 8.9 MG/DL (ref 8.7–10.5)
CHLORIDE SERPL-SCNC: 104 MMOL/L (ref 95–110)
CO2 SERPL-SCNC: 18 MMOL/L (ref 23–29)
CREAT SERPL-MCNC: 0.7 MG/DL (ref 0.5–1.4)
ERYTHROCYTE [DISTWIDTH] IN BLOOD BY AUTOMATED COUNT: 13.9 % (ref 11.5–14.5)
GFR SERPLBLD CREATININE-BSD FMLA CKD-EPI: >60 ML/MIN/1.73/M2
GLUCOSE SERPL-MCNC: 87 MG/DL (ref 70–110)
HCT VFR BLD AUTO: 30.4 % (ref 37–48.5)
HGB BLD-MCNC: 9.6 GM/DL (ref 12–16)
HIV 1+2 AB+HIV1 P24 AG SERPL QL IA: NEGATIVE
IMM GRANULOCYTES # BLD AUTO: 0.13 K/UL (ref 0–0.04)
IMM GRANULOCYTES NFR BLD AUTO: 1.2 % (ref 0–0.5)
INDIRECT COOMBS: NORMAL
LYMPHOCYTES # BLD AUTO: 2.92 K/UL (ref 1–4.8)
MCH RBC QN AUTO: 25.9 PG (ref 27–31)
MCHC RBC AUTO-ENTMCNC: 31.6 G/DL (ref 32–36)
MCV RBC AUTO: 82 FL (ref 82–98)
NUCLEATED RBC (/100WBC) (OHS): 0 /100 WBC
PLATELET # BLD AUTO: 198 K/UL (ref 150–450)
PMV BLD AUTO: 12.4 FL (ref 9.2–12.9)
POTASSIUM SERPL-SCNC: 4.3 MMOL/L (ref 3.5–5.1)
PROT SERPL-MCNC: 7.5 GM/DL (ref 6–8.4)
RBC # BLD AUTO: 3.71 M/UL (ref 4–5.4)
RELATIVE EOSINOPHIL (OHS): 1.9 %
RELATIVE LYMPHOCYTE (OHS): 26.1 % (ref 18–48)
RELATIVE MONOCYTE (OHS): 7.9 % (ref 4–15)
RELATIVE NEUTROPHIL (OHS): 62.3 % (ref 38–73)
RH BLD: NORMAL
SODIUM SERPL-SCNC: 134 MMOL/L (ref 136–145)
SPECIMEN OUTDATE: NORMAL
WBC # BLD AUTO: 11.17 K/UL (ref 3.9–12.7)

## 2025-04-26 PROCEDURE — 72200005 HC VAGINAL DELIVERY LEVEL II

## 2025-04-26 PROCEDURE — 63600175 PHARM REV CODE 636 W HCPCS: Performed by: ADVANCED PRACTICE MIDWIFE

## 2025-04-26 PROCEDURE — 59400 OBSTETRICAL CARE: CPT | Mod: ,,, | Performed by: ADVANCED PRACTICE MIDWIFE

## 2025-04-26 PROCEDURE — 72100002 HC LABOR CARE, 1ST 8 HOURS

## 2025-04-26 PROCEDURE — 25000003 PHARM REV CODE 250: Performed by: NURSE ANESTHETIST, CERTIFIED REGISTERED

## 2025-04-26 PROCEDURE — 62326 NJX INTERLAMINAR LMBR/SAC: CPT | Performed by: NURSE ANESTHETIST, CERTIFIED REGISTERED

## 2025-04-26 PROCEDURE — 80053 COMPREHEN METABOLIC PANEL: CPT | Performed by: ADVANCED PRACTICE MIDWIFE

## 2025-04-26 PROCEDURE — 27200710 HC EPIDURAL INFUSION PUMP SET: Performed by: STUDENT IN AN ORGANIZED HEALTH CARE EDUCATION/TRAINING PROGRAM

## 2025-04-26 PROCEDURE — 10907ZC DRAINAGE OF AMNIOTIC FLUID, THERAPEUTIC FROM PRODUCTS OF CONCEPTION, VIA NATURAL OR ARTIFICIAL OPENING: ICD-10-PCS | Performed by: OBSTETRICS & GYNECOLOGY

## 2025-04-26 PROCEDURE — 25000003 PHARM REV CODE 250: Performed by: ADVANCED PRACTICE MIDWIFE

## 2025-04-26 PROCEDURE — 11000001 HC ACUTE MED/SURG PRIVATE ROOM

## 2025-04-26 PROCEDURE — 27800516 HC TRAY, EPIDURAL COMBO: Performed by: STUDENT IN AN ORGANIZED HEALTH CARE EDUCATION/TRAINING PROGRAM

## 2025-04-26 PROCEDURE — 86900 BLOOD TYPING SEROLOGIC ABO: CPT | Performed by: ADVANCED PRACTICE MIDWIFE

## 2025-04-26 PROCEDURE — 51702 INSERT TEMP BLADDER CATH: CPT

## 2025-04-26 PROCEDURE — 85025 COMPLETE CBC W/AUTO DIFF WBC: CPT | Performed by: ADVANCED PRACTICE MIDWIFE

## 2025-04-26 PROCEDURE — 59025 FETAL NON-STRESS TEST: CPT

## 2025-04-26 PROCEDURE — 51701 INSERT BLADDER CATHETER: CPT

## 2025-04-26 PROCEDURE — 3E033VJ INTRODUCTION OF OTHER HORMONE INTO PERIPHERAL VEIN, PERCUTANEOUS APPROACH: ICD-10-PCS | Performed by: OBSTETRICS & GYNECOLOGY

## 2025-04-26 PROCEDURE — 72100003 HC LABOR CARE, EA. ADDL. 8 HRS

## 2025-04-26 PROCEDURE — 63600175 PHARM REV CODE 636 W HCPCS: Performed by: NURSE ANESTHETIST, CERTIFIED REGISTERED

## 2025-04-26 PROCEDURE — 63600175 PHARM REV CODE 636 W HCPCS: Performed by: STUDENT IN AN ORGANIZED HEALTH CARE EDUCATION/TRAINING PROGRAM

## 2025-04-26 PROCEDURE — 99211 OFF/OP EST MAY X REQ PHY/QHP: CPT

## 2025-04-26 PROCEDURE — 87389 HIV-1 AG W/HIV-1&-2 AB AG IA: CPT | Performed by: ADVANCED PRACTICE MIDWIFE

## 2025-04-26 RX ORDER — LIDOCAINE HYDROCHLORIDE 10 MG/ML
10 INJECTION, SOLUTION INFILTRATION; PERINEURAL ONCE AS NEEDED
Status: DISCONTINUED | OUTPATIENT
Start: 2025-04-26 | End: 2025-04-26

## 2025-04-26 RX ORDER — ONDANSETRON 8 MG/1
8 TABLET, ORALLY DISINTEGRATING ORAL EVERY 8 HOURS PRN
Status: DISCONTINUED | OUTPATIENT
Start: 2025-04-26 | End: 2025-04-26

## 2025-04-26 RX ORDER — SIMETHICONE 80 MG
1 TABLET,CHEWABLE ORAL 4 TIMES DAILY PRN
Status: DISCONTINUED | OUTPATIENT
Start: 2025-04-26 | End: 2025-04-26

## 2025-04-26 RX ORDER — DIPHENHYDRAMINE HCL 25 MG
25 CAPSULE ORAL EVERY 4 HOURS PRN
Status: DISCONTINUED | OUTPATIENT
Start: 2025-04-26 | End: 2025-04-28 | Stop reason: HOSPADM

## 2025-04-26 RX ORDER — ROPIVACAINE HYDROCHLORIDE 2 MG/ML
12 INJECTION, SOLUTION EPIDURAL; INFILTRATION CONTINUOUS
Status: DISCONTINUED | OUTPATIENT
Start: 2025-04-26 | End: 2025-04-26

## 2025-04-26 RX ORDER — DIPHENHYDRAMINE HYDROCHLORIDE 50 MG/ML
25 INJECTION, SOLUTION INTRAMUSCULAR; INTRAVENOUS EVERY 4 HOURS PRN
Status: DISCONTINUED | OUTPATIENT
Start: 2025-04-26 | End: 2025-04-28 | Stop reason: HOSPADM

## 2025-04-26 RX ORDER — TRANEXAMIC ACID 10 MG/ML
1000 INJECTION, SOLUTION INTRAVENOUS EVERY 30 MIN PRN
Status: DISCONTINUED | OUTPATIENT
Start: 2025-04-26 | End: 2025-04-26

## 2025-04-26 RX ORDER — LAMOTRIGINE 100 MG/1
200 TABLET ORAL EVERY MORNING
Status: DISCONTINUED | OUTPATIENT
Start: 2025-04-26 | End: 2025-04-28 | Stop reason: HOSPADM

## 2025-04-26 RX ORDER — OXYTOCIN 10 [USP'U]/ML
10 INJECTION, SOLUTION INTRAMUSCULAR; INTRAVENOUS ONCE AS NEEDED
Status: DISCONTINUED | OUTPATIENT
Start: 2025-04-26 | End: 2025-04-26

## 2025-04-26 RX ORDER — METHYLERGONOVINE MALEATE 0.2 MG/ML
200 INJECTION INTRAVENOUS ONCE AS NEEDED
Status: DISCONTINUED | OUTPATIENT
Start: 2025-04-26 | End: 2025-04-26

## 2025-04-26 RX ORDER — OXYTOCIN 10 [USP'U]/ML
10 INJECTION, SOLUTION INTRAMUSCULAR; INTRAVENOUS ONCE AS NEEDED
Status: DISCONTINUED | OUTPATIENT
Start: 2025-04-26 | End: 2025-04-28 | Stop reason: HOSPADM

## 2025-04-26 RX ORDER — SODIUM CHLORIDE 9 MG/ML
INJECTION, SOLUTION INTRAVENOUS
Status: DISCONTINUED | OUTPATIENT
Start: 2025-04-26 | End: 2025-04-26

## 2025-04-26 RX ORDER — MISOPROSTOL 200 UG/1
800 TABLET ORAL ONCE AS NEEDED
Status: DISCONTINUED | OUTPATIENT
Start: 2025-04-26 | End: 2025-04-26

## 2025-04-26 RX ORDER — ONDANSETRON 8 MG/1
8 TABLET, ORALLY DISINTEGRATING ORAL EVERY 8 HOURS PRN
Status: DISCONTINUED | OUTPATIENT
Start: 2025-04-26 | End: 2025-04-28 | Stop reason: HOSPADM

## 2025-04-26 RX ORDER — SODIUM CHLORIDE 0.9 % (FLUSH) 0.9 %
10 SYRINGE (ML) INJECTION
Status: DISCONTINUED | OUTPATIENT
Start: 2025-04-26 | End: 2025-04-28 | Stop reason: HOSPADM

## 2025-04-26 RX ORDER — HYDROCODONE BITARTRATE AND ACETAMINOPHEN 5; 325 MG/1; MG/1
1 TABLET ORAL EVERY 4 HOURS PRN
Status: DISCONTINUED | OUTPATIENT
Start: 2025-04-26 | End: 2025-04-27 | Stop reason: SDUPTHER

## 2025-04-26 RX ORDER — CARBOPROST TROMETHAMINE 250 UG/ML
250 INJECTION, SOLUTION INTRAMUSCULAR
Status: DISCONTINUED | OUTPATIENT
Start: 2025-04-26 | End: 2025-04-26

## 2025-04-26 RX ORDER — DEXMEDETOMIDINE HYDROCHLORIDE 100 UG/ML
INJECTION, SOLUTION INTRAVENOUS
Status: DISCONTINUED | OUTPATIENT
Start: 2025-04-26 | End: 2025-04-26

## 2025-04-26 RX ORDER — MISOPROSTOL 200 UG/1
800 TABLET ORAL ONCE AS NEEDED
Status: DISCONTINUED | OUTPATIENT
Start: 2025-04-26 | End: 2025-04-28 | Stop reason: HOSPADM

## 2025-04-26 RX ORDER — OXYTOCIN-SODIUM CHLORIDE 0.9% IV SOLN 30 UNIT/500ML 30-0.9/5 UT/ML-%
30 SOLUTION INTRAVENOUS ONCE AS NEEDED
Status: DISCONTINUED | OUTPATIENT
Start: 2025-04-26 | End: 2025-04-26

## 2025-04-26 RX ORDER — METHYLERGONOVINE MALEATE 0.2 MG/ML
200 INJECTION INTRAVENOUS ONCE AS NEEDED
Status: DISCONTINUED | OUTPATIENT
Start: 2025-04-26 | End: 2025-04-28 | Stop reason: HOSPADM

## 2025-04-26 RX ORDER — ACETAMINOPHEN 325 MG/1
650 TABLET ORAL EVERY 6 HOURS SCHEDULED
Status: DISCONTINUED | OUTPATIENT
Start: 2025-04-26 | End: 2025-04-28 | Stop reason: HOSPADM

## 2025-04-26 RX ORDER — IBUPROFEN 600 MG/1
600 TABLET ORAL EVERY 6 HOURS
Status: DISCONTINUED | OUTPATIENT
Start: 2025-04-26 | End: 2025-04-28 | Stop reason: HOSPADM

## 2025-04-26 RX ORDER — OXYTOCIN-SODIUM CHLORIDE 0.9% IV SOLN 30 UNIT/500ML 30-0.9/5 UT/ML-%
0-32 SOLUTION INTRAVENOUS CONTINUOUS
Status: DISCONTINUED | OUTPATIENT
Start: 2025-04-26 | End: 2025-04-26

## 2025-04-26 RX ORDER — TRANEXAMIC ACID 10 MG/ML
1000 INJECTION, SOLUTION INTRAVENOUS EVERY 30 MIN PRN
Status: DISCONTINUED | OUTPATIENT
Start: 2025-04-26 | End: 2025-04-28 | Stop reason: HOSPADM

## 2025-04-26 RX ORDER — OXYTOCIN-SODIUM CHLORIDE 0.9% IV SOLN 30 UNIT/500ML 30-0.9/5 UT/ML-%
95 SOLUTION INTRAVENOUS CONTINUOUS PRN
Status: DISCONTINUED | OUTPATIENT
Start: 2025-04-26 | End: 2025-04-26

## 2025-04-26 RX ORDER — DOCUSATE SODIUM 100 MG/1
200 CAPSULE, LIQUID FILLED ORAL 2 TIMES DAILY PRN
Status: DISCONTINUED | OUTPATIENT
Start: 2025-04-26 | End: 2025-04-28 | Stop reason: HOSPADM

## 2025-04-26 RX ORDER — QUETIAPINE FUMARATE 100 MG/1
200 TABLET, FILM COATED ORAL NIGHTLY
Status: DISCONTINUED | OUTPATIENT
Start: 2025-04-26 | End: 2025-04-28 | Stop reason: HOSPADM

## 2025-04-26 RX ORDER — LIDOCAINE HYDROCHLORIDE AND EPINEPHRINE 15; 5 MG/ML; UG/ML
INJECTION, SOLUTION EPIDURAL
Status: DISCONTINUED | OUTPATIENT
Start: 2025-04-26 | End: 2025-04-26

## 2025-04-26 RX ORDER — SIMETHICONE 80 MG
1 TABLET,CHEWABLE ORAL EVERY 6 HOURS PRN
Status: DISCONTINUED | OUTPATIENT
Start: 2025-04-26 | End: 2025-04-28 | Stop reason: HOSPADM

## 2025-04-26 RX ORDER — SODIUM CITRATE AND CITRIC ACID MONOHYDRATE 334; 500 MG/5ML; MG/5ML
30 SOLUTION ORAL ONCE
Status: DISCONTINUED | OUTPATIENT
Start: 2025-04-26 | End: 2025-04-26

## 2025-04-26 RX ORDER — DIPHENOXYLATE HYDROCHLORIDE AND ATROPINE SULFATE 2.5; .025 MG/1; MG/1
2 TABLET ORAL EVERY 6 HOURS PRN
Status: DISCONTINUED | OUTPATIENT
Start: 2025-04-26 | End: 2025-04-28 | Stop reason: HOSPADM

## 2025-04-26 RX ORDER — FAMOTIDINE 10 MG/ML
20 INJECTION, SOLUTION INTRAVENOUS ONCE
Status: DISCONTINUED | OUTPATIENT
Start: 2025-04-26 | End: 2025-04-26

## 2025-04-26 RX ORDER — CARBOPROST TROMETHAMINE 250 UG/ML
250 INJECTION, SOLUTION INTRAMUSCULAR
Status: DISCONTINUED | OUTPATIENT
Start: 2025-04-26 | End: 2025-04-28 | Stop reason: HOSPADM

## 2025-04-26 RX ORDER — PRENATAL WITH FERROUS FUM AND FOLIC ACID 3080; 920; 120; 400; 22; 1.84; 3; 20; 10; 1; 12; 200; 27; 25; 2 [IU]/1; [IU]/1; MG/1; [IU]/1; MG/1; MG/1; MG/1; MG/1; MG/1; MG/1; UG/1; MG/1; MG/1; MG/1; MG/1
1 TABLET ORAL DAILY
Status: DISCONTINUED | OUTPATIENT
Start: 2025-04-26 | End: 2025-04-28 | Stop reason: HOSPADM

## 2025-04-26 RX ORDER — OXYTOCIN-SODIUM CHLORIDE 0.9% IV SOLN 30 UNIT/500ML 30-0.9/5 UT/ML-%
10 SOLUTION INTRAVENOUS ONCE AS NEEDED
Status: COMPLETED | OUTPATIENT
Start: 2025-04-26 | End: 2025-04-26

## 2025-04-26 RX ORDER — CALCIUM CARBONATE 200(500)MG
500 TABLET,CHEWABLE ORAL 3 TIMES DAILY PRN
Status: DISCONTINUED | OUTPATIENT
Start: 2025-04-26 | End: 2025-04-26

## 2025-04-26 RX ORDER — OXYTOCIN-SODIUM CHLORIDE 0.9% IV SOLN 30 UNIT/500ML 30-0.9/5 UT/ML-%
95 SOLUTION INTRAVENOUS ONCE AS NEEDED
Status: DISCONTINUED | OUTPATIENT
Start: 2025-04-26 | End: 2025-04-28 | Stop reason: HOSPADM

## 2025-04-26 RX ORDER — OXYTOCIN-SODIUM CHLORIDE 0.9% IV SOLN 30 UNIT/500ML 30-0.9/5 UT/ML-%
95 SOLUTION INTRAVENOUS ONCE AS NEEDED
Status: DISCONTINUED | OUTPATIENT
Start: 2025-04-26 | End: 2025-04-26

## 2025-04-26 RX ORDER — OXYTOCIN-SODIUM CHLORIDE 0.9% IV SOLN 30 UNIT/500ML 30-0.9/5 UT/ML-%
95 SOLUTION INTRAVENOUS CONTINUOUS PRN
Status: DISCONTINUED | OUTPATIENT
Start: 2025-04-26 | End: 2025-04-28 | Stop reason: HOSPADM

## 2025-04-26 RX ORDER — OXYTOCIN-SODIUM CHLORIDE 0.9% IV SOLN 30 UNIT/500ML 30-0.9/5 UT/ML-%
10 SOLUTION INTRAVENOUS ONCE AS NEEDED
Status: DISCONTINUED | OUTPATIENT
Start: 2025-04-26 | End: 2025-04-28 | Stop reason: HOSPADM

## 2025-04-26 RX ORDER — HYDROCORTISONE 25 MG/G
CREAM TOPICAL 3 TIMES DAILY PRN
Status: DISCONTINUED | OUTPATIENT
Start: 2025-04-26 | End: 2025-04-28 | Stop reason: HOSPADM

## 2025-04-26 RX ORDER — DIPHENOXYLATE HYDROCHLORIDE AND ATROPINE SULFATE 2.5; .025 MG/1; MG/1
2 TABLET ORAL EVERY 6 HOURS PRN
Status: DISCONTINUED | OUTPATIENT
Start: 2025-04-26 | End: 2025-04-26

## 2025-04-26 RX ORDER — SODIUM CHLORIDE, SODIUM LACTATE, POTASSIUM CHLORIDE, CALCIUM CHLORIDE 600; 310; 30; 20 MG/100ML; MG/100ML; MG/100ML; MG/100ML
INJECTION, SOLUTION INTRAVENOUS CONTINUOUS
Status: DISCONTINUED | OUTPATIENT
Start: 2025-04-26 | End: 2025-04-26

## 2025-04-26 RX ADMIN — LIDOCAINE HYDROCHLORIDE,EPINEPHRINE BITARTRATE 3 ML: 15; .005 INJECTION, SOLUTION EPIDURAL; INFILTRATION; INTRACAUDAL; PERINEURAL at 07:04

## 2025-04-26 RX ADMIN — PRENATAL VITAMINS-IRON FUMARATE 27 MG IRON-FOLIC ACID 0.8 MG TABLET 1 TABLET: at 02:04

## 2025-04-26 RX ADMIN — SODIUM CHLORIDE, POTASSIUM CHLORIDE, SODIUM LACTATE AND CALCIUM CHLORIDE: 600; 310; 30; 20 INJECTION, SOLUTION INTRAVENOUS at 07:04

## 2025-04-26 RX ADMIN — HYDROCODONE BITARTRATE AND ACETAMINOPHEN 1 TABLET: 5; 325 TABLET ORAL at 04:04

## 2025-04-26 RX ADMIN — SODIUM CHLORIDE, POTASSIUM CHLORIDE, SODIUM LACTATE AND CALCIUM CHLORIDE 1000 ML: 600; 310; 30; 20 INJECTION, SOLUTION INTRAVENOUS at 06:04

## 2025-04-26 RX ADMIN — Medication 4 MILLI-UNITS/MIN: at 08:04

## 2025-04-26 RX ADMIN — Medication 95 MILLI-UNITS/MIN: at 11:04

## 2025-04-26 RX ADMIN — QUETIAPINE FUMARATE 200 MG: 100 TABLET ORAL at 09:04

## 2025-04-26 RX ADMIN — DEXMEDETOMIDINE 10 MCG: 200 INJECTION, SOLUTION INTRAVENOUS at 07:04

## 2025-04-26 RX ADMIN — IBUPROFEN 600 MG: 600 TABLET, FILM COATED ORAL at 08:04

## 2025-04-26 RX ADMIN — IBUPROFEN 600 MG: 600 TABLET, FILM COATED ORAL at 02:04

## 2025-04-26 RX ADMIN — DOCUSATE SODIUM 200 MG: 100 CAPSULE, LIQUID FILLED ORAL at 09:04

## 2025-04-26 RX ADMIN — HYDROCODONE BITARTRATE AND ACETAMINOPHEN 1 TABLET: 5; 325 TABLET ORAL at 09:04

## 2025-04-26 RX ADMIN — ROPIVACAINE HYDROCHLORIDE 4 ML: 2 INJECTION, SOLUTION EPIDURAL; INFILTRATION at 07:04

## 2025-04-26 RX ADMIN — OXYTOCIN-SODIUM CHLORIDE 0.9% IV SOLN 30 UNIT/500ML 10 UNITS: 30-0.9/5 SOLUTION at 11:04

## 2025-04-26 RX ADMIN — LAMOTRIGINE 200 MG: 100 TABLET ORAL at 02:04

## 2025-04-26 NOTE — ANESTHESIA PROCEDURE NOTES
Epidural    Patient location during procedure: OB   Reason for block: primary anesthetic   Reason for block: labor analgesia requested by patient and obstetrician  Diagnosis: IUP   Start time: 4/26/2025 7:16 AM  Timeout: 4/26/2025 7:16 AM  End time: 4/26/2025 7:26 AM  Surgery related to: Planned vaginal delivery    Staffing  Performing Provider: Sergio Argueta CRNA  Authorizing Provider: Laith Meneses MD    Staffing  Performed by: Sergio Argueta CRNA  Authorized by: Laith Meneses MD        Preanesthetic Checklist  Completed: patient identified, IV checked, site marked, risks and benefits discussed, surgical consent, monitors and equipment checked, pre-op evaluation, timeout performed, anesthesia consent given, hand hygiene performed and patient being monitored  Preparation  Patient position: sitting  Prep: ChloraPrep  Patient monitoring: Pulse Ox and Blood Pressure  Reason for block: primary anesthetic   Epidural  Skin Anesthetic: lidocaine 1%  Skin Wheal: 3 mL  Administration type: continuous  Approach: midline  Interspace: L3-4    Injection technique: CHELY air  Needle and Epidural Catheter  Needle type: Tuohy   Needle gauge: 17  Needle length: 3.5 inches  Needle insertion depth: 6 cm  Catheter type: springwound and multi-orifice  Catheter size: 18 G  Catheter at skin depth: 12 cm  Insertion Attempts: 1  Test dose: 3 mL of lidocaine 1.5% with Epi 1-to-200,000  Additional Documentation: incremental injection, negative aspiration for heme and CSF, no paresthesia on injection, no signs/symptoms of IV or SA injection, no significant pain on injection and no significant complaints from patient  Needle localization: anatomical landmarks  Medications:  Volume per aspiration: 0 mL  Time between aspirations: 2 minutes   Assessment  Upper dermatomal levels - Left: T10  Right: T10   Dermatomal levels determined by pinch or prick  Ease of block: easy  Patient's tolerance of the procedure: comfortable  throughout block and no complaints No inadvertent dural puncture with Tuohy.  Dural puncture not performed with spinal needle

## 2025-04-26 NOTE — PLAN OF CARE
Problem: Adult Inpatient Plan of Care  Goal: Plan of Care Review  Outcome: Progressing  Goal: Patient-Specific Goal (Individualized)  Outcome: Progressing  Goal: Absence of Hospital-Acquired Illness or Injury  Outcome: Progressing  Goal: Optimal Comfort and Wellbeing  Outcome: Progressing  Goal: Readiness for Transition of Care  Outcome: Progressing     Problem: Infection  Goal: Absence of Infection Signs and Symptoms  Outcome: Progressing     Problem: Breastfeeding  Goal: Effective Breastfeeding  Outcome: Progressing     Problem: Postpartum (Vaginal Delivery)  Goal: Successful Parent Role Transition  Outcome: Progressing  Goal: Hemostasis  Outcome: Progressing  Goal: Absence of Infection Signs and Symptoms  Outcome: Progressing  Goal: Anesthesia/Sedation Recovery  Outcome: Progressing  Goal: Optimal Pain Control and Function  Outcome: Progressing  Goal: Effective Urinary Elimination  Outcome: Progressing     Problem:  Fall Injury Risk  Goal: Absence of Fall, Infant Drop and Related Injury  Outcome: Met

## 2025-04-26 NOTE — PROGRESS NOTES
[facility]  Obstetrics  Labor Progress Note    Patient Name: Iris Johnson  MRN: [id number]  Admission Date: 2025  Hospital Length of Stay: 0 days  Attending Physician: Sondra Mishra,*  Primary Care Provider: Gurinder Perez MD    Subjective:     Principal Problem:Amniotic fluid leaking    Hospital Course:  Admit to L&D  Expectant management for now, breast pumping  IV LR  BRIGHT upon request, would like to avoid pitocin, discussed may be needed to get start active labor  Anticipate progress and vaginal delivery   25 @ 0630: ready to get things to rolling this morning, ready for BRIGHT and pitocin.     Interval History:  Iris is a 30 y.o.  at 40w5d. She is doing well.     Objective:     Vital Signs (Most Recent):  Pulse: 76 (25 0358)  BP: 130/74 (25 0308)  SpO2: 99 % (25 0358) Vital Signs (24h Range):  Pulse:  [57-95] 76  SpO2:  [89 %-100 %] 99 %  BP: (130)/(74) 130/74        There is no height or weight on file to calculate BMI.    FHT: 120 Cat 1 (reassuring)  TOCO:  Q 5-6 minutes    Cervical Exam:  Deferred until after BRIGHT placement     Significant Labs:  Lab Results   Component Value Date    GROUPTRH A POS 2025    HEPBSAG Non-reactive 2024    STREPBCULT No Group B Streptococcus isolated 2023       I have personallly reviewed all pertinent lab results from the last 24 hours.    Physical Exam:   Constitutional: She is oriented to person, place, and time. She appears well-developed and well-nourished.    HENT:   Head: Normocephalic.      Cardiovascular:  Normal rate and regular rhythm.             Pulmonary/Chest: Effort normal.        Abdominal: Soft.     Genitourinary:    Vagina and uterus normal.             Musculoskeletal: Normal range of motion and moves all extremeties.       Neurological: She is alert and oriented to person, place, and time. She has normal reflexes.    Skin: Skin is warm and dry.        Review of  Systems  Assessment/Plan:     30 y.o. female  at 40w5d for:    * Amniotic fluid leaking  Admit to L&D  Expectant management for now, breast pumping  IV LR  BRIGHT upon request, would like to avoid pitocin, discussed may be needed to get start active labor  Anticipate progress and vaginal delivery     Bipolar disorder  Seeing Tiffanie Galvez at [facility]  Lamictal and seroquel    Intractable chronic migraine without aura and without status migrainosus  Seeing neuro, botox injections          Paula Rondon CNM  Obstetrics  [facility]

## 2025-04-26 NOTE — LACTATION NOTE
"Lactation rounds-  Mother sitting in bed eating a granola bar, recovering from vaginal delivery. Father on couch is holding sleeping swaddled infant. Introduced self and stated that transition nurse reported that infant fed well for first feed. Mother replies "yeah when he finally got around to waking up to eat" Mother states she  her first child for a year and her second child for 6-7 months before she got sick and her milk dried up.     Lactation packet reviewed for days 1-2.  Discussed early feeding cues and encouraged mother to feed baby in response to those cues. Encouraged on demand feedings and skin to skin.  Reviewed normal feeding expectations of 8 or more feedings per 24 hour period, cues that babies use to signal hunger and satiety and cluster feeding. Discussed the adequacy of colostrum and baby belly size for the first 3 days of life along with expected output. Also discussed  latching vs. Older infant latching and that  needs neck/head support and guidance into the breast to latch.     When I asked mother about if she had a breast pump at home she replied that she had multiple. When I asked mother if she received a new one through her insurance during this pregnancy she said no. I asked if mother was aware she was eligible for a new breast pump through insurance with each pregnancy she got upset and replied with an expletive "_ _ _ _ those people, they triple charged me last time, no way I'm dealing with them again." Instructed mother since she plans to use old pumps that she should change out all pump parts and tubing. Mother states "they barely been used, they're fine." Informed mother that even if they haven't been used much or at all, if they've been sitting around for an period of time the plastics and silicone pieces can dry out or break down and not work as efficiently which will decrease her suction and stimulation. Mother verbalized understanding.     Throughout entire " education session mother primarily paying attention to her granAviasales bar and only rarely responding or looking at me.     Mother verbalizes understanding of all education and counseling. Mother denies any further lactation needs or concerns at this time. Discussed lactation availability. Encouraged mother to call for assistance when needs arise.    Transition nurseHalle, updated on encounter.

## 2025-04-26 NOTE — L&D DELIVERY NOTE
"[facility] - Labor & Delivery  Vaginal Delivery   Operative Note    SUMMARY     Normal spontaneous vaginal delivery of live infant, was placed on mothers abdomen for skin to skin and bulb suctioning performed.  Infant delivered position OA over intact perineum.  Nuchal cord: No.    Spontaneous delivery of placenta and IV pitocin given noting good uterine tone.  No lacerations noted.  Patient tolerated delivery well. Sponge needle and lap counted correctly x2.        Indications: Amniotic fluid leaking  Pregnancy complicated by: Problem List[1]  Admitting GA: 40w5d    Delivery Information for Bonifacio Johnson    Birth information:  YOB: 2027   Time of birth: 11:31 AM   Sex: male   Head Delivery Date/Time: 2025 11:30 AM   Delivery type: Vaginal, Spontaneous   Gestational Age: 40w5d       Delivery Providers    Delivering clinician: Paula Rondon CNM   Provider Role    Khadijah Webb RN Registered Nurse    Reina Frazier RN Registered Nurse    Manuela Morocho,  Surgical [facility]              Measurements    Weight: 3580 g  Weight (lbs): 7 lb 14.3 oz  Length: 49.5 cm  Length (in): 19.5"  Head circumference: 35 cm  Chest circumference: 34.5 cm  Abdominal girth: 33 cm         Apgars    Living status: Living  Apgar Component Scores:  1 min.:  5 min.:  10 min.:  15 min.:  20 min.:    Skin color:  0  1       Heart rate:  2  2       Reflex irritability:  2  2       Muscle tone:  2  2       Respiratory effort:  2  2       Total:  8  9       Apgars assigned by: CELSA FRAZIER RN         Operative Delivery    Forceps attempted?: No  Vacuum extractor attempted?: No         Shoulder Dystocia    Shoulder dystocia present?: No           Presentation    Presentation: Vertex  Position: Right Occiput Anterior           Interventions/Resuscitation    Method: Bulb Suctioning, Tactile Stimulation, Deep Suctioning       Cord    Vessels: 3 vessels  Complications: None  Delayed Cord Clamping?: Yes  Cord Clamped " Date/Time: 2025 11:35 AM  Cord Blood Disposition: Discarded  Gases Sent?: No  Stem Cell Collection (by MD): No       Placenta    Placenta delivery date/time: 2025 11:42  Placenta removal: Spontaneous  Placenta appearance: Intact  Placenta disposition: Discarded           Labor Events:       labor: No     Labor Onset Date/Time: 2025 00:40     Dilation Complete Date/Time: 2025 11:08     Start Pushing Date/Time: 2025 11:24       Start Pushing Date/Time: 2025 11:24     Rupture Date/Time: 25 0815        Rupture type: Forebag (AROM forebag)        Fluid Amount:       Fluid Color: Clear              steroids: None     Antibiotics given for GBS: No     Induction: none     Indications for induction:        Augmentation: amniotomy;oxytocin     Indications for augmentation: Ineffective Contraction Pattern     Labor complications: None     Additional complications:          Cervical ripening:                     Delivery:      Episiotomy: None     Indication for Episiotomy:       Perineal Lacerations: None Repaired:      Periurethral Laceration:   Repaired:     Labial Laceration:   Repaired:     Sulcus Laceration:   Repaired:     Vaginal Laceration:   Repaired:     Cervical Laceration:   Repaired:     Repair suture: None     Repair # of packets:       Last Value - EBL - Nursing (mL):       Sum - EBL - Nursing (mL): 0     Last Value - EBL - Anesthesia (mL):      Calculated QBL (mL): 200     Running total QBL (mL): 200     Vaginal Sweep Performed: Yes     Surgicount Correct: Yes     Vaginal Packing: No Quantity:       Other providers:       Anesthesia    Method: Epidural          Details (if applicable):  Trial of Labor      Categorization:      Priority:     Indications for :     Incision Type:       Additional  information:  Forceps:    Vacuum:    Breech:    Observed anomalies    Other (Comments):              [1]   Patient Active  Problem List  Diagnosis    Intractable chronic migraine without aura and without status migrainosus    Anxiety    Attention deficit hyperactivity disorder (ADHD), predominantly inattentive type    Bipolar disorder    CTS (carpal tunnel syndrome)    Insomnia    Migraine    ASCUS of cervix with negative high risk HPV    Post-term pregnancy, 40-42 weeks of gestation    Vagina, candidiasis    HSV1    BV (bacterial vaginosis)    Amniotic fluid leaking     (normal spontaneous vaginal delivery)    Single liveborn

## 2025-04-26 NOTE — SUBJECTIVE & OBJECTIVE
Interval History:  Iris is a 30 y.o.  at 40w5d. She is doing well.     Objective:     Vital Signs (Most Recent):  Pulse: 76 (25 0358)  BP: 130/74 (25 0308)  SpO2: 99 % (25 0358) Vital Signs (24h Range):  Pulse:  [57-95] 76  SpO2:  [89 %-100 %] 99 %  BP: (130)/(74) 130/74        There is no height or weight on file to calculate BMI.    FHT: 120 Cat 1 (reassuring)  TOCO:  Q 5-6 minutes    Cervical Exam:  Deferred until after BRIGHT placement     Significant Labs:  Lab Results   Component Value Date    GROUPTRH A POS 2025    HEPBSAG Non-reactive 2024    STREPBCULT No Group B Streptococcus isolated 2023       I have personallly reviewed all pertinent lab results from the last 24 hours.    Physical Exam:   Constitutional: She is oriented to person, place, and time. She appears well-developed and well-nourished.    HENT:   Head: Normocephalic.      Cardiovascular:  Normal rate and regular rhythm.             Pulmonary/Chest: Effort normal.        Abdominal: Soft.     Genitourinary:    Vagina and uterus normal.             Musculoskeletal: Normal range of motion and moves all extremeties.       Neurological: She is alert and oriented to person, place, and time. She has normal reflexes.    Skin: Skin is warm and dry.        Review of Systems

## 2025-04-26 NOTE — ASSESSMENT & PLAN NOTE
Admit to L&D  Expectant management for now, breast pumping  IV LR  BRIGHT upon request, would like to avoid pitocin, discussed may be needed to get start active labor  Anticipate progress and vaginal delivery

## 2025-04-26 NOTE — H&P
[facility] - Labor & Delivery  Obstetrics  History & Physical    Patient Name: Iris Johnson  MRN: [id number]  Admission Date: 2025  Primary Care Provider: Gurinder Perez MD    Subjective:     Principal Problem:Amniotic fluid leaking    History of Present Illness:  Presents to L&D reporting leaking of fluid since 12:40 that leaks down her leg    Obstetric HPI:  Patient reports Intensity: moderate contractions, active fetal movement, No vaginal bleeding , Yes loss of fluid     This pregnancy has been complicated by   Bipolar  Migraines  HSV1    OB History    Para Term  AB Living   3 2 2 0 0 2   SAB IAB Ectopic Multiple Live Births   0 0 0 0 2      # Outcome Date GA Lbr Karlo/2nd Weight Sex Type Anes PTL Lv   3 Current            2 Term 23 40w6d / 00:10 3.5 kg (7 lb 11.5 oz) M Vag-Spont EPI N MONICA      Complications: Fetal Intolerance      Name: JOYCE JOHNSON      Apgar1: 8  Apgar5: 9   1 Term      Vag-Spont        Past Medical History:   Diagnosis Date    ADHD (attention deficit hyperactivity disorder)     Anemia     ASCUS of cervix with negative high risk HPV 2023    Bipolar disorder     Chicken pox     childhood    Headache     Heart palpitations     Hypotension     Insomnia     Migraine     Obstetrical laceration, second degree 2023    Postpartum depression     Substance abuse     prior use, no current use    Syncope 2009    admitted x 1    Syncope      Past Surgical History:   Procedure Laterality Date    dental extraction ()  2013    RHINOPLASTY  2010    TONSILLECTOMY         PTA Medications   Medication Sig    lamoTRIgine (LAMICTAL) 200 MG tablet Take 1 tablet (200 mg total) by mouth every morning.    QUEtiapine (SEROQUEL) 200 MG Tab Take 1 tablet (200 mg total) by mouth every evening.    valACYclovir (VALTREX) 500 MG tablet Take 1 tablet (500 mg total) by mouth 2 (two) times daily. (Patient not taking: Reported on 3/27/2025)       Review of patient's allergies  indicates:   Allergen Reactions    Bactrim [sulfamethoxazole-trimethoprim]     Sulfa (sulfonamide antibiotics)         Family History       Problem Relation (Age of Onset)    Arthritis Paternal Grandmother    Asthma Paternal Grandmother    Bipolar disorder Mother    Breast cancer Mother, Paternal Grandmother (55)    Cancer Mother, Paternal Grandmother, Paternal Grandfather    Cervical cancer Mother    Depression Paternal Grandmother    Heart block Paternal Grandfather    Heart disease Paternal Grandfather    Hyperlipidemia Father, Paternal Grandmother, Paternal Grandfather    Hypertension Paternal Grandmother, Paternal Grandfather    Mental illness Mother    Skin cancer Mother    Stroke Paternal Grandmother    stroke Paternal Grandmother          Tobacco Use    Smoking status: Former     Types: Vaping with nicotine     Passive exposure: Past    Smokeless tobacco: Never   Substance and Sexual Activity    Alcohol use: Not Currently     Comment: 1-2 drinks lper week    Drug use: Not Currently     Types: Amphetamines, Marijuana     Comment: narcotics, ecstacy (all prior use)    Sexual activity: Not Currently     Partners: Male     Birth control/protection: None     Comment: birth control pill     Review of Systems   Gastrointestinal:  Positive for abdominal pain.   Genitourinary:  Positive for vaginal discharge. Negative for vaginal bleeding.   Neurological:  Negative for headaches.   All other systems reviewed and are negative.     Objective:     Vital Signs (Most Recent):  Pulse: 76 (04/26/25 0358)  BP: 130/74 (04/26/25 0308)  SpO2: 99 % (04/26/25 0358) Vital Signs (24h Range):  Pulse:  [57-95] 76  SpO2:  [89 %-100 %] 99 %  BP: (130)/(74) 130/74        There is no height or weight on file to calculate BMI.    FHT: 130bpm with moderate variability and accelerations, no decelerations, Cat 1 (reassuring)  TOCO:  Q 2-6 minutes     Physical Exam:   Constitutional: She is oriented to person, place, and time. She appears  well-developed and well-nourished.    HENT:   Head: Normocephalic.      Cardiovascular:  Normal rate.             Pulmonary/Chest: Effort normal.        Abdominal: Soft. There is no abdominal tenderness.     Genitourinary: There is vaginal discharge in the vagina.           Musculoskeletal: Normal range of motion and moves all extremeties.       Neurological: She is alert and oriented to person, place, and time.    Skin: Skin is warm and dry.    Psychiatric: She has a normal mood and affect. Her behavior is normal. Judgment and thought content normal.        Cervix:  Dilation:  3  Effacement:  60  Station: -3  Presentation: Vertex     Significant Labs:  Lab Results   Component Value Date    GROUPTRH A POS 2025    HEPBSAG Non-reactive 2024    STREPBCULT No Group B Streptococcus isolated 2023       I have personallly reviewed all pertinent lab results from the last 24 hours.  Assessment/Plan:     30 y.o. female  at 40w5d for:    * Amniotic fluid leaking  Admit to L&D  Expectant management for now, breast pumping  IV LR  BRIGHT upon request, would like to avoid pitocin, discussed may be needed to get start active labor  Anticipate progress and vaginal delivery     Bipolar disorder  Seeing Tiffanie Galvez at [facility]  Lamictal and seroquel    Intractable chronic migraine without aura and without status migrainosus  Seeing neuro, botox injections        Michael Atkins CNM  Obstetrics  [facility] - Labor & Delivery

## 2025-04-26 NOTE — ANESTHESIA POSTPROCEDURE EVALUATION
Anesthesia Post Evaluation    Patient: Iris Johnson    Procedure(s) Performed: * No procedures listed *    Final Anesthesia Type: epidural      Patient location during evaluation: labor & delivery  Patient participation: Yes- Able to Participate  Level of consciousness: awake and alert, awake and oriented  Post-procedure vital signs: reviewed and stable  Pain management: adequate  Airway patency: patent    PONV status at discharge: No PONV  Anesthetic complications: no      Cardiovascular status: blood pressure returned to baseline and hemodynamically stable  Respiratory status: unassisted and spontaneous ventilation  Hydration status: euvolemic  Follow-up not needed.              Vitals Value Taken Time   /60 04/26/25 13:48   Temp 36.6 °C (97.8 °F) 04/26/25 12:30   Pulse 75 04/26/25 13:52   Resp 18 04/26/25 12:00   SpO2 100 % 04/26/25 13:52   Vitals shown include unfiled device data.      No case tracking events are documented in the log.      Pain/Olayinka Score: Pain Rating Prior to Med Admin: 8 (4/26/2025  2:08 PM)  Olayinka Score: 10 (4/26/2025  1:45 PM)

## 2025-04-26 NOTE — ANESTHESIA PREPROCEDURE EVALUATION
2025  Iris Johnson is a 30 y.o., female.      Pre-op Assessment    I have reviewed the Patient Summary Reports.     I have reviewed the Nursing Notes.    I have reviewed the Medications.     Review of Systems  Anesthesia Hx:   Neg history of prior surgery.          Denies Family Hx of Anesthesia complications.    Denies Personal Hx of Anesthesia complications.                    OB/GYN/PEDS:    Planned Vaginal Delivery                     Neurological:    Neuromuscular Disease,  Headaches      Dx of Headaches                         Neuromuscular Disease   Psych:  Psychiatric History                  Physical Exam  General: Well nourished, Cooperative and Alert    Airway:  Mallampati: II   Mouth Opening: Normal  TM Distance: Normal  Tongue: Normal  Neck ROM: Normal ROM    Dental:  Intact    Chest/Lungs:  Clear to auscultation, Normal Respiratory Rate    Heart:  Rate: Normal  Rhythm: Regular Rhythm  Sounds: Normal      Lab Results   Component Value Date    WBC 11.17 2025    HGB 9.6 (L) 2025    HCT 30.4 (L) 2025    MCV 82 2025     2025       OB History          3    Para   2    Term   2            AB        Living   2         SAB        IAB        Ectopic        Multiple   0    Live Births   2                   Anesthesia Plan  Type of Anesthesia, risks & benefits discussed:    Anesthesia Type: Epidural  Intra-op Monitoring Plan: Standard ASA Monitors  Post Op Pain Control Plan: epidural analgesia  Informed Consent: Informed consent signed with the Patient and all parties understand the risks and agree with anesthesia plan.  All questions answered.   ASA Score: 2  Day of Surgery Review of History & Physical: H&P Update referred to the surgeon/provider.    Ready For Surgery From Anesthesia Perspective.     .

## 2025-04-26 NOTE — SUBJECTIVE & OBJECTIVE
Obstetric HPI:  Patient reports Intensity: moderate contractions, active fetal movement, No vaginal bleeding , Yes loss of fluid     This pregnancy has been complicated by   Bipolar  Migraines  HSV1    OB History    Para Term  AB Living   3 2 2 0 0 2   SAB IAB Ectopic Multiple Live Births   0 0 0 0 2      # Outcome Date GA Lbr Karlo/2nd Weight Sex Type Anes PTL Lv   3 Current            2 Term 23 40w6d / 00:10 3.5 kg (7 lb 11.5 oz) M Vag-Spont EPI N MONICA      Complications: Fetal Intolerance      Name: JOYCE RODAS      Apgar1: 8  Apgar5: 9   1 Term      Vag-Spont        Past Medical History:   Diagnosis Date    ADHD (attention deficit hyperactivity disorder)     Anemia     ASCUS of cervix with negative high risk HPV 2023    Bipolar disorder     Chicken pox     childhood    Headache     Heart palpitations     Hypotension     Insomnia     Migraine     Obstetrical laceration, second degree 2023    Postpartum depression     Substance abuse     prior use, no current use    Syncope 2009    admitted x 1    Syncope      Past Surgical History:   Procedure Laterality Date    dental extraction ()      RHINOPLASTY      TONSILLECTOMY         PTA Medications   Medication Sig    lamoTRIgine (LAMICTAL) 200 MG tablet Take 1 tablet (200 mg total) by mouth every morning.    QUEtiapine (SEROQUEL) 200 MG Tab Take 1 tablet (200 mg total) by mouth every evening.    valACYclovir (VALTREX) 500 MG tablet Take 1 tablet (500 mg total) by mouth 2 (two) times daily. (Patient not taking: Reported on 3/27/2025)       Review of patient's allergies indicates:   Allergen Reactions    Bactrim [sulfamethoxazole-trimethoprim]     Sulfa (sulfonamide antibiotics)         Family History       Problem Relation (Age of Onset)    Arthritis Paternal Grandmother    Asthma Paternal Grandmother    Bipolar disorder Mother    Breast cancer Mother, Paternal Grandmother (55)    Cancer Mother, Paternal Grandmother, Paternal  Grandfather    Cervical cancer Mother    Depression Paternal Grandmother    Heart block Paternal Grandfather    Heart disease Paternal Grandfather    Hyperlipidemia Father, Paternal Grandmother, Paternal Grandfather    Hypertension Paternal Grandmother, Paternal Grandfather    Mental illness Mother    Skin cancer Mother    Stroke Paternal Grandmother    stroke Paternal Grandmother          Tobacco Use    Smoking status: Former     Types: Vaping with nicotine     Passive exposure: Past    Smokeless tobacco: Never   Substance and Sexual Activity    Alcohol use: Not Currently     Comment: 1-2 drinks lper week    Drug use: Not Currently     Types: Amphetamines, Marijuana     Comment: narcotics, ecstacy (all prior use)    Sexual activity: Not Currently     Partners: Male     Birth control/protection: None     Comment: birth control pill     Review of Systems   Gastrointestinal:  Positive for abdominal pain.   Genitourinary:  Positive for vaginal discharge. Negative for vaginal bleeding.   Neurological:  Negative for headaches.   All other systems reviewed and are negative.     Objective:     Vital Signs (Most Recent):  Pulse: 76 (04/26/25 0358)  BP: 130/74 (04/26/25 0308)  SpO2: 99 % (04/26/25 0358) Vital Signs (24h Range):  Pulse:  [57-95] 76  SpO2:  [89 %-100 %] 99 %  BP: (130)/(74) 130/74        There is no height or weight on file to calculate BMI.    FHT: 130bpm with moderate variability and accelerations, no decelerations, Cat 1 (reassuring)  TOCO:  Q 2-6 minutes     Physical Exam:   Constitutional: She is oriented to person, place, and time. She appears well-developed and well-nourished.    HENT:   Head: Normocephalic.      Cardiovascular:  Normal rate.             Pulmonary/Chest: Effort normal.        Abdominal: Soft. There is no abdominal tenderness.     Genitourinary: There is vaginal discharge in the vagina.           Musculoskeletal: Normal range of motion and moves all extremeties.       Neurological: She  is alert and oriented to person, place, and time.    Skin: Skin is warm and dry.    Psychiatric: She has a normal mood and affect. Her behavior is normal. Judgment and thought content normal.        Cervix:  Dilation:  3  Effacement:  60  Station: -3  Presentation: Vertex     Significant Labs:  Lab Results   Component Value Date    GROUPTRH A POS 04/26/2025    HEPBSAG Non-reactive 09/26/2024    STREPBCULT No Group B Streptococcus isolated 08/16/2023       I have personallly reviewed all pertinent lab results from the last 24 hours.

## 2025-04-26 NOTE — PROGRESS NOTES
S: Resting comfortable with BRIGHT in place.   O:  VS reviewed, afebrile   Vitals:    25 0735 25 0740 25 0745 25 0750   BP: 118/69 126/60 113/66 112/67   Pulse: 88 91 85 85   Resp:       Temp:       TempSrc:       SpO2: 100% 99% 99% 99%       FHTs 130 cat 1, reassuring  UC 2-6  SVE 4.5/70/-3  AROM of forebag. Copious amount of clear fluid noted.     A: IUP @ 40w5d ;     Problem List[1]    P:   Continue close monitoring of maternal/fetal status  Start pitocin per protocol. Anticipate progress and .        [1]   Patient Active Problem List  Diagnosis    Intractable chronic migraine without aura and without status migrainosus    Anxiety    Attention deficit hyperactivity disorder (ADHD), predominantly inattentive type    Bipolar disorder    CTS (carpal tunnel syndrome)    Insomnia    Migraine    ASCUS of cervix with negative high risk HPV    Post-term pregnancy, 40-42 weeks of gestation    Vagina, candidiasis    HSV1    BV (bacterial vaginosis)    Amniotic fluid leaking

## 2025-04-26 NOTE — HOSPITAL COURSE
Admit to L&D  Expectant management for now, breast pumping  IV LR  BRIGHT upon request, would like to avoid pitocin, discussed may be needed to get start active labor  Anticipate progress and vaginal delivery   4/26/25 @ 0630: ready to get things to rolling this morning, ready for BRIGHT and pitocin.   4/27/25 PPD #1 continue with routine PP care  4/28/25 PPD2: routine PP discharge instructions reviewed.   Please notify if experiencing increased vaginal bleeding. Saturating a pad in 20 min or passage of clots the size of a baseball.    Notify if experiencing dizziness, headache, blurred vision. Please take blood pressure if able. If blood pressure is greater than 140/90 repeat blood pressure again in 15 min.  If still greater than 140/90 notify provider.   Notify provider for signs of postpartum depression such as feelings of being overwhelmed and uninterested in caring for self or baby   Continue home meds. Patient states all meds have been cleared with all collaborating providers regarding administration and breastfeeding.

## 2025-04-27 PROBLEM — B96.89 BV (BACTERIAL VAGINOSIS): Status: RESOLVED | Noted: 2025-03-12 | Resolved: 2025-04-27

## 2025-04-27 PROBLEM — O48.0 POST-TERM PREGNANCY, 40-42 WEEKS OF GESTATION: Status: RESOLVED | Noted: 2023-06-07 | Resolved: 2025-04-27

## 2025-04-27 PROBLEM — N76.0 BV (BACTERIAL VAGINOSIS): Status: RESOLVED | Noted: 2025-03-12 | Resolved: 2025-04-27

## 2025-04-27 PROBLEM — B37.31 VAGINA, CANDIDIASIS: Status: RESOLVED | Noted: 2024-12-17 | Resolved: 2025-04-27

## 2025-04-27 PROCEDURE — 25000003 PHARM REV CODE 250: Performed by: ADVANCED PRACTICE MIDWIFE

## 2025-04-27 PROCEDURE — 11000001 HC ACUTE MED/SURG PRIVATE ROOM

## 2025-04-27 RX ORDER — HYDROCODONE BITARTRATE AND ACETAMINOPHEN 10; 325 MG/1; MG/1
1 TABLET ORAL EVERY 6 HOURS PRN
Refills: 0 | Status: DISCONTINUED | OUTPATIENT
Start: 2025-04-27 | End: 2025-04-28 | Stop reason: HOSPADM

## 2025-04-27 RX ADMIN — HYDROCODONE BITARTRATE AND ACETAMINOPHEN 1 TABLET: 10; 325 TABLET ORAL at 10:04

## 2025-04-27 RX ADMIN — QUETIAPINE FUMARATE 200 MG: 100 TABLET ORAL at 08:04

## 2025-04-27 RX ADMIN — HYDROCODONE BITARTRATE AND ACETAMINOPHEN 1 TABLET: 5; 325 TABLET ORAL at 02:04

## 2025-04-27 RX ADMIN — IBUPROFEN 600 MG: 600 TABLET, FILM COATED ORAL at 04:04

## 2025-04-27 RX ADMIN — DOCUSATE SODIUM 200 MG: 100 CAPSULE, LIQUID FILLED ORAL at 07:04

## 2025-04-27 RX ADMIN — IBUPROFEN 600 MG: 600 TABLET, FILM COATED ORAL at 01:04

## 2025-04-27 RX ADMIN — IBUPROFEN 600 MG: 600 TABLET, FILM COATED ORAL at 10:04

## 2025-04-27 RX ADMIN — HYDROCODONE BITARTRATE AND ACETAMINOPHEN 1 TABLET: 10; 325 TABLET ORAL at 04:04

## 2025-04-27 RX ADMIN — DOCUSATE SODIUM 200 MG: 100 CAPSULE, LIQUID FILLED ORAL at 08:04

## 2025-04-27 RX ADMIN — LAMOTRIGINE 200 MG: 100 TABLET ORAL at 06:04

## 2025-04-27 NOTE — LACTATION NOTE
Lactation Rounds:   Mother reports feedings are going well and denies any issues or concerns.  Mother states she just finished a feeding session on the right breast. She heard swallows and denies any pain. Infant is laying skin to skin on mother at this time.    Reviewed that infant should be fed on demand, at least 8 times a day. Reviewed proper positioning, signs of good attachment, and effective milk transfer. Hand expression encouraged to assist with feeding. Stressed the significance of frequent and uninterrupted skin-to-skin contact with the infant.     Mother denies any further lactation needs or concerns at this time. Lactation availability discussed. Encouraged mother to call for assistance when desired or when infant is showing signs of hunger. Mother verbalizes understanding of all education and counseling.

## 2025-04-27 NOTE — PLAN OF CARE
Patient afebrile and had no falls this shift. Fundus firm without massage and below umbilicus. Bleeding light, no clots passed this shift. Voids spontaneously. Ambulates independently. Vital signs stable at this time. Bonding well with infant; responds to infant cues and participates in infant care. Call light placed within reach; encouraged use if needed.

## 2025-04-27 NOTE — LACTATION NOTE
Mother called lactation to room. Mother states that the baby took 8 mls of expressed breast milk in a syringe and then latched to both breasts and fed well. Mother states that she heard swallows and denied having any pain during the feeding session. Mother states that she is comfortable with the current feeding plan. Mother denies any further lactation needs or concerns at this time. Discussed lactation availability. Encouraged mother to call for assistance when needs arise.

## 2025-04-27 NOTE — PLAN OF CARE
Patient afebrile and had no falls this shift. Fundus firm without massage and below umbilicus. Bleeding light, no clots passed this shift. Voids spontaneously. Ambulates independently. Pain well controlled with oral pain medication. Vital signs stable at this time. Bonding well with infant; responds to infant cues and participates in infant care.       Problem: Adult Inpatient Plan of Care  Goal: Plan of Care Review  Outcome: Progressing  Goal: Patient-Specific Goal (Individualized)  Outcome: Progressing  Goal: Absence of Hospital-Acquired Illness or Injury  Outcome: Progressing  Goal: Optimal Comfort and Wellbeing  Outcome: Progressing  Goal: Readiness for Transition of Care  Outcome: Progressing     Problem: Infection  Goal: Absence of Infection Signs and Symptoms  Outcome: Progressing     Problem: Breastfeeding  Goal: Effective Breastfeeding  Outcome: Progressing     Problem: Postpartum (Vaginal Delivery)  Goal: Successful Parent Role Transition  Outcome: Progressing  Goal: Hemostasis  Outcome: Progressing  Goal: Absence of Infection Signs and Symptoms  Outcome: Progressing  Goal: Anesthesia/Sedation Recovery  Outcome: Progressing  Goal: Optimal Pain Control and Function  Outcome: Progressing  Goal: Effective Urinary Elimination  Outcome: Progressing

## 2025-04-27 NOTE — LACTATION NOTE
Lactation Rounds:    Mother states that infant has been spitting up large amounts of mucus and does not want to feed this am. Mother states that the baby last ate at 3 am. Assisted mother with positioning infant skin to skin. Benefits of skin to skin and rooming in discussed. Infant has no feeding cues. Infant remains sleepy at the breast. Infants weight loss wnl. Infants intake and output wnl.     Mother was taught hand expression of breastmilk/colostrum. She was instructed to:  Sit upright and lean forward, if possible.  When feasible, apply warm, wet compress over breasts for a few minutes.   Perform gentle breast massage.  Form a C with her hand and place it about 1 inch back from the areola with the nipple centered between her index finger and her thumb.  Press, compress, relax:  Using her finger and thumb, apply pressure in an inward direction toward the breast without stretching the tissue, compress the breast tissue between her finger and thumb, then relax her finger and thumb. Repeat process for a few minutes.  Rotate placement of finger and thumb on the breasts to facilitate emptying.  Collect expressed breastmilk/colostrum with a spoon or cup and feed immediately to the baby, if able.  If unable to feed immediately, place breastmilk/colostrum directly into a sterile storage container for later use. Place the babys breast milk label (with the date and time of collection and the names of mother's medications) on the container. Reviewed proper handling and storage of expressed breastmilk.   Patient effectively return demonstrated and verbalized understanding.     Mother able to hand express 5.4 mls of expressed breast milk. This amount fed to infant via syringe. Infant tolerated well. Reinforced infant feeding & output pattern, cue based feeds & unrestricted access to the breast. Education provided to mother to attempt to breastfeed infant on demand, and 8 or more times in a 24 hour period. If infant  does not feed every 3 hours, mother encouraged to hand express and feed infant expressed breast milk.

## 2025-04-27 NOTE — PROGRESS NOTES
[facility] ([facility]  Obstetrics  Postpartum Progress Note    Patient Name: Iris Johnson  MRN: [id number]  Admission Date: 2025  Hospital Length of Stay: 1 days  Attending Physician: Sondra Mishra,*  Primary Care Provider: Gurinder Perez MD    Subjective:     Principal Problem: (normal spontaneous vaginal delivery)    Hospital Course:  Admit to L&D  Expectant management for now, breast pumping  IV LR  BRIGHT upon request, would like to avoid pitocin, discussed may be needed to get start active labor  Anticipate progress and vaginal delivery   25 @ 0630: ready to get things to rolling this morning, ready for BRIGHT and pitocin.   25 PPD #1 continue with routine PP care    Interval History:     She is doing well this morning. She is tolerating a regular diet without nausea or vomiting. She is voiding spontaneously. She is ambulating. She has passed flatus, and has not a BM. Vaginal bleeding is mild. She denies fever or chills. Abdominal pain is mild and controlled with oral medications. She Is breastfeeding. She desires circumcision for her male baby: not applicable.    Objective:     Vital Signs (Most Recent):  Temp: 98.3 °F (36.8 °C) (25 0813)  Pulse: 93 (25 0813)  Resp: 18 (25 1035)  BP: 119/73 (25 0813)  SpO2: 99 % (25 0813) Vital Signs (24h Range):  Temp:  [97.6 °F (36.4 °C)-98.3 °F (36.8 °C)] 98.3 °F (36.8 °C)  Pulse:  [] 93  Resp:  [16-18] 18  SpO2:  [96 %-100 %] 99 %  BP: ()/(49-88) 119/73        There is no height or weight on file to calculate BMI.      Intake/Output Summary (Last 24 hours) at 2025 1047  Last data filed at 2025 0000  Gross per 24 hour   Intake --   Output 3250 ml   Net -3250 ml         Significant Labs:  Lab Results   Component Value Date    GROUPTRH A POS 2025    HEPBSAG Non-reactive 2024    STREPBCULT No Group B Streptococcus isolated 2023     Recent Labs   Lab 25  0957    HGB 9.6*   HCT 30.4*       I have personallly reviewed all pertinent lab results from the last 24 hours.    Physical Exam:   Constitutional: She is oriented to person, place, and time. She appears well-developed and well-nourished.       Cardiovascular:  Normal rate.             Pulmonary/Chest: Effort normal.          Genitourinary:    Uterus normal.             Musculoskeletal: Normal range of motion and moves all extremeties.       Neurological: She is alert and oriented to person, place, and time.    Skin: Skin is warm and dry.    Psychiatric: She has a normal mood and affect. Her behavior is normal. Judgment and thought content normal.       Review of Systems  Assessment/Plan:     30 y.o. female  for:    Amniotic fluid leaking  Admit to L&D  Expectant management for now, breast pumping  IV LR  BRIGHT upon request, would like to avoid pitocin, discussed may be needed to get start active labor  Anticipate progress and vaginal delivery     Bipolar disorder  Seeing Tiffanie Galvez at [facility]  Lamictal and seroquel    Intractable chronic migraine without aura and without status migrainosus  Seeing neuro, botox injections        Disposition: As patient meets milestones, will plan to discharge 25.    Eloisa Novak CNM  Obstetrics  [facility] ([facility])

## 2025-04-27 NOTE — SUBJECTIVE & OBJECTIVE
Interval History:     She is doing well this morning. She is tolerating a regular diet without nausea or vomiting. She is voiding spontaneously. She is ambulating. She has passed flatus, and has not a BM. Vaginal bleeding is mild. She denies fever or chills. Abdominal pain is mild and controlled with oral medications. She Is breastfeeding. She desires circumcision for her male baby: not applicable.    Objective:     Vital Signs (Most Recent):  Temp: 98.3 °F (36.8 °C) (04/27/25 0813)  Pulse: 93 (04/27/25 0813)  Resp: 18 (04/27/25 1035)  BP: 119/73 (04/27/25 0813)  SpO2: 99 % (04/27/25 0813) Vital Signs (24h Range):  Temp:  [97.6 °F (36.4 °C)-98.3 °F (36.8 °C)] 98.3 °F (36.8 °C)  Pulse:  [] 93  Resp:  [16-18] 18  SpO2:  [96 %-100 %] 99 %  BP: ()/(49-88) 119/73        There is no height or weight on file to calculate BMI.      Intake/Output Summary (Last 24 hours) at 4/27/2025 1047  Last data filed at 4/27/2025 0000  Gross per 24 hour   Intake --   Output 3250 ml   Net -3250 ml         Significant Labs:  Lab Results   Component Value Date    GROUPTRH A POS 04/26/2025    HEPBSAG Non-reactive 09/26/2024    STREPBCULT No Group B Streptococcus isolated 08/16/2023     Recent Labs   Lab 04/26/25  0447   HGB 9.6*   HCT 30.4*       I have personallly reviewed all pertinent lab results from the last 24 hours.    Physical Exam:   Constitutional: She is oriented to person, place, and time. She appears well-developed and well-nourished.       Cardiovascular:  Normal rate.             Pulmonary/Chest: Effort normal.          Genitourinary:    Uterus normal.             Musculoskeletal: Normal range of motion and moves all extremeties.       Neurological: She is alert and oriented to person, place, and time.    Skin: Skin is warm and dry.    Psychiatric: She has a normal mood and affect. Her behavior is normal. Judgment and thought content normal.       Review of Systems

## 2025-04-28 ENCOUNTER — PATIENT MESSAGE (OUTPATIENT)
Dept: NEUROLOGY | Facility: CLINIC | Age: 30
End: 2025-04-28
Payer: COMMERCIAL

## 2025-04-28 VITALS
HEART RATE: 89 BPM | DIASTOLIC BLOOD PRESSURE: 59 MMHG | OXYGEN SATURATION: 100 % | TEMPERATURE: 98 F | SYSTOLIC BLOOD PRESSURE: 117 MMHG | RESPIRATION RATE: 16 BRPM

## 2025-04-28 PROBLEM — O42.90 AMNIOTIC FLUID LEAKING: Status: RESOLVED | Noted: 2025-04-26 | Resolved: 2025-04-28

## 2025-04-28 PROCEDURE — 25000003 PHARM REV CODE 250: Performed by: ADVANCED PRACTICE MIDWIFE

## 2025-04-28 RX ORDER — IBUPROFEN 800 MG/1
800 TABLET ORAL EVERY 8 HOURS PRN
Qty: 60 TABLET | Refills: 0 | Status: SHIPPED | OUTPATIENT
Start: 2025-04-28

## 2025-04-28 RX ORDER — ACETAMINOPHEN 325 MG/1
1000 TABLET ORAL EVERY 8 HOURS PRN
Qty: 60 TABLET | Refills: 0 | Status: SHIPPED | OUTPATIENT
Start: 2025-04-28

## 2025-04-28 RX ADMIN — ACETAMINOPHEN 650 MG: 325 TABLET ORAL at 05:04

## 2025-04-28 RX ADMIN — IBUPROFEN 600 MG: 600 TABLET, FILM COATED ORAL at 11:04

## 2025-04-28 RX ADMIN — HYDROCODONE BITARTRATE AND ACETAMINOPHEN 1 TABLET: 10; 325 TABLET ORAL at 08:04

## 2025-04-28 RX ADMIN — HYDROCODONE BITARTRATE AND ACETAMINOPHEN 1 TABLET: 10; 325 TABLET ORAL at 12:04

## 2025-04-28 RX ADMIN — IBUPROFEN 600 MG: 600 TABLET, FILM COATED ORAL at 05:04

## 2025-04-28 RX ADMIN — LAMOTRIGINE 200 MG: 100 TABLET ORAL at 06:04

## 2025-04-28 NOTE — PROGRESS NOTES
[facility] ([facility])  Obstetrics  Postpartum Progress Note    Patient Name: Iris Johnson  MRN: [id number]  Admission Date: 2025  Hospital Length of Stay: 2 days  Attending Physician: Sondra Mishra,*  Primary Care Provider: Gurinder Perez MD    Subjective:     Principal Problem: (normal spontaneous vaginal delivery)    Hospital Course:  Admit to L&D  Expectant management for now, breast pumping  IV LR  BIRGHT upon request, would like to avoid pitocin, discussed may be needed to get start active labor  Anticipate progress and vaginal delivery   25 @ 0630: ready to get things to rolling this morning, ready for BRIGHT and pitocin.   25 PPD #1 continue with routine PP care  25 PPD2: routine PP discharge instructions reviewed.   Please notify if experiencing increased vaginal bleeding. Saturating a pad in 20 min or passage of clots the size of a baseball.    Notify if experiencing dizziness, headache, blurred vision. Please take blood pressure if able. If blood pressure is greater than 140/90 repeat blood pressure again in 15 min.  If still greater than 140/90 notify provider.   Notify provider for signs of postpartum depression such as feelings of being overwhelmed and uninterested in caring for self or baby   Continue home meds. Patient states all meds have been cleared with all collaborating providers regarding administration and breastfeeding.     Interval History:     She is doing well this morning. She is tolerating a regular diet without nausea or vomiting. She is voiding spontaneously. She is ambulating. She has passed flatus, and has not a BM. Vaginal bleeding is mild. She denies fever or chills. Abdominal pain is mild and controlled with oral medications. She Is breastfeeding. She desires circumcision for her male baby: unsure.    Objective:     Vital Signs (Most Recent):  Temp: 98 °F (36.7 °C) (25 0752)  Pulse: 89 (25 0752)  Resp: 16 (25  "0824)  BP: (!) 117/59 (25 0752)  SpO2: 100 % (25 0752) Vital Signs (24h Range):  Temp:  [97.5 °F (36.4 °C)-98.5 °F (36.9 °C)] 98 °F (36.7 °C)  Pulse:  [74-89] 89  Resp:  [16-18] 16  SpO2:  [96 %-100 %] 100 %  BP: (105-139)/(58-82) 117/59        There is no height or weight on file to calculate BMI.    No intake or output data in the 24 hours ending 25 0937      Significant Labs:  Lab Results   Component Value Date    GROUPTRH A POS 2025    HEPBSAG Non-reactive 2024    STREPBCULT No Group B Streptococcus isolated 2023     No results for input(s): "HGB", "HCT" in the last 48 hours.    I have personallly reviewed all pertinent lab results from the last 24 hours.    Physical Exam:   Constitutional: She is oriented to person, place, and time. She appears well-developed and well-nourished.       Cardiovascular:  Normal rate.             Pulmonary/Chest: Effort normal. No respiratory distress.        Abdominal: Soft.     Genitourinary:    Uterus normal.             Musculoskeletal: Moves all extremeties.      Comments: Reports back pain in epidural location       Neurological: She is alert and oriented to person, place, and time.    Skin: Skin is warm and dry.    Psychiatric: She has a normal mood and affect.       Review of Systems  Assessment/Plan:     30 y.o. female  for:    *  (normal spontaneous vaginal delivery)  Routine PP orders    Single liveborn  Single live male under care of peds    Bipolar disorder  Seeing Tiffanie Galvez at [facility]  Lamictal and seroquel    Intractable chronic migraine without aura and without status migrainosus  Seeing neuro, botox injections        Disposition: As patient meets milestones, will plan to discharge today.    Bhumika Stephen CNM  Obstetrics  [facility] ([facility])      "

## 2025-04-28 NOTE — DISCHARGE SUMMARY
[facility] ([facility])  Obstetrics  Discharge Summary      Patient Name: Iris Johnson  MRN: [id number]  Admission Date: 2025  Hospital Length of Stay: 2 days  Discharge Date and Time: 2025 9:41 AM  Attending Physician: Sondra Mishra,*   Discharging Provider: Bhumika Stephen CNM   Primary Care Provider: Gurinder Perez MD    HPI: Presents to L&D reporting leaking of fluid since 12:40 that leaks down her leg        * No surgery found *     Hospital Course:   Admit to L&D  Expectant management for now, breast pumping  IV LR  BRIGHT upon request, would like to avoid pitocin, discussed may be needed to get start active labor  Anticipate progress and vaginal delivery   25 @ 0630: ready to get things to rolling this morning, ready for BRIGHT and pitocin.   25 PPD #1 continue with routine PP care  25 PPD2: routine PP discharge instructions reviewed.   Please notify if experiencing increased vaginal bleeding. Saturating a pad in 20 min or passage of clots the size of a baseball.    Notify if experiencing dizziness, headache, blurred vision. Please take blood pressure if able. If blood pressure is greater than 140/90 repeat blood pressure again in 15 min.  If still greater than 140/90 notify provider.   Notify provider for signs of postpartum depression such as feelings of being overwhelmed and uninterested in caring for self or baby   Continue home meds. Patient states all meds have been cleared with all collaborating providers regarding administration and breastfeeding.          Final Active Diagnoses:    Diagnosis Date Noted POA    PRINCIPAL PROBLEM:   (normal spontaneous vaginal delivery) [O80] 2025 Not Applicable    Single liveborn [Z38.2] 2025 Unknown    Bipolar disorder [F31.9] 2022 Yes    Intractable chronic migraine without aura and without status migrainosus [G43.719] 2021 Yes      Problems Resolved During this Admission:    Diagnosis Date Noted  "Date Resolved POA    Amniotic fluid leaking [O42.90] 2025 Yes        Significant Diagnostic Studies: Labs: All labs within the past 24 hours have been reviewed      Feeding Method: breast    Immunizations       Date Immunization Status Dose Route/Site Given by    25 1251 MMR Incomplete 0.5 mL Subcutaneous/     25 1251 Tdap Incomplete 0.5 mL Intramuscular/             Delivery:    Episiotomy: None   Lacerations: None   Repair suture: None   Repair # of packets:     Blood loss (ml):       Birth information:  YOB: 2027   Time of birth: 11:31 AM   Sex: male   Delivery type: Vaginal, Spontaneous   Gestational Age: 40w5d     Measurements    Weight: 3580 g  Weight (lbs): 7 lb 14.3 oz  Length: 49.5 cm  Length (in): 19.5"  Head circumference: 35 cm  Chest circumference: 34.5 cm  Abdominal girth: 33 cm         Delivery Clinician: Delivery Providers    Delivering clinician: Paula Rondon CNM   Provider Role    Khadijah Webb RN Registered Nurse    Reina Frazier RN Registered Nurse    Manuela Morocho, ST Surgical Tech             Additional  information:  Forceps:    Vacuum:    Breech:    Observed anomalies      Living?:     Apgars    Living status: Living  Apgar Component Scores:  1 min.:  5 min.:  10 min.:  15 min.:  20 min.:    Skin color:  0  1       Heart rate:  2  2       Reflex irritability:  2  2       Muscle tone:  2  2       Respiratory effort:  2  2       Total:  8  9       Apgars assigned by: CELSA FRAZIER RN         Placenta: Delivered:       appearance  Pending Diagnostic Studies:       None            Discharged Condition: good    Disposition: Home or Self Care    Follow Up:    Patient Instructions:      Pelvic Rest     Notify your health care provider if you experience any of the following:  temperature >100.4     Notify your health care provider if you experience any of the following:  persistent nausea and vomiting or diarrhea     Notify your health care provider " if you experience any of the following:  severe uncontrolled pain     Notify your health care provider if you experience any of the following:  difficulty breathing or increased cough     Notify your health care provider if you experience any of the following:  severe persistent headache     Notify your health care provider if you experience any of the following:  persistent dizziness, light-headedness, or visual disturbances     Notify your health care provider if you experience any of the following:  increased confusion or weakness     Notify your health care provider if you experience any of the following:   Order Comments: Please notify if experiencing increased vaginal bleeding. Saturating a pad in 20 min or passage of clots the size of a baseball.    Notify if experiencing dizziness, headache, blurred vision. Please take blood pressure if able. If blood pressure is greater than 140/90 repeat blood pressure again in 15 min.  If still greater than 140/90 notify provider.   Notify provider for signs of postpartum depression such as feelings of being overwhelmed and uninterested in caring for self or baby     Activity as tolerated     Medications:  Current Discharge Medication List        START taking these medications    Details   acetaminophen (TYLENOL) 325 MG tablet Take 3 tablets (975 mg total) by mouth every 8 (eight) hours as needed for Pain.  Qty: 60 tablet, Refills: 0    Comments: Please deliver to bedside room 428      ibuprofen (ADVIL,MOTRIN) 800 MG tablet Take 1 tablet (800 mg total) by mouth every 8 (eight) hours as needed for Pain.  Qty: 60 tablet, Refills: 0    Comments: Please deliver to bedside room 428           CONTINUE these medications which have NOT CHANGED    Details   lamoTRIgine (LAMICTAL) 200 MG tablet Take 1 tablet (200 mg total) by mouth every morning.  Qty: 30 tablet, Refills: 2    Associated Diagnoses: Bipolar disease during pregnancy in third trimester      QUEtiapine (SEROQUEL) 200  MG Tab Take 1 tablet (200 mg total) by mouth every evening.  Qty: 30 tablet, Refills: 2    Associated Diagnoses: Bipolar disease during pregnancy in third trimester      valACYclovir (VALTREX) 500 MG tablet Take 1 tablet (500 mg total) by mouth 2 (two) times daily.  Qty: 60 tablet, Refills: 0             Bhumika Stephen CNM  Obstetrics  [facility] ([facility])

## 2025-04-28 NOTE — LACTATION NOTE
Lactation Rounds:    Upon entering the room, Mother has infant latched in cross cradle hold on the right breast. Infant has a nutritive suck and audible swallows noted. Mother denies any pain. Mother reports that breastfeeding is going well.    Mother anticipates discharge home today. Reviewed signs of good attachment. Reviewed breast massage and compression during feedings and indications for use. Reviewed signs of effective milk transfer and instructed to call pediatrician and lactation if signs not present. Discussed expected feeding and output pattern for days of life 2, 3, 4, & 5+; mother instructed to call pediatrician and lactation if infant is not meeting feeding and output goals.     Reviewed signs of engorgement and expectant management. Reviewed signs of mastitis and instructed mother to call OB provider and lactation if any signs present. Discussed proper use of First Alert Form. Reviewed proper milk handling, collection and storage guidelines. Reviewed nursing diet and nutrition. Discussed resources for medication safety while breastfeeding. Reviewed available outpatient lactation resources.     Mother verbalizes understanding of all education and counseling; she denies any further lactation needs or concerns at this time. Encouraged mother to contact lactation with any questions, concerns, or problems, contact number provided.

## 2025-04-28 NOTE — SUBJECTIVE & OBJECTIVE
"Interval History:     She is doing well this morning. She is tolerating a regular diet without nausea or vomiting. She is voiding spontaneously. She is ambulating. She has passed flatus, and has not a BM. Vaginal bleeding is mild. She denies fever or chills. Abdominal pain is mild and controlled with oral medications. She Is breastfeeding. She desires circumcision for her male baby: unsure.    Objective:     Vital Signs (Most Recent):  Temp: 98 °F (36.7 °C) (04/28/25 0752)  Pulse: 89 (04/28/25 0752)  Resp: 16 (04/28/25 0824)  BP: (!) 117/59 (04/28/25 0752)  SpO2: 100 % (04/28/25 0752) Vital Signs (24h Range):  Temp:  [97.5 °F (36.4 °C)-98.5 °F (36.9 °C)] 98 °F (36.7 °C)  Pulse:  [74-89] 89  Resp:  [16-18] 16  SpO2:  [96 %-100 %] 100 %  BP: (105-139)/(58-82) 117/59        There is no height or weight on file to calculate BMI.    No intake or output data in the 24 hours ending 04/28/25 0937      Significant Labs:  Lab Results   Component Value Date    GROUPTRH A POS 04/26/2025    HEPBSAG Non-reactive 09/26/2024    STREPBCULT No Group B Streptococcus isolated 08/16/2023     No results for input(s): "HGB", "HCT" in the last 48 hours.    I have personallly reviewed all pertinent lab results from the last 24 hours.    Physical Exam:   Constitutional: She is oriented to person, place, and time. She appears well-developed and well-nourished.       Cardiovascular:  Normal rate.             Pulmonary/Chest: Effort normal. No respiratory distress.        Abdominal: Soft.     Genitourinary:    Uterus normal.             Musculoskeletal: Moves all extremeties.      Comments: Reports back pain in epidural location       Neurological: She is alert and oriented to person, place, and time.    Skin: Skin is warm and dry.    Psychiatric: She has a normal mood and affect.       Review of Systems  "

## 2025-04-28 NOTE — DISCHARGE INSTRUCTIONS
"Mother Self Care:    Activity: Avoid strenuous exercise and get adequate rest.  No driving until the physician consent given.  Emotional Changes: Most women find birth to be a time of great emotional upheaval.  Sense of loss, mood swings, fatigue, anxiety, and feeling "let down" are common.  If feelings worsen or last more than a week, call your physician.  Breast Care/Breastfeeding: Wear a bra for comfort.  Keep nipples dry and apply your own breast milk or lanolin cream as needed for soreness.  Engorgement can be relieved with warm, moist heat before feedings.  You may also take Ibuprofen.    Francis-Care/Vaginal Bleeding: Remember to use your francis-bottle after urinating.  Your flow will change from red, to pink, to yellow/white color over a period of 2 weeks.  Menstruation will return in 3-8 weeks, or longer if breastfeeding.   Vaginal Delivery:   Warm baths, tucks, and dermoplast will promote healing.  Avoid bubble baths or strong soaps.    Sexual Activity/Pelvic Rest: No sexual activity, tampons, or douching until your physician gives you consent.  Diet: Continue to eat from the five basic food groups, including plenty of protein, fruits, vegetables, and whole grains.  Limit empty calories and high fat foods.  Drink enough fluids to satisfy thirst and add an extra 500 calories for breastfeeding.  Constipation/Hemorrhoids: Drink plenty of water.  You may take a stool softener or natural laxative (Metamucil). You may use tucks or hemorrhoid ointment and soak in a warm tub.    "What does help look like to you when you go home?"  "Is there any need that you anticipate that we may be able to assist with?"    CALL YOUR OB DOCTOR IF ANY OF THE FOLLOWING OCCURS:  *Heavy bleeding - saturating a pad an hour or passing any large (2-3 inches in size) blood clots.  *Any pain, redness, or tenderness in lower leg.  *You cannot care for yourself or your baby.  *Any signs of infection-      - Temperature greater than 100.5 degrees " F      - Foul smelling vaginal discharge       - Increased  pain      - Hot, hard, red or sore area on breast      - Flu-like symptoms      - Any urgency, frequency or burning with urination    Return To the Hospital for further Evaluation:  Headache not relieved by tylenol or ibuprofen  Blurry vision, double vision, seeing spots, or flashing lights  Feeling faint or passing out  Right epigastric pain  Difficulty breathing  Swelling in hands, face, or feet  Any of these symptoms accompanied by nausea/vomiting  Gaining more than 5 pounds in one week  Seizures  These symptoms could be an indication of elevated blood pressure.     For patients that were treated for high blood pressure during pregnancy and or your hospital stay you will need a blood pressure check three days after you leave the hospital. Your nursing staff will assist you in an appointment if needed. If you have Connected Mom you may use your blood pressure cuff and report any readings 140/90 to your provider immediately.       If you have any questions that need to be answered immediately please call the Labor & Delivery Unit at [phone] and ask to speak to a nurse.      Please see [facility] folder for additional information and handouts.

## 2025-04-28 NOTE — PLAN OF CARE
[facility]  Discharge Assessment    Primary Care Provider: Gurinder Perez MD     OB Screen (most recent)       OB Screen - [id number]          OB SCREEN    Assessment Type Discharge Planning Assessment     Source of Information patient;health record     Received Prenatal Care Yes     Any indications/suspicions for None     Is this a teen pregnancy No     Is the baby in NICU No     Indication for adoption/Safe Haven No     Indication for DME/post-acute needs No     HIV (+) No     Any congenital  disorders No     Fetal demise/ death No

## 2025-04-28 NOTE — PLAN OF CARE
Plan of care discussed with patient. Patient had no falls this shift. Fundus firm without massage and below umbilicus. Bleeding light, no clots passed this shift. Voids spontaneously. Ambulates independently. Pain well controlled with oral pain medication. Vital signs stable at this time. Bonding well with infant; responds to infant cues and participates in infant care.Call light in reach of patient. Educated to use if needed.

## 2025-04-29 DIAGNOSIS — G43.719 INTRACTABLE CHRONIC MIGRAINE WITHOUT AURA AND WITHOUT STATUS MIGRAINOSUS: Primary | ICD-10-CM

## 2025-05-02 ENCOUNTER — PATIENT MESSAGE (OUTPATIENT)
Dept: NEUROLOGY | Facility: CLINIC | Age: 30
End: 2025-05-02
Payer: COMMERCIAL

## 2025-05-02 DIAGNOSIS — G43.719 INTRACTABLE CHRONIC MIGRAINE WITHOUT AURA AND WITHOUT STATUS MIGRAINOSUS: Primary | ICD-10-CM

## 2025-05-05 RX ORDER — SUMATRIPTAN SUCCINATE 4 MG/.5ML
INJECTION, SOLUTION SUBCUTANEOUS
Qty: 3 ML | Refills: 3 | Status: SHIPPED | OUTPATIENT
Start: 2025-05-05

## 2025-05-19 ENCOUNTER — TELEPHONE (OUTPATIENT)
Dept: NEUROLOGY | Facility: CLINIC | Age: 30
End: 2025-05-19
Payer: COMMERCIAL

## 2025-05-19 NOTE — TELEPHONE ENCOUNTER
Spoke with Kaylyn at [facility], per Deb Aguirre NP approved order change to Imitrex 6 mg. Once at migraine onset. Max dose 12 mg in 24 hours.

## 2025-05-19 NOTE — TELEPHONE ENCOUNTER
----- Message from Deb Aguirre NP sent at 5/19/2025 10:41 AM CDT -----  Contact: [facility]  Can dispense 6 mg. Once at migraine onset. Max dose 12 mg in 24 hours  ----- Message -----  From: Dafne Clemons LPN  Sent: 5/19/2025   8:47 AM CDT  To: Deb NORTON. KACIE Aguirre      ----- Message -----  From: Kalie Moses  Sent: 5/19/2025   8:32 AM CDT  To: Timothy NORTON Staff    Type:  Pharmacy Calling to Clarify an RXName of Caller:[facility] Name:[facility] Prescription Name:SUMAtriptan succinate (IMITREX) 4 mg/0.5 mL PnIjWhat do they need to clarify?:4MG IS ON BACK ORDER/ CAN RX BE CHANGED TO 6MG?Best Call Back Number: [facility] [place] [facility] [address] [facility] [id number] [address] [address]Ohtmn: [phone] Fax: [phone] Information: THANK YOU

## 2025-05-29 ENCOUNTER — POSTPARTUM VISIT (OUTPATIENT)
Dept: OBSTETRICS AND GYNECOLOGY | Facility: CLINIC | Age: 30
End: 2025-05-29
Payer: COMMERCIAL

## 2025-05-29 VITALS
BODY MASS INDEX: 24.8 KG/M2 | HEIGHT: 61 IN | SYSTOLIC BLOOD PRESSURE: 118 MMHG | WEIGHT: 131.38 LBS | DIASTOLIC BLOOD PRESSURE: 70 MMHG

## 2025-05-29 PROCEDURE — 0503F POSTPARTUM CARE VISIT: CPT | Mod: S$GLB,,, | Performed by: ADVANCED PRACTICE MIDWIFE

## 2025-05-29 PROCEDURE — 99999 PR PBB SHADOW E&M-EST. PATIENT-LVL III: CPT | Mod: PBBFAC,,, | Performed by: ADVANCED PRACTICE MIDWIFE

## 2025-05-29 NOTE — PROGRESS NOTES
"Subjective:       Iris Johnson is a 30 y.o. female who presents for a postpartum visit. She is 4 weeks postpartum following a spontaneous vaginal delivery. I have fully reviewed the prenatal and intrapartum course. The delivery was at 40.5 gestational weeks. Outcome: spontaneous vaginal delivery. Anesthesia: epidural. Postpartum course has been uncomplicated. Baby's course has been uncomplicated. Baby is feeding by breast. Bleeding no bleeding. Bowel function is normal. Bladder function is normal. Patient is not sexually active. Contraception method is vasectomy. Postpartum depression screening: negative. Has appointment with psych tomorrow. Having pain and discoloration over epidural site.     The following portions of the patient's history were reviewed and updated as appropriate: allergies, current medications, past family history, past medical history, past social history, past surgical history, and problem list.    Review of Systems  Pertinent items are noted in HPI.     Objective:      /70 (Patient Position: Sitting)   Ht 5' 1" (1.549 m)   Wt 59.6 kg (131 lb 6.3 oz)   LMP 07/15/2024 (Approximate)   Breastfeeding Yes   BMI 24.83 kg/m²    General:  alert, appears stated age, and cooperative               Abdomen: normal findings: soft, non-tender            Small scar on epidural site, no edema or redness                      Assessment:      Routine postpartum exam. Pap smear not done at today's visit.     Plan:      1. Contraception: vasectomy, does not want BC in meantime  2. Last pap smear 9/26/24 WNL, next due 9/2627  3. Follow up in: 1 year for annual exam or as needed.     "

## 2025-06-05 ENCOUNTER — OFFICE VISIT (OUTPATIENT)
Dept: PSYCHIATRY | Facility: CLINIC | Age: 30
End: 2025-06-05
Payer: COMMERCIAL

## 2025-06-05 VITALS
SYSTOLIC BLOOD PRESSURE: 119 MMHG | WEIGHT: 131.19 LBS | DIASTOLIC BLOOD PRESSURE: 79 MMHG | BODY MASS INDEX: 24.79 KG/M2 | HEART RATE: 72 BPM

## 2025-06-05 DIAGNOSIS — F41.9 ANXIETY: ICD-10-CM

## 2025-06-05 DIAGNOSIS — F31.9 BIPOLAR I DISORDER: Primary | ICD-10-CM

## 2025-06-05 DIAGNOSIS — F90.2 ATTENTION DEFICIT HYPERACTIVITY DISORDER (ADHD), COMBINED TYPE: ICD-10-CM

## 2025-06-05 PROCEDURE — 99999 PR PBB SHADOW E&M-EST. PATIENT-LVL II: CPT | Mod: PBBFAC,,, | Performed by: PSYCHOLOGIST

## 2025-06-05 RX ORDER — DEXTROAMPHETAMINE SACCHARATE, AMPHETAMINE ASPARTATE, DEXTROAMPHETAMINE SULFATE AND AMPHETAMINE SULFATE 2.5; 2.5; 2.5; 2.5 MG/1; MG/1; MG/1; MG/1
10 TABLET ORAL 2 TIMES DAILY
Qty: 60 TABLET | Refills: 0 | Status: SHIPPED | OUTPATIENT
Start: 2025-07-05 | End: 2025-08-04

## 2025-06-05 RX ORDER — DEXTROAMPHETAMINE SACCHARATE, AMPHETAMINE ASPARTATE, DEXTROAMPHETAMINE SULFATE AND AMPHETAMINE SULFATE 2.5; 2.5; 2.5; 2.5 MG/1; MG/1; MG/1; MG/1
10 TABLET ORAL 2 TIMES DAILY
Qty: 60 TABLET | Refills: 0 | Status: SHIPPED | OUTPATIENT
Start: 2025-08-04 | End: 2025-09-03

## 2025-06-05 RX ORDER — DEXTROAMPHETAMINE SACCHARATE, AMPHETAMINE ASPARTATE, DEXTROAMPHETAMINE SULFATE AND AMPHETAMINE SULFATE 2.5; 2.5; 2.5; 2.5 MG/1; MG/1; MG/1; MG/1
10 TABLET ORAL 2 TIMES DAILY
Qty: 60 TABLET | Refills: 0 | Status: SHIPPED | OUTPATIENT
Start: 2025-06-05 | End: 2025-07-05

## 2025-06-05 RX ORDER — LAMOTRIGINE 200 MG/1
200 TABLET ORAL EVERY MORNING
Qty: 30 TABLET | Refills: 3 | Status: SHIPPED | OUTPATIENT
Start: 2025-06-05 | End: 2025-10-03

## 2025-06-05 RX ORDER — QUETIAPINE FUMARATE 200 MG/1
200 TABLET, FILM COATED ORAL NIGHTLY
Qty: 30 TABLET | Refills: 3 | Status: SHIPPED | OUTPATIENT
Start: 2025-06-05 | End: 2025-10-03

## 2025-06-16 ENCOUNTER — PATIENT MESSAGE (OUTPATIENT)
Dept: PSYCHIATRY | Facility: CLINIC | Age: 30
End: 2025-06-16
Payer: COMMERCIAL

## 2025-06-16 DIAGNOSIS — F90.2 ATTENTION DEFICIT HYPERACTIVITY DISORDER (ADHD), COMBINED TYPE: ICD-10-CM

## 2025-06-16 RX ORDER — DEXTROAMPHETAMINE SACCHARATE, AMPHETAMINE ASPARTATE, DEXTROAMPHETAMINE SULFATE AND AMPHETAMINE SULFATE 2.5; 2.5; 2.5; 2.5 MG/1; MG/1; MG/1; MG/1
10 TABLET ORAL 2 TIMES DAILY
Qty: 60 TABLET | Refills: 0 | Status: SHIPPED | OUTPATIENT
Start: 2025-06-16 | End: 2025-07-18

## 2025-06-23 ENCOUNTER — PROCEDURE VISIT (OUTPATIENT)
Dept: NEUROLOGY | Facility: CLINIC | Age: 30
End: 2025-06-23
Payer: COMMERCIAL

## 2025-06-23 VITALS
HEIGHT: 61 IN | HEART RATE: 107 BPM | DIASTOLIC BLOOD PRESSURE: 77 MMHG | SYSTOLIC BLOOD PRESSURE: 115 MMHG | BODY MASS INDEX: 24.77 KG/M2 | RESPIRATION RATE: 18 BRPM | WEIGHT: 131.19 LBS

## 2025-06-23 DIAGNOSIS — G43.719 INTRACTABLE CHRONIC MIGRAINE WITHOUT AURA AND WITHOUT STATUS MIGRAINOSUS: Primary | ICD-10-CM

## 2025-06-23 RX ORDER — CEFUROXIME AXETIL 250 MG/1
TABLET ORAL
COMMUNITY
Start: 2025-05-19

## 2025-06-23 NOTE — PROCEDURES
Procedures  A time out was conducted just before the start of the procedure to verify the correct patient and procedure, procedure location, and all relevant critical information.      Conventional methods of treatment such as multiple medications, both on and   off label have been tried including: lamotrigine, seroquel, Lyrica, Topamax, desipramine, fluoxetine, Wellbutrin, Celexa, Lexapro, gabapentin     The patient has been unresponsive and refractory.The patient meets criteria for chronic headaches according to the ICHD-II, the patient has more than 15 headaches a month which last for more than 4 hours a day.     Botox session number: 3  Last session was 12 weeks ago and resulted in improvement of: n/a     I am aiming for at least 50%  improvement in the patient's symptoms. Frequency of treatment is every 3 months unless no response to the treatments, at which time we will discontinue the injections.      DESCRIPTION OF PROCEDURE: After obtaining informed consent and under   aseptic technique, a total of 155 units of botulinum toxin type A were   injected in the following muscles:      -- Procerus 5 units  --  5 units bilaterally  -- Frontalis 20 units  -- Temporalis 20 units bilaterally  -- Occipitalis 15 units bilaterally  -- Upper cervical paraspinals 10 units bilaterally  -- Trapezius 15 units bilaterally.      The patient tolerated the procedure well. There were no complications. The patient was given a prescription for repeat treatment in 12 weeks      Unavoidable waste 45 units    SERA Goodman

## 2025-08-11 ENCOUNTER — PATIENT MESSAGE (OUTPATIENT)
Dept: NEUROLOGY | Facility: CLINIC | Age: 30
End: 2025-08-11
Payer: COMMERCIAL

## 2025-08-11 DIAGNOSIS — M54.2 NECK PAIN: Primary | ICD-10-CM

## 2025-08-14 DIAGNOSIS — F90.2 ATTENTION DEFICIT HYPERACTIVITY DISORDER (ADHD), COMBINED TYPE: ICD-10-CM

## 2025-08-14 RX ORDER — DEXTROAMPHETAMINE SACCHARATE, AMPHETAMINE ASPARTATE, DEXTROAMPHETAMINE SULFATE AND AMPHETAMINE SULFATE 2.5; 2.5; 2.5; 2.5 MG/1; MG/1; MG/1; MG/1
10 TABLET ORAL 2 TIMES DAILY
Qty: 60 TABLET | Refills: 0 | Status: SHIPPED | OUTPATIENT
Start: 2025-08-14 | End: 2025-09-14